# Patient Record
Sex: MALE | Race: BLACK OR AFRICAN AMERICAN | Employment: OTHER | URBAN - METROPOLITAN AREA
[De-identification: names, ages, dates, MRNs, and addresses within clinical notes are randomized per-mention and may not be internally consistent; named-entity substitution may affect disease eponyms.]

---

## 2018-01-23 ENCOUNTER — OFFICE VISIT (OUTPATIENT)
Dept: ORTHOPEDICS CLINIC | Facility: CLINIC | Age: 58
End: 2018-01-23

## 2018-01-23 ENCOUNTER — HOSPITAL ENCOUNTER (OUTPATIENT)
Dept: GENERAL RADIOLOGY | Facility: HOSPITAL | Age: 58
Discharge: HOME | End: 2018-01-23
Attending: ORTHOPAEDIC SURGERY
Payer: MEDICAID

## 2018-01-23 DIAGNOSIS — M22.41 CHONDROMALACIA PATELLAE OF RIGHT KNEE: ICD-10-CM

## 2018-01-23 DIAGNOSIS — S39.012A LUMBAR STRAIN, INITIAL ENCOUNTER: Primary | ICD-10-CM

## 2018-01-23 DIAGNOSIS — M79.604 RIGHT LEG PAIN: ICD-10-CM

## 2018-01-23 DIAGNOSIS — M25.561 RIGHT KNEE PAIN, UNSPECIFIED CHRONICITY: ICD-10-CM

## 2018-01-23 PROCEDURE — 72100 X-RAY EXAM L-S SPINE 2/3 VWS: CPT | Performed by: ORTHOPAEDIC SURGERY

## 2018-01-23 PROCEDURE — 99212 OFFICE O/P EST SF 10 MIN: CPT | Performed by: ORTHOPAEDIC SURGERY

## 2018-01-23 PROCEDURE — 73560 X-RAY EXAM OF KNEE 1 OR 2: CPT | Performed by: ORTHOPAEDIC SURGERY

## 2018-01-23 PROCEDURE — 99203 OFFICE O/P NEW LOW 30 MIN: CPT | Performed by: ORTHOPAEDIC SURGERY

## 2018-01-23 PROCEDURE — 73565 X-RAY EXAM OF KNEES: CPT | Performed by: ORTHOPAEDIC SURGERY

## 2018-01-23 RX ORDER — TEMAZEPAM 30 MG/1
30 CAPSULE ORAL DAILY
Refills: 0 | COMMUNITY
Start: 2017-12-31 | End: 2019-11-26

## 2018-01-23 RX ORDER — ZIDOVUDINE 300 MG/1
300 TABLET ORAL 2 TIMES DAILY
Refills: 6 | COMMUNITY
Start: 2017-12-31

## 2018-01-23 RX ORDER — CLONIDINE HYDROCHLORIDE 0.1 MG/1
0.1 TABLET ORAL DAILY
Refills: 2 | COMMUNITY
Start: 2017-12-31

## 2018-01-23 RX ORDER — LAMIVUDINE 150 MG/1
150 TABLET, FILM COATED ORAL 2 TIMES DAILY
COMMUNITY

## 2018-01-23 RX ORDER — TRAZODONE HYDROCHLORIDE 50 MG/1
50 TABLET ORAL NIGHTLY
Refills: 0 | COMMUNITY
Start: 2017-12-31 | End: 2019-11-07

## 2018-01-23 RX ORDER — ALBUTEROL SULFATE 90 UG/1
AEROSOL, METERED RESPIRATORY (INHALATION) EVERY 6 HOURS PRN
COMMUNITY
End: 2018-08-03

## 2018-01-23 RX ORDER — DARUNAVIR 600 MG
600 TABLET ORAL 2 TIMES DAILY
Refills: 0 | COMMUNITY
Start: 2017-12-31

## 2018-01-23 RX ORDER — ALLOPURINOL 100 MG/1
100 TABLET ORAL DAILY
Refills: 3 | COMMUNITY
Start: 2017-12-31

## 2018-01-23 RX ORDER — SERTRALINE HYDROCHLORIDE 100 MG/1
100 TABLET, FILM COATED ORAL
Refills: 0 | COMMUNITY
Start: 2017-12-31 | End: 2019-12-17

## 2018-01-23 RX ORDER — ERGOCALCIFEROL 1.25 MG/1
CAPSULE ORAL
COMMUNITY
End: 2019-04-04

## 2018-01-23 RX ORDER — RANITIDINE 150 MG/1
150 TABLET ORAL 2 TIMES DAILY
COMMUNITY
End: 2020-07-20

## 2018-01-23 RX ORDER — METOPROLOL TARTRATE 50 MG/1
50 TABLET, FILM COATED ORAL 2 TIMES DAILY
Refills: 2 | COMMUNITY
Start: 2017-12-31 | End: 2018-12-07 | Stop reason: ALTCHOICE

## 2018-01-24 NOTE — PROGRESS NOTES
1/23/2018  Angela Nae  12/10/1960  65year old   male  Mike Del Rosario MD    HPI:   Patient presents with:  Knee Pain: right- pt states pain started 3 months ago after he had a fall. pt went to [place] ER and had XR, but did not bring it with him.  p Take 150 mg by mouth 2 (two) times daily. Disp:  Rfl:    ergocalciferol 93723 units Oral Cap Take by mouth every 7 days. Disp:  Rfl:    RaNITidine HCl 150 MG Oral Tab Take 150 mg by mouth 2 (two) times daily.  Disp:  Rfl:       HISTORY:  Past Medical Histor calf negative Homans sign. The patient has no tenderness palpation about the lumbar spine. Patient has no pain with extension or flexion of the lumbar spine. The patient has a negative straight leg raise bilaterally.   Patient has no pain with extensio

## 2018-02-01 ENCOUNTER — OFFICE VISIT (OUTPATIENT)
Dept: PHYSICAL THERAPY | Facility: HOSPITAL | Age: 58
End: 2018-02-01
Attending: ORTHOPAEDIC SURGERY
Payer: MEDICAID

## 2018-02-01 DIAGNOSIS — S39.012A LUMBAR STRAIN, INITIAL ENCOUNTER: ICD-10-CM

## 2018-02-01 DIAGNOSIS — M22.41 CHONDROMALACIA PATELLAE OF RIGHT KNEE: ICD-10-CM

## 2018-02-01 PROCEDURE — 97162 PT EVAL MOD COMPLEX 30 MIN: CPT | Performed by: PHYSICAL THERAPIST

## 2018-02-01 NOTE — PROGRESS NOTES
LUMBAR SPINE EVALUATION:   Referring Physician: Dr. Fanny Griffin  Diagnosis: Lumbar strain, initial encounter (Q14.216R)  Chondromalacia patellae of right knee (M22.41)    Evaluation Date: 2/1/2018  Visit # 1  Scheduled Visits 8  Insurance Authorized visits rounded shoulders and forward head  Response to OC posture: NE  Response to slouch posture: inc pulling in leg      Strength   Right Left Comments   Hip Flexion (L2)      2+/5 5/5    Knee Extension (L3) 3+/5 5/5    Knee Flexion 3+/5 5/5    Ankle DF (L4)  5 Duration: Patient will be seen for 2x/week or a total of 10 visits over a 90 day period. Treatment will include: Manual Therapy; Therapeutic Exercises; Neuromuscular Re-education; Therapeutic Activity; Ultrasound;  Electrical Stim; Traction; Patient educat

## 2018-02-06 ENCOUNTER — APPOINTMENT (OUTPATIENT)
Dept: PHYSICAL THERAPY | Facility: HOSPITAL | Age: 58
End: 2018-02-06
Attending: ORTHOPAEDIC SURGERY
Payer: MEDICAID

## 2018-02-08 ENCOUNTER — OFFICE VISIT (OUTPATIENT)
Dept: PHYSICAL THERAPY | Facility: HOSPITAL | Age: 58
End: 2018-02-08
Attending: ORTHOPAEDIC SURGERY
Payer: MEDICAID

## 2018-02-08 DIAGNOSIS — S39.012A LUMBAR STRAIN, INITIAL ENCOUNTER: ICD-10-CM

## 2018-02-08 DIAGNOSIS — M22.41 CHONDROMALACIA PATELLAE OF RIGHT KNEE: ICD-10-CM

## 2018-02-08 PROCEDURE — 97110 THERAPEUTIC EXERCISES: CPT

## 2018-02-08 NOTE — PROGRESS NOTES
Diagnosis: Lumbar strain, initial encounter (Z52.886G)  Chondromalacia patellae of right knee (M22.41)     Authorized # of Visits:  2/6         Next MD visit: none scheduled  Fall Risk: standard         Precautions: HIV, COPD           Medication Changes s strengthening, ROM, postural training, HEP training. Pt will purchase st cane. Charges:  TherEx3       Total Timed Treatment: 42 min  Total Treatment Time: 43 min

## 2018-02-13 ENCOUNTER — OFFICE VISIT (OUTPATIENT)
Dept: PHYSICAL THERAPY | Facility: HOSPITAL | Age: 58
End: 2018-02-13
Attending: ORTHOPAEDIC SURGERY
Payer: MEDICAID

## 2018-02-13 DIAGNOSIS — M22.41 CHONDROMALACIA PATELLAE OF RIGHT KNEE: ICD-10-CM

## 2018-02-13 DIAGNOSIS — S39.012A LUMBAR STRAIN, INITIAL ENCOUNTER: ICD-10-CM

## 2018-02-13 PROCEDURE — 97110 THERAPEUTIC EXERCISES: CPT

## 2018-02-13 NOTE — PROGRESS NOTES
Diagnosis: Lumbar strain, initial encounter (S11.367Y)  Chondromalacia patellae of right knee (M22.41)     Authorized # of Visits:  3/6         Next MD visit: none scheduled  Fall Risk: standard         Precautions: HIV, COPD           Medication Changes s laundry basket  4. Pt to report ability to sit for 2 hours with pain reported less than 2/10 in order to drive  in car  5.  Pt to exhibit increase in lumbar AROM to min loss in all planes for ease of mobility/transfers in order for patient to get into/out o

## 2018-02-15 ENCOUNTER — APPOINTMENT (OUTPATIENT)
Dept: PHYSICAL THERAPY | Facility: HOSPITAL | Age: 58
End: 2018-02-15
Attending: ORTHOPAEDIC SURGERY
Payer: MEDICAID

## 2018-02-20 ENCOUNTER — OFFICE VISIT (OUTPATIENT)
Dept: PHYSICAL THERAPY | Facility: HOSPITAL | Age: 58
End: 2018-02-20
Attending: ORTHOPAEDIC SURGERY
Payer: MEDICAID

## 2018-02-20 DIAGNOSIS — S39.012A LUMBAR STRAIN, INITIAL ENCOUNTER: ICD-10-CM

## 2018-02-20 DIAGNOSIS — M22.41 CHONDROMALACIA PATELLAE OF RIGHT KNEE: ICD-10-CM

## 2018-02-20 PROCEDURE — 97110 THERAPEUTIC EXERCISES: CPT

## 2018-02-20 NOTE — PROGRESS NOTES
Diagnosis: Lumbar strain, initial encounter (X81.478G)  Chondromalacia patellae of right knee (M22.41)     Authorized # of Visits:  4/6         Next MD visit: none scheduled  Fall Risk: standard         Precautions: HIV, COPD           Medication Changes laundry basket  4. Pt to report ability to sit for 2 hours with pain reported less than 2/10 in order to drive  in car  5.  Pt to exhibit increase in lumbar AROM to min loss in all planes for ease of mobility/transfers in order for patient to get into/out o

## 2018-02-22 ENCOUNTER — APPOINTMENT (OUTPATIENT)
Dept: PHYSICAL THERAPY | Facility: HOSPITAL | Age: 58
End: 2018-02-22
Attending: ORTHOPAEDIC SURGERY
Payer: MEDICAID

## 2018-02-27 ENCOUNTER — APPOINTMENT (OUTPATIENT)
Dept: PHYSICAL THERAPY | Facility: HOSPITAL | Age: 58
End: 2018-02-27
Attending: ORTHOPAEDIC SURGERY
Payer: MEDICAID

## 2018-03-23 NOTE — PROGRESS NOTES
Patient Name: Lance Mckenna, : 12/10/1960, MRN: [id number]   Date:  3/23/2018  Referring Physician:  Srinivas Garcia    Diagnosis:     ICD-10-CM    1. Lumbar strain, initial encounter [phone]    2.  Chondromalacia patellae of right knee M22.41

## 2018-06-06 ENCOUNTER — OFFICE VISIT (OUTPATIENT)
Dept: INTERNAL MEDICINE CLINIC | Facility: CLINIC | Age: 58
End: 2018-06-06

## 2018-06-06 VITALS
DIASTOLIC BLOOD PRESSURE: 80 MMHG | WEIGHT: 172 LBS | SYSTOLIC BLOOD PRESSURE: 120 MMHG | BODY MASS INDEX: 26.07 KG/M2 | HEART RATE: 76 BPM | TEMPERATURE: 98 F | OXYGEN SATURATION: 97 % | HEIGHT: 68 IN

## 2018-06-06 DIAGNOSIS — E78.2 MIXED HYPERLIPIDEMIA: ICD-10-CM

## 2018-06-06 DIAGNOSIS — Z12.5 PROSTATE CANCER SCREENING: ICD-10-CM

## 2018-06-06 DIAGNOSIS — R53.83 FATIGUE, UNSPECIFIED TYPE: ICD-10-CM

## 2018-06-06 DIAGNOSIS — I35.9 AORTIC VALVE DISORDER: ICD-10-CM

## 2018-06-06 DIAGNOSIS — B20 HUMAN IMMUNODEFICIENCY VIRUS (HIV) DISEASE (HCC): ICD-10-CM

## 2018-06-06 DIAGNOSIS — N18.30 STAGE 3 CHRONIC KIDNEY DISEASE (HCC): ICD-10-CM

## 2018-06-06 DIAGNOSIS — M25.562 CHRONIC PAIN OF LEFT KNEE: ICD-10-CM

## 2018-06-06 DIAGNOSIS — G89.29 CHRONIC PAIN OF LEFT KNEE: ICD-10-CM

## 2018-06-06 DIAGNOSIS — J43.9 PULMONARY EMPHYSEMA, UNSPECIFIED EMPHYSEMA TYPE (HCC): ICD-10-CM

## 2018-06-06 DIAGNOSIS — I35.1 AORTIC VALVE INSUFFICIENCY, ETIOLOGY OF CARDIAC VALVE DISEASE UNSPECIFIED: ICD-10-CM

## 2018-06-06 DIAGNOSIS — E78.00 HYPERCHOLESTEROLEMIA: ICD-10-CM

## 2018-06-06 DIAGNOSIS — I10 ESSENTIAL HYPERTENSION: ICD-10-CM

## 2018-06-06 DIAGNOSIS — Z00.00 ANNUAL PHYSICAL EXAM: Primary | ICD-10-CM

## 2018-06-06 PROCEDURE — 99212 OFFICE O/P EST SF 10 MIN: CPT | Performed by: INTERNAL MEDICINE

## 2018-06-06 PROCEDURE — 99386 PREV VISIT NEW AGE 40-64: CPT | Performed by: INTERNAL MEDICINE

## 2018-06-06 PROCEDURE — 99204 OFFICE O/P NEW MOD 45 MIN: CPT | Performed by: INTERNAL MEDICINE

## 2018-06-06 NOTE — PATIENT INSTRUCTIONS
1.  Patient is to continue his current diet, medication and activity. 2.  I will plan to see the patient back in 1 or 2 months with blood tests, urinalysis and EKG. the blood tests will include a CBC, CMP, lipid panel, TSH, PSA and urinalysis.   3.  I will

## 2018-06-07 NOTE — PROGRESS NOTES
Sky Bob is a 65year old male who presents for a complete physical exam.   HPI:   Mr. Cassondra Shone is a 26-year-old black male who was seen by me on June 6, 2018 for his initial annual physical examination.   Patient has chosen me to be his primary care COPD.  He does not drink alcohol. Family history. Patient's mother  of lung cancer. She also history of ASHD. Patient's father  of lung cancer. Patient has a grandmother who  of stomach cancer. Review of systems.   Patient feels well in ge [facility])    • COPD (chronic obstructive pulmonary disease) ([facility] [address]    • HIV disease ([facility] [address]       No past surgical history on file.    Family History   Problem Relation Age of Onset   • Cancer Father      lung   • Cancer Mother      lung   • Heart Attack Mother legs  EXTREMITIES:No edema. All peripheral pulses are intact. NEURO:Alert and oriented, CN are intact, DTRs are 1+ Bilaterally. ASSESSMENT AND PLAN:   1. Annual physical exam  Patient appears stable at this time.   He is to continue his current diet, m type [facility])  Patient feels he has pulmonary emphysema related to his use of smoking in the past.  He feels this is especially bad in the hot weather. 8. Chronic pain of left knee  Patient has pain in his left knee which is chronic.   Up until now he has b

## 2018-07-29 ENCOUNTER — LAB ENCOUNTER (OUTPATIENT)
Dept: LAB | Facility: HOSPITAL | Age: 58
End: 2018-07-29
Attending: INTERNAL MEDICINE
Payer: MEDICAID

## 2018-07-29 DIAGNOSIS — Z00.00 ANNUAL PHYSICAL EXAM: ICD-10-CM

## 2018-07-29 DIAGNOSIS — E78.00 HYPERCHOLESTEROLEMIA: ICD-10-CM

## 2018-07-29 DIAGNOSIS — N18.30 STAGE 3 CHRONIC KIDNEY DISEASE (HCC): ICD-10-CM

## 2018-07-29 DIAGNOSIS — R53.83 FATIGUE, UNSPECIFIED TYPE: ICD-10-CM

## 2018-07-29 DIAGNOSIS — Z12.5 PROSTATE CANCER SCREENING: ICD-10-CM

## 2018-07-29 LAB
ALBUMIN SERPL BCP-MCNC: 3.5 G/DL (ref 3.5–4.8)
ALBUMIN/GLOB SERPL: 0.9 {RATIO} (ref 1–2)
ALP SERPL-CCNC: 62 U/L (ref 32–100)
ALT SERPL-CCNC: 16 U/L (ref 17–63)
ANION GAP SERPL CALC-SCNC: 9 MMOL/L (ref 0–18)
AST SERPL-CCNC: 26 U/L (ref 15–41)
BACTERIA UR QL AUTO: NEGATIVE /HPF
BILIRUB SERPL-MCNC: 0.6 MG/DL (ref 0.3–1.2)
BILIRUB UR QL: NEGATIVE
BUN SERPL-MCNC: 41 MG/DL (ref 8–20)
BUN/CREAT SERPL: 16.9 (ref 10–20)
CALCIUM SERPL-MCNC: 9.9 MG/DL (ref 8.5–10.5)
CHLORIDE SERPL-SCNC: 107 MMOL/L (ref 95–110)
CHOLEST SERPL-MCNC: 192 MG/DL (ref 110–200)
CLARITY UR: CLEAR
CO2 SERPL-SCNC: 23 MMOL/L (ref 22–32)
COLOR UR: YELLOW
CREAT SERPL-MCNC: 2.43 MG/DL (ref 0.5–1.5)
GLOBULIN PLAS-MCNC: 3.8 G/DL (ref 2.5–3.7)
GLUCOSE SERPL-MCNC: 103 MG/DL (ref 70–99)
GLUCOSE UR-MCNC: NEGATIVE MG/DL
HDLC SERPL-MCNC: 25 MG/DL
KETONES UR-MCNC: NEGATIVE MG/DL
LDLC SERPL DIRECT ASSAY-MCNC: 38 MG/DL (ref 0–99)
LEUKOCYTE ESTERASE UR QL STRIP.AUTO: NEGATIVE
NITRITE UR QL STRIP.AUTO: NEGATIVE
NONHDLC SERPL-MCNC: 167 MG/DL
OSMOLALITY UR CALC.SUM OF ELEC: 298 MOSM/KG (ref 275–295)
PATIENT FASTING: YES
PH UR: 5 [PH] (ref 5–8)
POTASSIUM SERPL-SCNC: 4 MMOL/L (ref 3.3–5.1)
PROT SERPL-MCNC: 7.3 G/DL (ref 5.9–8.4)
PROT UR-MCNC: 100 MG/DL
PSA SERPL-MCNC: 1.1 NG/ML (ref 0–4)
RBC #/AREA URNS AUTO: 1 /HPF
SODIUM SERPL-SCNC: 139 MMOL/L (ref 136–144)
SP GR UR STRIP: 1.02 (ref 1–1.03)
TRIGL SERPL-MCNC: 774 MG/DL (ref 1–149)
TSH SERPL-ACNC: 1.16 UIU/ML (ref 0.45–5.33)
UROBILINOGEN UR STRIP-ACNC: <2
VIT C UR-MCNC: NEGATIVE MG/DL
WBC #/AREA URNS AUTO: 1 /HPF

## 2018-07-29 PROCEDURE — 80053 COMPREHEN METABOLIC PANEL: CPT

## 2018-07-29 PROCEDURE — 81001 URINALYSIS AUTO W/SCOPE: CPT

## 2018-07-29 PROCEDURE — 85025 COMPLETE CBC W/AUTO DIFF WBC: CPT

## 2018-07-29 PROCEDURE — 36415 COLL VENOUS BLD VENIPUNCTURE: CPT

## 2018-07-29 PROCEDURE — 80061 LIPID PANEL: CPT

## 2018-07-29 PROCEDURE — 84443 ASSAY THYROID STIM HORMONE: CPT

## 2018-07-29 PROCEDURE — 85060 BLOOD SMEAR INTERPRETATION: CPT

## 2018-07-29 PROCEDURE — 83721 ASSAY OF BLOOD LIPOPROTEIN: CPT

## 2018-07-30 LAB
BASOPHILS # BLD: 0 K/UL (ref 0–0.2)
BASOPHILS NFR BLD: 1 %
EOSINOPHIL # BLD: 0.2 K/UL (ref 0–0.7)
EOSINOPHIL NFR BLD: 3 %
ERYTHROCYTE [DISTWIDTH] IN BLOOD BY AUTOMATED COUNT: 14.1 % (ref 11–15)
HCT VFR BLD AUTO: 38.6 % (ref 41–52)
HGB BLD-MCNC: 13.1 G/DL (ref 13.5–17.5)
LYMPHOCYTES # BLD: 2.1 K/UL (ref 1–4)
LYMPHOCYTES NFR BLD: 32 %
MCH RBC QN AUTO: 38.3 PG (ref 27–32)
MCHC RBC AUTO-ENTMCNC: 33.9 G/DL (ref 32–37)
MCV RBC AUTO: 113.2 FL (ref 80–100)
MONOCYTES # BLD: 0.5 K/UL (ref 0–1)
MONOCYTES NFR BLD: 8 %
NEUTROPHILS # BLD AUTO: 3.8 K/UL (ref 1.8–7.7)
NEUTROPHILS NFR BLD: 57 %
PLATELET # BLD AUTO: 222 K/UL (ref 140–400)
PMV BLD AUTO: 8.9 FL (ref 7.4–10.3)
RBC # BLD AUTO: 3.41 M/UL (ref 4.5–5.9)
WBC # BLD AUTO: 6.7 K/UL (ref 4–11)

## 2018-08-03 ENCOUNTER — OFFICE VISIT (OUTPATIENT)
Dept: INTERNAL MEDICINE CLINIC | Facility: CLINIC | Age: 58
End: 2018-08-03
Payer: MEDICAID

## 2018-08-03 VITALS
DIASTOLIC BLOOD PRESSURE: 76 MMHG | TEMPERATURE: 99 F | OXYGEN SATURATION: 97 % | SYSTOLIC BLOOD PRESSURE: 126 MMHG | BODY MASS INDEX: 26.58 KG/M2 | WEIGHT: 175.38 LBS | HEART RATE: 68 BPM | HEIGHT: 68 IN

## 2018-08-03 DIAGNOSIS — D63.1 ANEMIA IN STAGE 3 CHRONIC KIDNEY DISEASE (HCC): ICD-10-CM

## 2018-08-03 DIAGNOSIS — R94.31 ABNORMAL EKG: ICD-10-CM

## 2018-08-03 DIAGNOSIS — E78.1 HYPERTRIGLYCERIDEMIA: ICD-10-CM

## 2018-08-03 DIAGNOSIS — I35.1 AORTIC VALVE INSUFFICIENCY, ETIOLOGY OF CARDIAC VALVE DISEASE UNSPECIFIED: ICD-10-CM

## 2018-08-03 DIAGNOSIS — G89.29 CHRONIC PAIN OF LEFT KNEE: ICD-10-CM

## 2018-08-03 DIAGNOSIS — M25.562 CHRONIC PAIN OF LEFT KNEE: ICD-10-CM

## 2018-08-03 DIAGNOSIS — B20 HUMAN IMMUNODEFICIENCY VIRUS (HIV) DISEASE (HCC): ICD-10-CM

## 2018-08-03 DIAGNOSIS — N18.30 STAGE 3 CHRONIC KIDNEY DISEASE (HCC): ICD-10-CM

## 2018-08-03 DIAGNOSIS — I10 ESSENTIAL HYPERTENSION: ICD-10-CM

## 2018-08-03 DIAGNOSIS — J43.9 PULMONARY EMPHYSEMA, UNSPECIFIED EMPHYSEMA TYPE (HCC): ICD-10-CM

## 2018-08-03 DIAGNOSIS — Z00.00 ANNUAL PHYSICAL EXAM: ICD-10-CM

## 2018-08-03 DIAGNOSIS — N18.30 ANEMIA IN STAGE 3 CHRONIC KIDNEY DISEASE (HCC): ICD-10-CM

## 2018-08-03 DIAGNOSIS — E78.00 HYPERCHOLESTEROLEMIA: Primary | ICD-10-CM

## 2018-08-03 PROCEDURE — 93000 ELECTROCARDIOGRAM COMPLETE: CPT | Performed by: INTERNAL MEDICINE

## 2018-08-03 PROCEDURE — 99214 OFFICE O/P EST MOD 30 MIN: CPT | Performed by: INTERNAL MEDICINE

## 2018-08-03 PROCEDURE — 99212 OFFICE O/P EST SF 10 MIN: CPT | Performed by: INTERNAL MEDICINE

## 2018-08-03 PROCEDURE — 93005 ELECTROCARDIOGRAM TRACING: CPT | Performed by: INTERNAL MEDICINE

## 2018-08-03 RX ORDER — ALBUTEROL SULFATE 90 UG/1
1 AEROSOL, METERED RESPIRATORY (INHALATION) EVERY 6 HOURS PRN
Refills: 6 | COMMUNITY
Start: 2018-08-03 | End: 2018-12-04

## 2018-08-03 NOTE — PROGRESS NOTES
Franca Hughes is a 65year old male. Patient presents with: Follow - Up: Patient is here for f/u to go over labs. Patient has questions regarding arthritis pain. Hyperlipidemia  Renal Insufficiency  Hypertension    HPI:   Patient presents with:   Follow - U Past Medical History:   Diagnosis Date   • Chronic kidney disease, stage 3 (HCC)    • COPD (chronic obstructive pulmonary disease) (HCC)    • HIV disease ([facility] [address]       Social History:  Smoking status: Former Smoker sweets and alcohol. I will see the patient back in in 3 months with blood tests which will include a CBC, BMP, and lipid panel. I will see the patient back sooner as necessary. 3. Stage 3 chronic kidney disease (HCC)  Stable.   CPM.  Patient's recent C Insufficiency  Hypertension    HPI:   Patient presents with: Follow - Up: Patient is here for f/u to go over labs. Patient has questions regarding arthritis pain. Hyperlipidemia  Renal Insufficiency  Hypertension    As above.     Current Outpatient Prescr SOB  CARDIOVASCULAR: No chest pain  GI: No abdominal pain, nausea, vomiting, diarrhea, or constipation  :No Urinary complaints  EXT:No complaints of pain or swelling in patient's legs    EXAM:   /76 (BP Location: Left arm, Patient Position: Sitting

## 2018-08-03 NOTE — PATIENT INSTRUCTIONS
1.  Patient is to continue his current diet, medication and activity. 2.  I will see the patient back in 3 months with blood tests which will include a CBC, BMP, and lipid panel. 3.  I will see the patient back sooner as necessary.   4.  Patient is to fol

## 2018-08-10 ENCOUNTER — OFFICE VISIT (OUTPATIENT)
Dept: ORTHOPEDICS CLINIC | Facility: CLINIC | Age: 58
End: 2018-08-10
Payer: MEDICAID

## 2018-08-10 ENCOUNTER — HOSPITAL ENCOUNTER (OUTPATIENT)
Dept: GENERAL RADIOLOGY | Facility: HOSPITAL | Age: 58
Discharge: HOME | End: 2018-08-10
Attending: ORTHOPAEDIC SURGERY
Payer: MEDICAID

## 2018-08-10 DIAGNOSIS — S39.012A LUMBAR STRAIN, INITIAL ENCOUNTER: ICD-10-CM

## 2018-08-10 DIAGNOSIS — R52 PAIN: ICD-10-CM

## 2018-08-10 DIAGNOSIS — M16.11 PRIMARY OSTEOARTHRITIS OF RIGHT HIP: Primary | ICD-10-CM

## 2018-08-10 PROCEDURE — 99212 OFFICE O/P EST SF 10 MIN: CPT | Performed by: ORTHOPAEDIC SURGERY

## 2018-08-10 PROCEDURE — 73502 X-RAY EXAM HIP UNI 2-3 VIEWS: CPT | Performed by: ORTHOPAEDIC SURGERY

## 2018-08-10 PROCEDURE — 99214 OFFICE O/P EST MOD 30 MIN: CPT | Performed by: ORTHOPAEDIC SURGERY

## 2018-08-10 NOTE — PROGRESS NOTES
8/10/2018  Markell Sepulveda  12/10/1960  65year old   male  Roxanne Parry MD    HPI:   Patient presents with:  Knee Pain: Pt is here for a follow up on his right knee and spine problem. Follow up with this.  Arthritis with the knee and finding that he is TESTING:  Plain films of the right hip were obtained today and reveals femoral acetabular joint space narrowing.         ASSESSMENT AND PLAN:   Primary osteoarthritis of right hip  (primary encounter diagnosis)  Lumbar strain, initial encounter  Pain    Thi

## 2018-08-20 ENCOUNTER — OFFICE VISIT (OUTPATIENT)
Dept: PHYSICAL THERAPY | Facility: HOSPITAL | Age: 58
End: 2018-08-20
Attending: ORTHOPAEDIC SURGERY
Payer: MEDICAID

## 2018-08-20 DIAGNOSIS — M16.11 PRIMARY OSTEOARTHRITIS OF RIGHT HIP: ICD-10-CM

## 2018-08-20 DIAGNOSIS — S39.012A LUMBAR STRAIN, INITIAL ENCOUNTER: ICD-10-CM

## 2018-08-20 PROCEDURE — 97162 PT EVAL MOD COMPLEX 30 MIN: CPT

## 2018-08-20 PROCEDURE — 97110 THERAPEUTIC EXERCISES: CPT

## 2018-08-20 NOTE — PROGRESS NOTES
LUMBAR SPINE EVALUATION:   Referring Physician: Dr. Milagros Birmingham DX:  Primary osteoarthritis of right hip (M16.11)  Lumbar strain, initial encounter (P68.008F)     Date of Service: 8/20/2018       PATIENT Hawa Ugalde is a 65year old y [facility])    • HIV disease ([facility] [address]     . ASSESSMENT:   Luigi Wharton is a 65year old year old male who presents to physical therapy with low back and right hip pain.  Patients impairments include decreased lumbar range of motion, decreased lower extremity strengt hip ABD strength to 5/5 to increase ease with standing and walking   · Pt will improve functional hip strength to report ability to ascend/descend 1 flight of stairs reciprocally without use of handrail  · Pt will be independent and compliant with comprehe

## 2018-08-24 ENCOUNTER — OFFICE VISIT (OUTPATIENT)
Dept: PHYSICAL THERAPY | Facility: HOSPITAL | Age: 58
End: 2018-08-24
Attending: ORTHOPAEDIC SURGERY
Payer: MEDICAID

## 2018-08-24 DIAGNOSIS — M16.11 PRIMARY OSTEOARTHRITIS OF RIGHT HIP: ICD-10-CM

## 2018-08-24 DIAGNOSIS — S39.012A LUMBAR STRAIN, INITIAL ENCOUNTER: ICD-10-CM

## 2018-08-24 PROCEDURE — 97110 THERAPEUTIC EXERCISES: CPT

## 2018-08-24 NOTE — PROGRESS NOTES
Associated DX:  Primary osteoarthritis of right hip (M16.11)  Lumbar strain, initial encounter (N87.019D)    Authorized # of Visits:  8        Insurance:[facility] MD visit: not scheduled  Fall Risk: standard         Precautions: HIV,

## 2018-08-27 ENCOUNTER — OFFICE VISIT (OUTPATIENT)
Dept: PHYSICAL THERAPY | Facility: HOSPITAL | Age: 58
End: 2018-08-27
Attending: ORTHOPAEDIC SURGERY
Payer: MEDICAID

## 2018-08-27 NOTE — PROGRESS NOTES
Patient arrived on time for PT treatment however patient continued c/o my COPD very bad today I have hard time breathing and try Nustep x 6 min and its hard for breathing so patient cancelled today's appointment. Confirmed next appointment.

## 2018-08-30 ENCOUNTER — OFFICE VISIT (OUTPATIENT)
Dept: PHYSICAL THERAPY | Facility: HOSPITAL | Age: 58
End: 2018-08-30
Attending: ORTHOPAEDIC SURGERY
Payer: MEDICAID

## 2018-08-30 DIAGNOSIS — M16.11 PRIMARY OSTEOARTHRITIS OF RIGHT HIP: ICD-10-CM

## 2018-08-30 DIAGNOSIS — S39.012A LUMBAR STRAIN, INITIAL ENCOUNTER: ICD-10-CM

## 2018-08-30 PROCEDURE — 97110 THERAPEUTIC EXERCISES: CPT

## 2018-08-30 NOTE — PROGRESS NOTES
Associated DX:  Primary osteoarthritis of right hip (M16.11)  Lumbar strain, initial encounter (K19.406T)     Authorized # of Visits:  8        Insurance:[facility] MD visit: not scheduled  Fall Risk: standard         Precautions: HIV, CO

## 2018-09-05 ENCOUNTER — TELEPHONE (OUTPATIENT)
Dept: PHYSICAL THERAPY | Facility: HOSPITAL | Age: 58
End: 2018-09-05

## 2018-09-05 ENCOUNTER — APPOINTMENT (OUTPATIENT)
Dept: PHYSICAL THERAPY | Facility: HOSPITAL | Age: 58
End: 2018-09-05
Attending: ORTHOPAEDIC SURGERY
Payer: MEDICAID

## 2018-09-07 ENCOUNTER — OFFICE VISIT (OUTPATIENT)
Dept: PHYSICAL THERAPY | Facility: HOSPITAL | Age: 58
End: 2018-09-07
Attending: ORTHOPAEDIC SURGERY
Payer: MEDICAID

## 2018-09-07 DIAGNOSIS — S39.012A LUMBAR STRAIN, INITIAL ENCOUNTER: ICD-10-CM

## 2018-09-07 DIAGNOSIS — M16.11 PRIMARY OSTEOARTHRITIS OF RIGHT HIP: ICD-10-CM

## 2018-09-07 PROCEDURE — 97110 THERAPEUTIC EXERCISES: CPT

## 2018-09-07 NOTE — PROGRESS NOTES
Associated DX:  Primary osteoarthritis of right hip (M16.11)  Lumbar strain, initial encounter (S11.946V)     Authorized # of Visits:  8        Insurance:[facility] MD visit: not scheduled  Fall Risk: standard         Precautions: HIV, CO strength ( see chart). Progressed hip strengthening with supine and weightbearing exercises. Plan: Continue with extension based exercises and left hip strengthening exercises.  Added additional sessions (expires on 9/19/18)    Charges: 3TE       Total

## 2018-09-11 ENCOUNTER — APPOINTMENT (OUTPATIENT)
Dept: PHYSICAL THERAPY | Facility: HOSPITAL | Age: 58
End: 2018-09-11
Attending: ORTHOPAEDIC SURGERY
Payer: MEDICAID

## 2018-09-14 ENCOUNTER — OFFICE VISIT (OUTPATIENT)
Dept: PHYSICAL THERAPY | Facility: HOSPITAL | Age: 58
End: 2018-09-14
Attending: ORTHOPAEDIC SURGERY
Payer: MEDICAID

## 2018-09-14 DIAGNOSIS — S39.012A LUMBAR STRAIN, INITIAL ENCOUNTER: ICD-10-CM

## 2018-09-14 DIAGNOSIS — M16.11 PRIMARY OSTEOARTHRITIS OF RIGHT HIP: ICD-10-CM

## 2018-09-14 PROCEDURE — 97110 THERAPEUTIC EXERCISES: CPT

## 2018-09-14 NOTE — PROGRESS NOTES
Associated DX:  Primary osteoarthritis of right hip (M16.11)  Lumbar strain, initial encounter (T82.432O)     Authorized # of Visits:  8        Insurance:[facility] MD visit: not scheduled  Fall Risk: standard         Precautions: HIV, CO curl, S/L clams, hip abd, bridging, LAQ      Assessment: Patient limited because of breathing today. Patient needed multi rest breaks. Patient oxygen level 94-96% throughout session.       Plan: Continue with extension based exercises and left hip strengthe

## 2018-09-19 ENCOUNTER — OFFICE VISIT (OUTPATIENT)
Dept: PHYSICAL THERAPY | Facility: HOSPITAL | Age: 58
End: 2018-09-19
Attending: ORTHOPAEDIC SURGERY
Payer: MEDICAID

## 2018-09-19 PROCEDURE — 97110 THERAPEUTIC EXERCISES: CPT

## 2018-09-19 NOTE — PROGRESS NOTES
Associated DX:  Primary osteoarthritis of right hip (M16.11)  Lumbar strain, initial encounter (I84.230L)     Authorized # of Visits:  8        Insurance:[facility] MD visit: not scheduled  Fall Risk: standard         Precautions: HIV, CO raises x 15  ODILON calf stretch L3, 3 x 30 sec hold  Standing marching  x15  Standing hip abd/ext x 15 B  Standing HS stretch 4 x 20 sec hold R  Prone on elbow x 2 min  PPU 1 x 10  PPU with SAG x10  Prone GS 15 x 5 sec hold  Prone hip ext x 15 B   Prone al

## 2018-09-28 ENCOUNTER — APPOINTMENT (OUTPATIENT)
Dept: PHYSICAL THERAPY | Facility: HOSPITAL | Age: 58
End: 2018-09-28
Attending: ORTHOPAEDIC SURGERY
Payer: MEDICAID

## 2018-09-28 ENCOUNTER — TELEPHONE (OUTPATIENT)
Dept: PHYSICAL THERAPY | Facility: HOSPITAL | Age: 58
End: 2018-09-28

## 2018-10-01 ENCOUNTER — OFFICE VISIT (OUTPATIENT)
Dept: PHYSICAL THERAPY | Facility: HOSPITAL | Age: 58
End: 2018-10-01
Attending: ORTHOPAEDIC SURGERY
Payer: MEDICAID

## 2018-10-01 PROCEDURE — 97110 THERAPEUTIC EXERCISES: CPT

## 2018-10-01 NOTE — PROGRESS NOTES
Associated DX:  Primary osteoarthritis of right hip (M16.11)  Lumbar strain, initial encounter (K12.473T)     Authorized # of Visits:  8        [facility] MD visit: not scheduled  Fall Risk: standard         Precautions: HIV, CO x 30 sec hold  Standing marching  x15  Standing hip abd/ext x 15 B  Standing HS stretch 4 x 20 sec hold R  Prone on elbow x 2 min  PPU 1 x 10  PPU with SAG x10  Prone GS 15 x 5 sec hold  Prone hip ext x 15 B   Prone alt knee flexion      -- Nu Step L6 x 6

## 2018-10-08 ENCOUNTER — OFFICE VISIT (OUTPATIENT)
Dept: PHYSICAL THERAPY | Facility: HOSPITAL | Age: 58
End: 2018-10-08
Attending: ORTHOPAEDIC SURGERY
Payer: MEDICAID

## 2018-10-08 PROCEDURE — 97110 THERAPEUTIC EXERCISES: CPT

## 2018-10-08 NOTE — PROGRESS NOTES
Associated DX:  Primary osteoarthritis of right hip (M16.11)  Lumbar strain, initial encounter (G31.376P)     Authorized # of Visits:  8        Insurance:[facility] MD visit: not scheduled  Fall Risk: standard         Precautions: HIV, CO HS stretch 4 x 20 sec hold R  Prone on elbow x 2 min  PPU 1 x 10  PPU with SAG x10  Prone GS 15 x 5 sec hold  Prone hip ext x 15 B   Prone alt knee flexion      -- Nu Step L6 x 6 min  Standing heel raises x 15  ODILON calf stretch L3, 3 x 30 sec hold  BOSU

## 2018-10-11 ENCOUNTER — LAB ENCOUNTER (OUTPATIENT)
Dept: LAB | Age: 58
End: 2018-10-11
Attending: INTERNAL MEDICINE
Payer: MEDICAID

## 2018-10-11 ENCOUNTER — OFFICE VISIT (OUTPATIENT)
Dept: PHYSICAL THERAPY | Age: 58
End: 2018-10-11
Attending: ORTHOPAEDIC SURGERY
Payer: MEDICAID

## 2018-10-11 DIAGNOSIS — N18.30 STAGE 3 CHRONIC KIDNEY DISEASE (HCC): ICD-10-CM

## 2018-10-11 DIAGNOSIS — E78.00 HYPERCHOLESTEROLEMIA: ICD-10-CM

## 2018-10-11 DIAGNOSIS — D63.1 ANEMIA IN STAGE 3 CHRONIC KIDNEY DISEASE (HCC): ICD-10-CM

## 2018-10-11 DIAGNOSIS — N18.30 ANEMIA IN STAGE 3 CHRONIC KIDNEY DISEASE (HCC): ICD-10-CM

## 2018-10-11 DIAGNOSIS — E78.1 HYPERTRIGLYCERIDEMIA: ICD-10-CM

## 2018-10-11 PROCEDURE — 36415 COLL VENOUS BLD VENIPUNCTURE: CPT

## 2018-10-11 PROCEDURE — 85025 COMPLETE CBC W/AUTO DIFF WBC: CPT

## 2018-10-11 PROCEDURE — 80048 BASIC METABOLIC PNL TOTAL CA: CPT

## 2018-10-11 PROCEDURE — 97110 THERAPEUTIC EXERCISES: CPT

## 2018-10-11 PROCEDURE — 83721 ASSAY OF BLOOD LIPOPROTEIN: CPT

## 2018-10-11 PROCEDURE — 80061 LIPID PANEL: CPT

## 2018-10-11 NOTE — PROGRESS NOTES
DISCHARGE SUMMARY    Associated DX:  Primary osteoarthritis of right hip (M16.11)  Lumbar strain, initial encounter (O09.470L)     Authorized # of Visits:  8        Insurance:[facility] MD visit: not scheduled  Fall Risk: standard x 10 seconds - press ups x 2 x 10    - press up with sag 2 x 10    - Prone hip extension x 20    - Prone knee flexion x 30 B    -Sidelying hip abduction R/L x 30    -Sidelying clams R/L x 30    -Ball squeeze 10 seconds x 20    -Fitness [place] abduction 10 to greater than 30 minutes to return to community ambulation.  MET  · Pt will improve hip ABD strength to 5/5 to increase ease with standing and walking  MET  · Pt will improve functional hip strength to report ability to ascend/descend 1 flight of stairs r

## 2018-12-04 NOTE — TELEPHONE ENCOUNTER
Pt. Called stating he is out of these meds and would really like them right away. Please send to [facility] in [employer]. Pt. Can be reached at [phone] and would like a call when it's been filled.

## 2018-12-05 RX ORDER — ALBUTEROL SULFATE 90 UG/1
1 AEROSOL, METERED RESPIRATORY (INHALATION) EVERY 6 HOURS PRN
Qty: 1 INHALER | Refills: 3 | Status: SHIPPED | OUTPATIENT
Start: 2018-12-05 | End: 2020-02-24

## 2018-12-05 NOTE — TELEPHONE ENCOUNTER
These medications have not been refilled by [facility] before (new patient 6/6/18).  To  to please review and advise on

## 2018-12-07 ENCOUNTER — OFFICE VISIT (OUTPATIENT)
Dept: PODIATRY CLINIC | Facility: CLINIC | Age: 58
End: 2018-12-07
Payer: MEDICAID

## 2018-12-07 DIAGNOSIS — L60.0 INGROWN TOENAIL OF LEFT FOOT: Primary | ICD-10-CM

## 2018-12-07 DIAGNOSIS — M72.2 PLANTAR FASCIITIS OF LEFT FOOT: ICD-10-CM

## 2018-12-07 PROCEDURE — L3060 FOOT ARCH SUPP LONGITUD/META: HCPCS | Performed by: PODIATRIST

## 2018-12-07 PROCEDURE — 99203 OFFICE O/P NEW LOW 30 MIN: CPT | Performed by: PODIATRIST

## 2018-12-07 PROCEDURE — 99212 OFFICE O/P EST SF 10 MIN: CPT | Performed by: PODIATRIST

## 2018-12-07 NOTE — PROGRESS NOTES
HPI:    Patient ID: Cristian Cordova is a 65year old male. This pleasant 81-year-old male presents as a new patient to me and states that he is self-referred. This patient has 2 concerns particularly the tip of the left great toe and the left arch.   He stat Cap Take by mouth every 7 days. Disp:  Rfl:    RaNITidine HCl 150 MG Oral Tab Take 150 mg by mouth 2 (two) times daily. Disp:  Rfl:      Allergies:No Known Allergies   PHYSICAL EXAM:   I am not certain of the cause of this distal left great toe.   The nail

## 2019-03-10 ENCOUNTER — LAB ENCOUNTER (OUTPATIENT)
Dept: LAB | Facility: HOSPITAL | Age: 59
End: 2019-03-10
Attending: INTERNAL MEDICINE
Payer: MEDICAID

## 2019-03-10 DIAGNOSIS — D63.1 ANEMIA SECONDARY TO RENAL FAILURE: ICD-10-CM

## 2019-03-10 DIAGNOSIS — N18.4 CHRONIC KIDNEY DISEASE (CKD), STAGE IV (SEVERE) (HCC): ICD-10-CM

## 2019-03-10 DIAGNOSIS — E55.9 VITAMIN D DEFICIENCY: Primary | ICD-10-CM

## 2019-03-10 DIAGNOSIS — N18.4 BENIGN HYPERTENSION WITH CHRONIC KIDNEY DISEASE, STAGE IV (HCC): ICD-10-CM

## 2019-03-10 DIAGNOSIS — I12.9 BENIGN HYPERTENSION WITH CHRONIC KIDNEY DISEASE, STAGE IV (HCC): ICD-10-CM

## 2019-03-10 DIAGNOSIS — N06.2 ISOLATED PROTEINURIA WITH DIFFUSE MEMBRANOUS GLOMERULONEPHRITIS: ICD-10-CM

## 2019-03-10 DIAGNOSIS — N18.9 ANEMIA SECONDARY TO RENAL FAILURE: ICD-10-CM

## 2019-03-10 LAB
ALBUMIN SERPL-MCNC: 3.4 G/DL (ref 3.4–5)
ANION GAP SERPL CALC-SCNC: 6 MMOL/L (ref 0–18)
BUN BLD-MCNC: 37 MG/DL (ref 7–18)
BUN/CREAT SERPL: 13.9 (ref 10–20)
CALCIUM BLD-MCNC: 9.4 MG/DL (ref 8.5–10.1)
CHLORIDE SERPL-SCNC: 110 MMOL/L (ref 98–107)
CO2 SERPL-SCNC: 24 MMOL/L (ref 21–32)
CREAT BLD-MCNC: 2.67 MG/DL (ref 0.7–1.3)
CREAT UR-SCNC: 159 MG/DL
GLUCOSE BLD-MCNC: 94 MG/DL (ref 70–99)
OSMOLALITY SERPL CALC.SUM OF ELEC: 298 MOSM/KG (ref 275–295)
PHOSPHATE SERPL-MCNC: 3.5 MG/DL (ref 2.5–4.9)
POTASSIUM SERPL-SCNC: 4.2 MMOL/L (ref 3.5–5.1)
PROT UR-MCNC: 254.7 MG/DL
SODIUM SERPL-SCNC: 140 MMOL/L (ref 136–145)

## 2019-03-10 PROCEDURE — 80069 RENAL FUNCTION PANEL: CPT

## 2019-03-10 PROCEDURE — 83970 ASSAY OF PARATHORMONE: CPT

## 2019-03-10 PROCEDURE — 85060 BLOOD SMEAR INTERPRETATION: CPT

## 2019-03-10 PROCEDURE — 82306 VITAMIN D 25 HYDROXY: CPT

## 2019-03-10 PROCEDURE — 85025 COMPLETE CBC W/AUTO DIFF WBC: CPT

## 2019-03-10 PROCEDURE — 82570 ASSAY OF URINE CREATININE: CPT

## 2019-03-10 PROCEDURE — 84156 ASSAY OF PROTEIN URINE: CPT

## 2019-03-10 PROCEDURE — 36415 COLL VENOUS BLD VENIPUNCTURE: CPT

## 2019-03-11 LAB
25(OH)D3 SERPL-MCNC: 51.6 NG/ML (ref 30–100)
BASOPHILS # BLD AUTO: 0.02 X10(3) UL (ref 0–0.2)
BASOPHILS NFR BLD AUTO: 0.3 %
DEPRECATED RDW RBC AUTO: 59.9 FL (ref 35.1–46.3)
EOSINOPHIL # BLD AUTO: 0.19 X10(3) UL (ref 0–0.7)
EOSINOPHIL NFR BLD AUTO: 3 %
ERYTHROCYTE [DISTWIDTH] IN BLOOD BY AUTOMATED COUNT: 13.7 % (ref 11–15)
HCT VFR BLD AUTO: 39.4 % (ref 39–53)
HGB BLD-MCNC: 12.9 G/DL (ref 13–17.5)
IMM GRANULOCYTES # BLD AUTO: 0.02 X10(3) UL (ref 0–1)
IMM GRANULOCYTES NFR BLD: 0.3 %
LYMPHOCYTES # BLD AUTO: 1.73 X10(3) UL (ref 1–4)
LYMPHOCYTES NFR BLD AUTO: 27.2 %
MCH RBC QN AUTO: 38.3 PG (ref 26–34)
MCHC RBC AUTO-ENTMCNC: 32.7 G/DL (ref 31–37)
MCV RBC AUTO: 116.9 FL (ref 80–100)
MONOCYTES # BLD AUTO: 0.46 X10(3) UL (ref 0.1–1)
MONOCYTES NFR BLD AUTO: 7.2 %
NEUTROPHILS # BLD AUTO: 3.93 X10 (3) UL (ref 1.5–7.7)
NEUTROPHILS # BLD AUTO: 3.93 X10(3) UL (ref 1.5–7.7)
NEUTROPHILS NFR BLD AUTO: 62 %
PLATELET # BLD AUTO: 218 10(3)UL (ref 150–450)
PLATELET MORPHOLOGY: NORMAL
PTH-INTACT SERPL-MCNC: 48.8 PG/ML (ref 18.5–88)
RBC # BLD AUTO: 3.37 X10(6)UL (ref 4.3–5.7)
WBC # BLD AUTO: 6.4 X10(3) UL (ref 4–11)

## 2019-04-04 ENCOUNTER — LAB ENCOUNTER (OUTPATIENT)
Dept: LAB | Age: 59
End: 2019-04-04
Attending: INTERNAL MEDICINE
Payer: MEDICAID

## 2019-04-04 ENCOUNTER — OFFICE VISIT (OUTPATIENT)
Dept: INTERNAL MEDICINE CLINIC | Facility: CLINIC | Age: 59
End: 2019-04-04
Payer: MEDICAID

## 2019-04-04 VITALS
SYSTOLIC BLOOD PRESSURE: 140 MMHG | BODY MASS INDEX: 24.1 KG/M2 | TEMPERATURE: 98 F | HEIGHT: 68 IN | DIASTOLIC BLOOD PRESSURE: 70 MMHG | RESPIRATION RATE: 16 BRPM | OXYGEN SATURATION: 98 % | HEART RATE: 64 BPM | WEIGHT: 159 LBS

## 2019-04-04 DIAGNOSIS — D63.1 ANEMIA IN STAGE 3 CHRONIC KIDNEY DISEASE (HCC): ICD-10-CM

## 2019-04-04 DIAGNOSIS — N18.30 STAGE 3 CHRONIC KIDNEY DISEASE (HCC): ICD-10-CM

## 2019-04-04 DIAGNOSIS — E78.1 HYPERTRIGLYCERIDEMIA: ICD-10-CM

## 2019-04-04 DIAGNOSIS — B20 HUMAN IMMUNODEFICIENCY VIRUS (HIV) DISEASE (HCC): ICD-10-CM

## 2019-04-04 DIAGNOSIS — E78.00 HYPERCHOLESTEROLEMIA: Primary | ICD-10-CM

## 2019-04-04 DIAGNOSIS — R05.8 COUGH DUE TO ACE INHIBITOR: ICD-10-CM

## 2019-04-04 DIAGNOSIS — E78.00 HYPERCHOLESTEROLEMIA: ICD-10-CM

## 2019-04-04 DIAGNOSIS — N18.30 ANEMIA IN STAGE 3 CHRONIC KIDNEY DISEASE (HCC): ICD-10-CM

## 2019-04-04 DIAGNOSIS — J43.9 PULMONARY EMPHYSEMA, UNSPECIFIED EMPHYSEMA TYPE (HCC): ICD-10-CM

## 2019-04-04 DIAGNOSIS — T46.4X5A COUGH DUE TO ACE INHIBITOR: ICD-10-CM

## 2019-04-04 DIAGNOSIS — I35.1 AORTIC VALVE INSUFFICIENCY, ETIOLOGY OF CARDIAC VALVE DISEASE UNSPECIFIED: ICD-10-CM

## 2019-04-04 PROCEDURE — 82607 VITAMIN B-12: CPT

## 2019-04-04 PROCEDURE — 82746 ASSAY OF FOLIC ACID SERUM: CPT

## 2019-04-04 PROCEDURE — 99214 OFFICE O/P EST MOD 30 MIN: CPT | Performed by: INTERNAL MEDICINE

## 2019-04-04 PROCEDURE — 36415 COLL VENOUS BLD VENIPUNCTURE: CPT

## 2019-04-04 PROCEDURE — 84450 TRANSFERASE (AST) (SGOT): CPT

## 2019-04-04 PROCEDURE — 99212 OFFICE O/P EST SF 10 MIN: CPT | Performed by: INTERNAL MEDICINE

## 2019-04-04 PROCEDURE — 80061 LIPID PANEL: CPT

## 2019-04-04 PROCEDURE — 84460 ALANINE AMINO (ALT) (SGPT): CPT

## 2019-04-04 PROCEDURE — 83721 ASSAY OF BLOOD LIPOPROTEIN: CPT

## 2019-04-04 PROCEDURE — 85025 COMPLETE CBC W/AUTO DIFF WBC: CPT

## 2019-04-04 NOTE — PATIENT INSTRUCTIONS
1.  Patient is to continue his current diet, medication and activity. 2.  I will obtain blood tests on the patient which include a CBC, vitamin N88 level, folic acid level, lipid panel, AST and ALT.   I will discuss results with the patient when they becom

## 2019-04-04 NOTE — PROGRESS NOTES
Mary Anne Landa is a 63year old male. Patient presents with: Allergies: Patient c/o possible allergies due to dry cough, runny nose, and sinus congestion for past 6 months. Checkup: 8 month checkup.    Hyperlipidemia  Renal Insufficiency  Hypertension    HP Take 150 mg by mouth 2 (two) times daily. Disp:  Rfl:    RaNITidine HCl 150 MG Oral Tab Take 150 mg by mouth 2 (two) times daily. Disp:  Rfl:    Fluticasone Propionate  MCG/ACT Inhalation Aerosol Inhale 1 puff into the lungs 2 (two) times daily.  Dis not be measured. I have given the patient in order to have blood tests today which will include a CBC, vitamin A93 level, folic acid level, lipid panel, AST and ALT. I will plan see the patient back in 6 months for his annual physical examination.   All with  to see if he wishes to switch him to another medication in place of lisinopril. I have told him that lisinopril is an ACE inhibitor in this category of medications is well known to cause a chronic cough. The cough can be severe at times.

## 2019-04-16 ENCOUNTER — HOSPITAL ENCOUNTER (OUTPATIENT)
Dept: GENERAL RADIOLOGY | Facility: HOSPITAL | Age: 59
Discharge: HOME | End: 2019-04-16
Attending: INTERNAL MEDICINE
Payer: MEDICAID

## 2019-04-16 ENCOUNTER — OFFICE VISIT (OUTPATIENT)
Dept: ORTHOPEDICS CLINIC | Facility: CLINIC | Age: 59
End: 2019-04-16
Payer: MEDICAID

## 2019-04-16 DIAGNOSIS — M16.11 PRIMARY OSTEOARTHRITIS OF RIGHT HIP: Primary | ICD-10-CM

## 2019-04-16 DIAGNOSIS — R05.8 COUGH DUE TO ACE INHIBITOR: ICD-10-CM

## 2019-04-16 DIAGNOSIS — T46.4X5A COUGH DUE TO ACE INHIBITOR: ICD-10-CM

## 2019-04-16 DIAGNOSIS — S39.012A LUMBAR STRAIN, INITIAL ENCOUNTER: ICD-10-CM

## 2019-04-16 PROCEDURE — 99213 OFFICE O/P EST LOW 20 MIN: CPT | Performed by: ORTHOPAEDIC SURGERY

## 2019-04-16 PROCEDURE — 71046 X-RAY EXAM CHEST 2 VIEWS: CPT | Performed by: INTERNAL MEDICINE

## 2019-04-16 PROCEDURE — 99212 OFFICE O/P EST SF 10 MIN: CPT | Performed by: ORTHOPAEDIC SURGERY

## 2019-04-16 NOTE — PROGRESS NOTES
This is a pleasant 21-year-old male with a previous diagnosis of right hip osteoarthritis and lumbar spine strain. Patient performed a course of physical therapy since the last visit and reports that his pain is improved.   He continues to have intermitten

## 2019-04-22 ENCOUNTER — PATIENT MESSAGE (OUTPATIENT)
Dept: INTERNAL MEDICINE CLINIC | Facility: CLINIC | Age: 59
End: 2019-04-22

## 2019-04-22 NOTE — TELEPHONE ENCOUNTER
From: Cheko Leonardo  To: Yuliana Martin MD  Sent: 4/22/2019 12:44 AM CDT  Subject: Prescription Question    Dr Coral Browning say's I have Osteoarthritis in my right knee and hip. Tylenol does nothing. I am requesting something for pain management.  Remember

## 2019-04-30 ENCOUNTER — TELEPHONE (OUTPATIENT)
Dept: PHYSICAL THERAPY | Facility: HOSPITAL | Age: 59
End: 2019-04-30

## 2019-06-21 ENCOUNTER — OFFICE VISIT (OUTPATIENT)
Dept: INTERNAL MEDICINE CLINIC | Facility: CLINIC | Age: 59
End: 2019-06-21
Payer: MEDICAID

## 2019-06-21 VITALS
TEMPERATURE: 98 F | BODY MASS INDEX: 24.33 KG/M2 | WEIGHT: 160.56 LBS | OXYGEN SATURATION: 98 % | HEART RATE: 76 BPM | SYSTOLIC BLOOD PRESSURE: 126 MMHG | HEIGHT: 68 IN | DIASTOLIC BLOOD PRESSURE: 76 MMHG

## 2019-06-21 DIAGNOSIS — I10 ESSENTIAL HYPERTENSION: ICD-10-CM

## 2019-06-21 DIAGNOSIS — E53.8 FOLIC ACID DEFICIENCY: ICD-10-CM

## 2019-06-21 DIAGNOSIS — E78.1 HYPERTRIGLYCERIDEMIA: ICD-10-CM

## 2019-06-21 DIAGNOSIS — M15.9 PRIMARY OSTEOARTHRITIS INVOLVING MULTIPLE JOINTS: Primary | ICD-10-CM

## 2019-06-21 DIAGNOSIS — I35.1 AORTIC VALVE INSUFFICIENCY, ETIOLOGY OF CARDIAC VALVE DISEASE UNSPECIFIED: ICD-10-CM

## 2019-06-21 DIAGNOSIS — N18.30 STAGE 3 CHRONIC KIDNEY DISEASE (HCC): ICD-10-CM

## 2019-06-21 DIAGNOSIS — E78.00 HYPERCHOLESTEROLEMIA: ICD-10-CM

## 2019-06-21 DIAGNOSIS — J43.9 PULMONARY EMPHYSEMA, UNSPECIFIED EMPHYSEMA TYPE (HCC): ICD-10-CM

## 2019-06-21 DIAGNOSIS — M17.11 PRIMARY OSTEOARTHRITIS OF RIGHT KNEE: ICD-10-CM

## 2019-06-21 DIAGNOSIS — D75.89 MACROCYTOSIS: ICD-10-CM

## 2019-06-21 PROBLEM — M15.0 PRIMARY OSTEOARTHRITIS INVOLVING MULTIPLE JOINTS: Status: ACTIVE | Noted: 2019-06-21

## 2019-06-21 PROCEDURE — 99214 OFFICE O/P EST MOD 30 MIN: CPT | Performed by: INTERNAL MEDICINE

## 2019-06-21 PROCEDURE — 99212 OFFICE O/P EST SF 10 MIN: CPT | Performed by: INTERNAL MEDICINE

## 2019-06-21 RX ORDER — HYDROCODONE BITARTRATE AND ACETAMINOPHEN 5; 325 MG/1; MG/1
1 TABLET ORAL 2 TIMES DAILY PRN
Qty: 30 TABLET | Refills: 0 | Status: SHIPPED | OUTPATIENT
Start: 2019-06-21 | End: 2019-11-07

## 2019-06-21 RX ORDER — FOLIC ACID 1 MG/1
1 TABLET ORAL DAILY
Qty: 90 TABLET | Refills: 3 | Status: SHIPPED | OUTPATIENT
Start: 2019-06-21 | End: 2020-07-20

## 2019-06-21 NOTE — PROGRESS NOTES
Luanne Ngo is a 63year old male. Patient presents with:  Pain: RT Hip and RT Knee Pain. Pain Score 6. Pt see's Dr. Antonio Lozada whom ordered Physical Therapy. Pt plans to schedule at [address] [place] [address].  Currently taking Tylenol ES OTC 2-3 daily without success in treating O Inhaler Rfl: 3   Fluticasone Propionate  MCG/ACT Inhalation Aerosol Inhale 1 puff into the lungs 2 (two) times daily. Disp: 1 Inhaler Rfl: 3   Sertraline HCl 100 MG Oral Tab Take 100 mg by mouth once daily.  Disp:  Rfl: 0   temazepam 30 MG Oral Cap T distress  HEENT: normal oropharynx, normal TM's. Ears are normal. Eyes are normal  NECK: supple,no lymphadenopathy or masses, no bruits  CHEST: Well-developed male.   LUNGS: clear to auscultation  CARDIO: RRR, normal S1S2, without murmur   GI:Protuberant, B

## 2019-06-21 NOTE — PATIENT INSTRUCTIONS
1.  Patient is to continue his current diet, medication and activity. 2.  Patient has been advised to stop alcohol entirely. 3.  I will start the patient on folic acid 1 mg orally daily.   4.  I have given the patient a prescription for Norco 5/325 that

## 2019-06-24 ENCOUNTER — TELEPHONE (OUTPATIENT)
Dept: INTERNAL MEDICINE CLINIC | Facility: CLINIC | Age: 59
End: 2019-06-24

## 2019-06-24 NOTE — TELEPHONE ENCOUNTER
Pt. States pharmacy will send over clarification for Hydrocodone Rx ph. # [phone]    [facility] ph.  # [phone]  Routed to Rx

## 2019-06-24 NOTE — TELEPHONE ENCOUNTER
I spoke with [facility] and its learning is requesting a PA because patient is also on a benzo. They will fax PA request over.

## 2019-06-26 NOTE — TELEPHONE ENCOUNTER
Received Denial from [facility] for Hydrocodone - Acetaminophen 5-325 mg    In red folder

## 2019-07-17 ENCOUNTER — APPOINTMENT (OUTPATIENT)
Dept: PHYSICAL THERAPY | Age: 59
End: 2019-07-17
Attending: ORTHOPAEDIC SURGERY
Payer: MEDICAID

## 2019-07-17 ENCOUNTER — TELEPHONE (OUTPATIENT)
Dept: PHYSICAL THERAPY | Facility: HOSPITAL | Age: 59
End: 2019-07-17

## 2019-07-30 ENCOUNTER — TELEPHONE (OUTPATIENT)
Dept: PHYSICAL THERAPY | Age: 59
End: 2019-07-30

## 2019-08-01 ENCOUNTER — OFFICE VISIT (OUTPATIENT)
Dept: PHYSICAL THERAPY | Age: 59
End: 2019-08-01
Attending: ORTHOPAEDIC SURGERY
Payer: MEDICAID

## 2019-08-01 DIAGNOSIS — S39.012A LUMBAR STRAIN, INITIAL ENCOUNTER: ICD-10-CM

## 2019-08-01 DIAGNOSIS — M16.11 PRIMARY OSTEOARTHRITIS OF RIGHT HIP: ICD-10-CM

## 2019-08-01 PROCEDURE — 97162 PT EVAL MOD COMPLEX 30 MIN: CPT

## 2019-08-01 PROCEDURE — 97110 THERAPEUTIC EXERCISES: CPT

## 2019-08-01 NOTE — PROGRESS NOTES
LOWER EXTREMITY EVALUATION:   Referring Physician: Dr. Samantha Vidal DX:  Primary osteoarthritis of right hip (M16.11)  Lumbar strain, initial encounter (E64.860H)        PATIENT SUMMARY   Cristian Cordova is a 63year old y/o male who presents to thera Observation/Posture: slouched sitting posture  Gait: slightly antalgic, decreased stance on right  Palpation: TTP at greater trochanter right hip   Edema: None  Flexibility: decreased quadriceps flexibility  Range of motion: decreased hip internal and ex Training; Electrical Stim; Patient education; Home exercise program instruction    Education or treatment limitation: None  Rehab Potential:excellent      FOTO: 43/100  LEFS: 32    Patient was advised of these findings, precautions, and treatment options a

## 2019-08-06 ENCOUNTER — OFFICE VISIT (OUTPATIENT)
Dept: PHYSICAL THERAPY | Age: 59
End: 2019-08-06
Attending: ORTHOPAEDIC SURGERY
Payer: MEDICAID

## 2019-08-06 DIAGNOSIS — M16.11 PRIMARY OSTEOARTHRITIS OF RIGHT HIP: ICD-10-CM

## 2019-08-06 DIAGNOSIS — S39.012A LUMBAR STRAIN, INITIAL ENCOUNTER: ICD-10-CM

## 2019-08-06 PROCEDURE — 97110 THERAPEUTIC EXERCISES: CPT

## 2019-08-06 NOTE — PROGRESS NOTES
Associated DX:  Primary osteoarthritis of right hip (M16.11)  Lumbar strain, initial encounter (S39.012A)   Authorized # of Visits:  8        [facility] MD visit: not scheduled  Fall Risk: standard         Precautions: n/a

## 2019-08-13 ENCOUNTER — OFFICE VISIT (OUTPATIENT)
Dept: PHYSICAL THERAPY | Age: 59
End: 2019-08-13
Attending: ORTHOPAEDIC SURGERY
Payer: MEDICAID

## 2019-08-13 PROCEDURE — 97110 THERAPEUTIC EXERCISES: CPT

## 2019-08-13 NOTE — PROGRESS NOTES
Associated DX:  Primary osteoarthritis of right hip (M16.11)  Lumbar strain, initial encounter (S39.012A)   Authorized # of Visits:  8 (expires 8/31/19)       Insurance:[facility] MD visit: not scheduled  Fall Risk: marino Mccormick

## 2019-08-15 ENCOUNTER — OFFICE VISIT (OUTPATIENT)
Dept: PHYSICAL THERAPY | Age: 59
End: 2019-08-15
Attending: ORTHOPAEDIC SURGERY
Payer: MEDICAID

## 2019-08-15 PROCEDURE — 97110 THERAPEUTIC EXERCISES: CPT

## 2019-08-15 NOTE — PROGRESS NOTES
Associated DX:  Primary osteoarthritis of right hip (M16.11)  Lumbar strain, initial encounter (S39.012A)   Authorized # of Visits:  8 (expires 8/31/19)       Insurance:[facility] MD visit: not scheduled  Fall Risk: standard         Santhosh Urias

## 2019-08-20 ENCOUNTER — APPOINTMENT (OUTPATIENT)
Dept: PHYSICAL THERAPY | Age: 59
End: 2019-08-20
Attending: ORTHOPAEDIC SURGERY
Payer: MEDICAID

## 2019-08-22 ENCOUNTER — APPOINTMENT (OUTPATIENT)
Dept: PHYSICAL THERAPY | Age: 59
End: 2019-08-22
Attending: ORTHOPAEDIC SURGERY
Payer: MEDICAID

## 2019-11-07 ENCOUNTER — OFFICE VISIT (OUTPATIENT)
Dept: INTERNAL MEDICINE CLINIC | Facility: CLINIC | Age: 59
End: 2019-11-07
Payer: MEDICAID

## 2019-11-07 VITALS
BODY MASS INDEX: 24.46 KG/M2 | OXYGEN SATURATION: 98 % | HEIGHT: 68.3 IN | WEIGHT: 161.38 LBS | SYSTOLIC BLOOD PRESSURE: 120 MMHG | DIASTOLIC BLOOD PRESSURE: 80 MMHG | HEART RATE: 80 BPM | TEMPERATURE: 98 F

## 2019-11-07 DIAGNOSIS — N18.30 STAGE 3 CHRONIC KIDNEY DISEASE (HCC): ICD-10-CM

## 2019-11-07 DIAGNOSIS — N18.30 ANEMIA IN STAGE 3 CHRONIC KIDNEY DISEASE (HCC): ICD-10-CM

## 2019-11-07 DIAGNOSIS — E78.1 HYPERTRIGLYCERIDEMIA: ICD-10-CM

## 2019-11-07 DIAGNOSIS — I10 ESSENTIAL HYPERTENSION: ICD-10-CM

## 2019-11-07 DIAGNOSIS — I35.1 AORTIC VALVE INSUFFICIENCY, ETIOLOGY OF CARDIAC VALVE DISEASE UNSPECIFIED: ICD-10-CM

## 2019-11-07 DIAGNOSIS — D63.1 ANEMIA IN STAGE 3 CHRONIC KIDNEY DISEASE (HCC): ICD-10-CM

## 2019-11-07 DIAGNOSIS — E53.8 FOLIC ACID DEFICIENCY: ICD-10-CM

## 2019-11-07 DIAGNOSIS — G89.29 CHRONIC PAIN OF LEFT KNEE: ICD-10-CM

## 2019-11-07 DIAGNOSIS — R53.83 FATIGUE, UNSPECIFIED TYPE: ICD-10-CM

## 2019-11-07 DIAGNOSIS — M25.562 CHRONIC PAIN OF LEFT KNEE: ICD-10-CM

## 2019-11-07 DIAGNOSIS — J43.9 PULMONARY EMPHYSEMA, UNSPECIFIED EMPHYSEMA TYPE (HCC): ICD-10-CM

## 2019-11-07 DIAGNOSIS — Z12.5 PROSTATE CANCER SCREENING: ICD-10-CM

## 2019-11-07 DIAGNOSIS — E78.00 HYPERCHOLESTEROLEMIA: ICD-10-CM

## 2019-11-07 DIAGNOSIS — B20 HUMAN IMMUNODEFICIENCY VIRUS (HIV) DISEASE (HCC): ICD-10-CM

## 2019-11-07 DIAGNOSIS — M17.11 PRIMARY OSTEOARTHRITIS OF RIGHT KNEE: ICD-10-CM

## 2019-11-07 DIAGNOSIS — Z00.00 ANNUAL PHYSICAL EXAM: Primary | ICD-10-CM

## 2019-11-07 DIAGNOSIS — M15.9 PRIMARY OSTEOARTHRITIS INVOLVING MULTIPLE JOINTS: ICD-10-CM

## 2019-11-07 DIAGNOSIS — R94.31 ABNORMAL EKG: ICD-10-CM

## 2019-11-07 PROCEDURE — 99214 OFFICE O/P EST MOD 30 MIN: CPT | Performed by: INTERNAL MEDICINE

## 2019-11-07 PROCEDURE — 99396 PREV VISIT EST AGE 40-64: CPT | Performed by: INTERNAL MEDICINE

## 2019-11-07 PROCEDURE — 93000 ELECTROCARDIOGRAM COMPLETE: CPT | Performed by: INTERNAL MEDICINE

## 2019-11-07 PROCEDURE — 90471 IMMUNIZATION ADMIN: CPT | Performed by: INTERNAL MEDICINE

## 2019-11-07 PROCEDURE — 90686 IIV4 VACC NO PRSV 0.5 ML IM: CPT | Performed by: INTERNAL MEDICINE

## 2019-11-07 RX ORDER — LOSARTAN POTASSIUM 25 MG/1
1 TABLET ORAL DAILY
Refills: 6 | COMMUNITY
Start: 2019-10-15

## 2019-11-07 RX ORDER — ATORVASTATIN CALCIUM 10 MG/1
10 TABLET, FILM COATED ORAL NIGHTLY
Qty: 90 TABLET | Refills: 3 | Status: SHIPPED | OUTPATIENT
Start: 2019-11-07 | End: 2020-11-11

## 2019-11-07 RX ORDER — TRAZODONE HYDROCHLORIDE 50 MG/1
50 TABLET ORAL NIGHTLY
Qty: 30 TABLET | Refills: 1 | Status: SHIPPED | OUTPATIENT
Start: 2019-11-07 | End: 2020-02-24

## 2019-11-07 RX ORDER — TRAZODONE HYDROCHLORIDE 50 MG/1
50 TABLET ORAL NIGHTLY
Refills: 0 | Status: CANCELLED | OUTPATIENT
Start: 2019-11-07

## 2019-11-07 RX ORDER — HYDROCODONE BITARTRATE AND ACETAMINOPHEN 5; 325 MG/1; MG/1
1 TABLET ORAL 2 TIMES DAILY PRN
Qty: 30 TABLET | Refills: 0 | Status: SHIPPED | OUTPATIENT
Start: 2019-11-07 | End: 2020-09-29 | Stop reason: ALTCHOICE

## 2019-11-07 RX ORDER — HYDROCODONE BITARTRATE AND ACETAMINOPHEN 5; 325 MG/1; MG/1
1 TABLET ORAL 2 TIMES DAILY PRN
Qty: 30 TABLET | Refills: 0 | Status: CANCELLED | OUTPATIENT
Start: 2019-11-07 | End: 2020-11-06

## 2019-11-07 NOTE — PATIENT INSTRUCTIONS
1.  Patient is to continue his current diet, medication and activity. 2.  Patient is to stop all alcohol and time. 3.  Patient was given his flu vaccine today. 4.  Patient to take Metamucil 1 tablespoon in 8 ounces of water once or twice a day.   5.  I w

## 2019-11-08 NOTE — PROGRESS NOTES
Lore Dinero is a 63year old male who presents for a complete physical exam.   HPI:   Mr. Eula SaenzZeb Montero is a 45-year-old black male who was seen by me on November 7, 2019 for his annual physical examination.   At the time of examination Mr. Fidel Montero was feeli Aerosol Inhale 1 puff into the lungs 2 (two) times daily. 1 Inhaler 3   • Sertraline HCl 100 MG Oral Tab Take 100 mg by mouth once daily. 0   • temazepam 30 MG Oral Cap Take 30 mg by mouth daily.   0   • CloNIDine HCl 0.1 MG Oral Tab Take 0.1 mg by mouth 2 melena  : No urinary complaints  NEURO: denies headaches or dizziness    EXAM:   /80 (BP Location: Right arm, Patient Position: Sitting, Cuff Size: adult)   Pulse 80   Temp 97.9 °F (36.6 °C) (Oral)   Ht 5' 8.3\" (1.735 m)   Wt 161 lb 6.4 oz (73.2 k help with his bowels. Patient was given a prescription for Lipitor 10 mg orally to be taken every evening. Patient was also given a flu vaccine today.   I will see the patient back in 3 months with blood tests which will include a CBC, CMP, lipid panel, T was 47, creatinine is 2.79 and GFR was 24. Patient has been told that his kidney function has been stable for the past few years. CPM.    11. Anemia in stage 3 chronic kidney disease (HCC)  Stable. CPM.  Patient's CBC is improved today.   Patient's CBC i

## 2019-11-11 ENCOUNTER — TELEPHONE (OUTPATIENT)
Dept: INTERNAL MEDICINE CLINIC | Facility: CLINIC | Age: 59
End: 2019-11-11

## 2019-11-11 NOTE — TELEPHONE ENCOUNTER
[facility] faxed over a PA for Hydrocodone 5/325 Tabs:    \"Plan does not cover this medication. Please call plan at [phone] to initiate PA or call/fax pharmacy to change medication. Patient ID # [id number]. \"    Placed in yellow folder

## 2019-11-21 NOTE — TELEPHONE ENCOUNTER
Pt. Called to follow up on hydrocodone refill request.  Explained the process for PA's to the patient. He expressed understanding. He is now asking if maybe he should be prescribed a different medication because terell told the pt.  That there could be

## 2019-11-21 NOTE — TELEPHONE ENCOUNTER
To Ailyn Acharya-- to advise on interaction between Hydrocodone and Temazepam/should alternative be prescribed for Hydrocodone? To Dr. ALFARO to advise on if you will take over temazepam refills?

## 2019-11-22 NOTE — TELEPHONE ENCOUNTER
.  I attempted to call the patient to discuss the situation with him. The phone rang a number of times in the disconnected.   I will route this message to [facility] to discuss with her on Friday to see about possible reactions with hydrocodone with temazepam

## 2019-11-23 NOTE — TELEPHONE ENCOUNTER
Phone call to patient and message left. Apparently there is an interaction between hydrocodone and temazepam with a sedative effect. In addition patient's medication was denied by his insurance company.   I left a message for the patient that we should pr

## 2019-11-26 RX ORDER — TEMAZEPAM 30 MG/1
30 CAPSULE ORAL DAILY
Qty: 30 CAPSULE | Refills: 5 | Status: SHIPPED | OUTPATIENT
Start: 2019-11-26 | End: 2020-05-19

## 2019-11-26 NOTE — TELEPHONE ENCOUNTER
Spoke to patient and relayed MD message, patient verbalized understanding. Patient is agreeable with stopping Hydrocodone and going back to tylenol extra strength. He reports he never picked up the hydrocodone and has not taken any.  He is hoping to have hi

## 2019-11-26 NOTE — TELEPHONE ENCOUNTER
NOted.  I have refilled pt's med as requested. Please notify pt that this has been done. I will route this to nursing.   Thank you!!

## 2019-12-17 RX ORDER — SERTRALINE HYDROCHLORIDE 100 MG/1
100 TABLET, FILM COATED ORAL
Qty: 90 TABLET | Refills: 3 | Status: SHIPPED | OUTPATIENT
Start: 2019-12-17 | End: 2020-02-24

## 2019-12-17 NOTE — TELEPHONE ENCOUNTER
Pt called to request Sertraline refill   Please send to [facility]  Any questions pt can be reached at [phone]

## 2019-12-18 NOTE — TELEPHONE ENCOUNTER
Noted.  Pt has been on this med for some time. I have renewed his med as requested. Please notify the pt that this has been done. I will route this to nursing.   Thank you!!

## 2020-02-08 ENCOUNTER — LAB ENCOUNTER (OUTPATIENT)
Dept: LAB | Facility: HOSPITAL | Age: 60
End: 2020-02-08
Attending: INTERNAL MEDICINE
Payer: MEDICAID

## 2020-02-08 DIAGNOSIS — R53.83 FATIGUE, UNSPECIFIED TYPE: ICD-10-CM

## 2020-02-08 DIAGNOSIS — Z00.00 ANNUAL PHYSICAL EXAM: ICD-10-CM

## 2020-02-08 DIAGNOSIS — E78.1 HYPERTRIGLYCERIDEMIA: ICD-10-CM

## 2020-02-08 DIAGNOSIS — E78.00 HYPERCHOLESTEROLEMIA: ICD-10-CM

## 2020-02-08 DIAGNOSIS — Z12.5 PROSTATE CANCER SCREENING: ICD-10-CM

## 2020-02-08 LAB
ALBUMIN SERPL-MCNC: 3.6 G/DL (ref 3.4–5)
ALBUMIN/GLOB SERPL: 0.8 {RATIO} (ref 1–2)
ALP LIVER SERPL-CCNC: 72 U/L (ref 45–117)
ALT SERPL-CCNC: 23 U/L (ref 16–61)
ANION GAP SERPL CALC-SCNC: 7 MMOL/L (ref 0–18)
AST SERPL-CCNC: 23 U/L (ref 15–37)
BACTERIA UR QL AUTO: NEGATIVE /HPF
BASOPHILS # BLD AUTO: 0.03 X10(3) UL (ref 0–0.2)
BASOPHILS NFR BLD AUTO: 0.4 %
BILIRUB SERPL-MCNC: 0.4 MG/DL (ref 0.1–2)
BILIRUB UR QL: NEGATIVE
BUN BLD-MCNC: 45 MG/DL (ref 7–18)
BUN/CREAT SERPL: 16.3 (ref 10–20)
CALCIUM BLD-MCNC: 9.7 MG/DL (ref 8.5–10.1)
CHLORIDE SERPL-SCNC: 113 MMOL/L (ref 98–112)
CHOLEST SMN-MCNC: 202 MG/DL (ref ?–200)
CLARITY UR: CLEAR
CO2 SERPL-SCNC: 24 MMOL/L (ref 21–32)
COLOR UR: YELLOW
COMPLEXED PSA SERPL-MCNC: 1.49 NG/ML (ref ?–4)
CREAT BLD-MCNC: 2.76 MG/DL (ref 0.7–1.3)
DEPRECATED RDW RBC AUTO: 56.7 FL (ref 35.1–46.3)
EOSINOPHIL # BLD AUTO: 0.29 X10(3) UL (ref 0–0.7)
EOSINOPHIL NFR BLD AUTO: 3.7 %
ERYTHROCYTE [DISTWIDTH] IN BLOOD BY AUTOMATED COUNT: 13 % (ref 11–15)
GLOBULIN PLAS-MCNC: 4.5 G/DL (ref 2.8–4.4)
GLUCOSE BLD-MCNC: 97 MG/DL (ref 70–99)
GLUCOSE UR-MCNC: NEGATIVE MG/DL
HCT VFR BLD AUTO: 37.6 % (ref 39–53)
HDLC SERPL-MCNC: 37 MG/DL (ref 40–59)
HGB BLD-MCNC: 12.5 G/DL (ref 13–17.5)
IMM GRANULOCYTES # BLD AUTO: 0.02 X10(3) UL (ref 0–1)
IMM GRANULOCYTES NFR BLD: 0.3 %
KETONES UR-MCNC: NEGATIVE MG/DL
LDLC SERPL DIRECT ASSAY-MCNC: 88 MG/DL (ref ?–100)
LEUKOCYTE ESTERASE UR QL STRIP.AUTO: NEGATIVE
LYMPHOCYTES # BLD AUTO: 2.1 X10(3) UL (ref 1–4)
LYMPHOCYTES NFR BLD AUTO: 27 %
M PROTEIN MFR SERPL ELPH: 8.1 G/DL (ref 6.4–8.2)
MCH RBC QN AUTO: 38.9 PG (ref 26–34)
MCHC RBC AUTO-ENTMCNC: 33.2 G/DL (ref 31–37)
MCV RBC AUTO: 117.1 FL (ref 80–100)
MONOCYTES # BLD AUTO: 0.57 X10(3) UL (ref 0.1–1)
MONOCYTES NFR BLD AUTO: 7.3 %
NEUTROPHILS # BLD AUTO: 4.77 X10 (3) UL (ref 1.5–7.7)
NEUTROPHILS # BLD AUTO: 4.77 X10(3) UL (ref 1.5–7.7)
NEUTROPHILS NFR BLD AUTO: 61.3 %
NITRITE UR QL STRIP.AUTO: NEGATIVE
NONHDLC SERPL-MCNC: 165 MG/DL (ref ?–130)
OSMOLALITY SERPL CALC.SUM OF ELEC: 309 MOSM/KG (ref 275–295)
PATIENT FASTING Y/N/NP: YES
PATIENT FASTING Y/N/NP: YES
PH UR: 5 [PH] (ref 5–8)
PLATELET # BLD AUTO: 194 10(3)UL (ref 150–450)
POTASSIUM SERPL-SCNC: 4.7 MMOL/L (ref 3.5–5.1)
PROT UR-MCNC: 100 MG/DL
RBC # BLD AUTO: 3.21 X10(6)UL (ref 4.3–5.7)
RBC #/AREA URNS AUTO: 0 /HPF
SODIUM SERPL-SCNC: 144 MMOL/L (ref 136–145)
SP GR UR STRIP: 1.01 (ref 1–1.03)
TRIGL SERPL-MCNC: 431 MG/DL (ref 30–149)
TSI SER-ACNC: 1.19 MIU/ML (ref 0.36–3.74)
UROBILINOGEN UR STRIP-ACNC: <2
WBC # BLD AUTO: 7.8 X10(3) UL (ref 4–11)
WBC #/AREA URNS AUTO: <1 /HPF

## 2020-02-08 PROCEDURE — 84443 ASSAY THYROID STIM HORMONE: CPT

## 2020-02-08 PROCEDURE — 85025 COMPLETE CBC W/AUTO DIFF WBC: CPT

## 2020-02-08 PROCEDURE — 81001 URINALYSIS AUTO W/SCOPE: CPT

## 2020-02-08 PROCEDURE — 36415 COLL VENOUS BLD VENIPUNCTURE: CPT

## 2020-02-08 PROCEDURE — 80053 COMPREHEN METABOLIC PANEL: CPT

## 2020-02-08 PROCEDURE — 83721 ASSAY OF BLOOD LIPOPROTEIN: CPT

## 2020-02-08 PROCEDURE — 80061 LIPID PANEL: CPT

## 2020-02-20 ENCOUNTER — TELEPHONE (OUTPATIENT)
Dept: INTERNAL MEDICINE CLINIC | Facility: CLINIC | Age: 60
End: 2020-02-20

## 2020-02-20 ENCOUNTER — PATIENT MESSAGE (OUTPATIENT)
Dept: INTERNAL MEDICINE CLINIC | Facility: CLINIC | Age: 60
End: 2020-02-20

## 2020-02-20 NOTE — TELEPHONE ENCOUNTER
Pt cancelled appt for today with Dr River Ferrera  Pt has breathing issues and this weather makes it hard for pt to get out   Pt will call back to reschedule appt  Tasked to nursing as [phone] [address]

## 2020-02-24 ENCOUNTER — OFFICE VISIT (OUTPATIENT)
Dept: INTERNAL MEDICINE CLINIC | Facility: CLINIC | Age: 60
End: 2020-02-24
Payer: MEDICAID

## 2020-02-24 VITALS
SYSTOLIC BLOOD PRESSURE: 120 MMHG | BODY MASS INDEX: 24.65 KG/M2 | TEMPERATURE: 98 F | WEIGHT: 162.63 LBS | OXYGEN SATURATION: 100 % | HEART RATE: 72 BPM | HEIGHT: 68.3 IN | DIASTOLIC BLOOD PRESSURE: 76 MMHG

## 2020-02-24 DIAGNOSIS — J43.9 PULMONARY EMPHYSEMA, UNSPECIFIED EMPHYSEMA TYPE (HCC): ICD-10-CM

## 2020-02-24 DIAGNOSIS — I10 ESSENTIAL HYPERTENSION: Primary | ICD-10-CM

## 2020-02-24 DIAGNOSIS — D63.1 ANEMIA IN STAGE 3 CHRONIC KIDNEY DISEASE (HCC): ICD-10-CM

## 2020-02-24 DIAGNOSIS — B20 HUMAN IMMUNODEFICIENCY VIRUS (HIV) DISEASE (HCC): ICD-10-CM

## 2020-02-24 DIAGNOSIS — I35.1 AORTIC VALVE INSUFFICIENCY, ETIOLOGY OF CARDIAC VALVE DISEASE UNSPECIFIED: ICD-10-CM

## 2020-02-24 DIAGNOSIS — M17.11 PRIMARY OSTEOARTHRITIS OF RIGHT KNEE: ICD-10-CM

## 2020-02-24 DIAGNOSIS — N18.30 ANEMIA IN STAGE 3 CHRONIC KIDNEY DISEASE (HCC): ICD-10-CM

## 2020-02-24 DIAGNOSIS — N18.30 STAGE 3 CHRONIC KIDNEY DISEASE (HCC): ICD-10-CM

## 2020-02-24 DIAGNOSIS — E78.1 HYPERTRIGLYCERIDEMIA: ICD-10-CM

## 2020-02-24 DIAGNOSIS — M25.562 CHRONIC PAIN OF LEFT KNEE: ICD-10-CM

## 2020-02-24 DIAGNOSIS — G89.29 CHRONIC PAIN OF LEFT KNEE: ICD-10-CM

## 2020-02-24 DIAGNOSIS — R53.83 FATIGUE, UNSPECIFIED TYPE: ICD-10-CM

## 2020-02-24 DIAGNOSIS — E78.00 HYPERCHOLESTEROLEMIA: ICD-10-CM

## 2020-02-24 DIAGNOSIS — M15.9 PRIMARY OSTEOARTHRITIS INVOLVING MULTIPLE JOINTS: ICD-10-CM

## 2020-02-24 PROCEDURE — 99214 OFFICE O/P EST MOD 30 MIN: CPT | Performed by: INTERNAL MEDICINE

## 2020-02-24 RX ORDER — ALBUTEROL SULFATE 90 UG/1
1 AEROSOL, METERED RESPIRATORY (INHALATION) EVERY 6 HOURS PRN
Qty: 1 INHALER | Refills: 3 | Status: SHIPPED | OUTPATIENT
Start: 2020-02-24 | End: 2021-12-21

## 2020-02-24 RX ORDER — TRAZODONE HYDROCHLORIDE 50 MG/1
50 TABLET ORAL NIGHTLY
Qty: 30 TABLET | Refills: 3 | Status: SHIPPED | OUTPATIENT
Start: 2020-02-24 | End: 2020-03-27 | Stop reason: DRUGHIGH

## 2020-02-24 RX ORDER — SERTRALINE HYDROCHLORIDE 100 MG/1
100 TABLET, FILM COATED ORAL
Qty: 90 TABLET | Refills: 3 | Status: SHIPPED | OUTPATIENT
Start: 2020-02-24 | End: 2020-10-09

## 2020-02-24 NOTE — PATIENT INSTRUCTIONS
1.  Patient is to continue his current diet, medication and activity. 2.  Patient is to continue to watch his diet more closely, especially regarding his sweets and his carbs.   3.  I will plan to see the patient back in about 6 months with blood tests as

## 2020-02-24 NOTE — PROGRESS NOTES
Joslyn Cavazos is a 63year old male. Patient presents with:  Checkup: 3 month check up. Feels well. Hyperlipidemia  Arthritis  Hypertension    HPI:   Patient presents with:  Checkup: 3 month check up. Feels well.   Hyperlipidemia  Arthritis  Hypertension disease, stage 3 (HCC)    • COPD (chronic obstructive pulmonary disease) (HCC)    • HIV disease ([facility] [address]       Social History:  Social History    Tobacco Use      Smoking status: Former Smoker        Packs/day: 0.50        Years: 20.00        Pack years: 10 37 and LDL cholesterol could not be measured but a direct LDL was 88. CPM.    3. Hypertriglyceridemia  Stable. CPM.  Patient's recent triglyceride reading was 431. Patient will continue to work with his diet.     4. Aortic valve insufficiency, etiology o

## 2020-02-26 ENCOUNTER — TELEPHONE (OUTPATIENT)
Dept: INTERNAL MEDICINE CLINIC | Facility: CLINIC | Age: 60
End: 2020-02-26

## 2020-02-26 NOTE — TELEPHONE ENCOUNTER
Glory Kan / [facility] calling to extend OT for 1 time a week for 1 week  To take him to the end of certification  She will then recertify the pt

## 2020-03-02 NOTE — TELEPHONE ENCOUNTER
Spoke to Ruthy Kumari from [employer]-- patient will have one session this week and then she will fax over a form for MD signature for recertification.      FYI to Dr. Norwood Apt

## 2020-03-27 ENCOUNTER — TELEPHONE (OUTPATIENT)
Dept: INTERNAL MEDICINE CLINIC | Facility: CLINIC | Age: 60
End: 2020-03-27

## 2020-03-27 RX ORDER — TRAZODONE HYDROCHLORIDE 50 MG/1
50 TABLET ORAL 2 TIMES DAILY
Qty: 60 TABLET | Refills: 5 | Status: SHIPPED | OUTPATIENT
Start: 2020-03-27 | End: 2020-10-20

## 2020-03-27 NOTE — TELEPHONE ENCOUNTER
Telephone call to patient to discuss dose of trazodone and message left. I have left a message for the patient to call me back to discuss his dose of trazodone.   I have then called the pharmacy to let them know that I was waiting hear back from the patijames

## 2020-03-27 NOTE — TELEPHONE ENCOUNTER
[facility] pharmacy calling regarding clarification on Tarzodone dosage. Patient is stating he is taking Trazodone 100mg 1 tablet nightly.   Prescription is for 50mg

## 2020-03-27 NOTE — TELEPHONE ENCOUNTER
Patient has returned my call. Discussed with patient. Patient requests a refill for trazodone. Patient has been taking 50 mg orally twice daily. He would prefer to continue taking it this way.   I will send a prescription in for 1 month supply with 5 re

## 2020-05-20 RX ORDER — TEMAZEPAM 30 MG/1
CAPSULE ORAL
Qty: 30 CAPSULE | Refills: 5 | Status: SHIPPED | OUTPATIENT
Start: 2020-05-20 | End: 2020-11-04

## 2020-06-17 NOTE — TELEPHONE ENCOUNTER
Pt called, requesting early refill for Tamazepam  Pt is out of medication   Pt hoping to  today rather than Friday  Tasked to [employer]

## 2020-06-18 NOTE — TELEPHONE ENCOUNTER
Noted. Please call patient's pharmacy to see if they can refill the patient's medications 3-day early. From my perspective it is okay to refill the patient's medication 3 days early. However, his insurance pharmacy plan may not per minute.   I will route

## 2020-07-15 ENCOUNTER — TELEPHONE (OUTPATIENT)
Dept: INTERNAL MEDICINE CLINIC | Facility: CLINIC | Age: 60
End: 2020-07-15

## 2020-07-15 DIAGNOSIS — N18.30 ANEMIA IN STAGE 3 CHRONIC KIDNEY DISEASE (HCC): ICD-10-CM

## 2020-07-15 DIAGNOSIS — R53.83 FATIGUE, UNSPECIFIED TYPE: ICD-10-CM

## 2020-07-15 DIAGNOSIS — E78.1 HYPERTRIGLYCERIDEMIA: ICD-10-CM

## 2020-07-15 DIAGNOSIS — E78.00 HYPERCHOLESTEROLEMIA: ICD-10-CM

## 2020-07-15 DIAGNOSIS — B34.9 VIRAL SYNDROME: Primary | ICD-10-CM

## 2020-07-15 DIAGNOSIS — D63.1 ANEMIA IN STAGE 3 CHRONIC KIDNEY DISEASE (HCC): ICD-10-CM

## 2020-07-15 NOTE — TELEPHONE ENCOUNTER
Pt is going for labs tomorrow prior to appt on 7/20/20 with Dr Winston Escamilla  Pt would also like to be tested for COVID  Can this be done at the same time tomorrow?   Pt does not currently have any related symptoms  Tasked to nursing

## 2020-07-16 NOTE — TELEPHONE ENCOUNTER
Requests that orders for labs & antibody testing are sent to [facility]  Ph# [phone]; pt did not have fax#    Pt will be going today or tomorrow

## 2020-07-16 NOTE — TELEPHONE ENCOUNTER
Lab orders re-entered to be done at [facility]. Orders faxed to [employer] at [phone], confirmation rec'd.

## 2020-07-16 NOTE — TELEPHONE ENCOUNTER
Noted. Telephone call to patient. I notified patient that I have added a order for a COVID antibody blood test to the system. Patient's been have blood test taken tomorrow.

## 2020-07-18 LAB
ABSOLUTE BASOPHILS: 29 CELLS/UL (ref 0–200)
ABSOLUTE EOSINOPHILS: 172 CELLS/UL (ref 15–500)
ABSOLUTE LYMPHOCYTES: 1279 CELLS/UL (ref 850–3900)
ABSOLUTE MONOCYTES: 412 CELLS/UL (ref 200–950)
ABSOLUTE NEUTROPHILS: 3009 CELLS/UL (ref 1500–7800)
ALT: 21 U/L (ref 9–46)
AST: 17 U/L (ref 10–35)
BASOPHILS: 0.6 %
BUN/CREATININE RATIO: 22 (CALC) (ref 6–22)
BUN: 56 MG/DL (ref 7–25)
CALCIUM: 9.4 MG/DL (ref 8.6–10.3)
CARBON DIOXIDE: 18 MMOL/L (ref 20–32)
CHLORIDE: 111 MMOL/L (ref 98–110)
CHOL/HDLC RATIO: 5.7 (CALC)
CHOLESTEROL, TOTAL: 166 MG/DL
CREATININE: 2.52 MG/DL (ref 0.7–1.33)
EGFR IF AFRICN AM: 31 ML/MIN/1.73M2
EGFR IF NONAFRICN AM: 27 ML/MIN/1.73M2
EOSINOPHILS: 3.5 %
GLUCOSE: 119 MG/DL (ref 65–99)
HDL CHOLESTEROL: 29 MG/DL
HEMATOCRIT: 32.5 % (ref 38.5–50)
HEMOGLOBIN: 11.4 G/DL (ref 13.2–17.1)
LYMPHOCYTES: 26.1 %
MCH: 37.4 PG (ref 27–33)
MCHC: 35.1 G/DL (ref 32–36)
MCV: 106.6 FL (ref 80–100)
MONOCYTES: 8.4 %
MPV: 10.1 FL (ref 7.5–12.5)
NEUTROPHILS: 61.4 %
NON-HDL CHOLESTEROL: 137 MG/DL (CALC)
PLATELET COUNT: 210 THOUSAND/UL (ref 140–400)
POTASSIUM: 4.8 MMOL/L (ref 3.5–5.3)
RDW: 12.8 % (ref 11–15)
RED BLOOD CELL COUNT: 3.05 MILLION/UL (ref 4.2–5.8)
SARS COV 2 AB IGG: NEGATIVE
SODIUM: 138 MMOL/L (ref 135–146)
TRIGLYCERIDES: 519 MG/DL
WHITE BLOOD CELL COUNT: 4.9 THOUSAND/UL (ref 3.8–10.8)

## 2020-07-20 ENCOUNTER — OFFICE VISIT (OUTPATIENT)
Dept: INTERNAL MEDICINE CLINIC | Facility: CLINIC | Age: 60
End: 2020-07-20
Payer: MEDICAID

## 2020-07-20 VITALS
OXYGEN SATURATION: 99 % | DIASTOLIC BLOOD PRESSURE: 70 MMHG | HEIGHT: 68.3 IN | SYSTOLIC BLOOD PRESSURE: 126 MMHG | WEIGHT: 161 LBS | BODY MASS INDEX: 24.4 KG/M2 | HEART RATE: 76 BPM | TEMPERATURE: 100 F

## 2020-07-20 DIAGNOSIS — Z12.5 PROSTATE CANCER SCREENING: ICD-10-CM

## 2020-07-20 DIAGNOSIS — N18.4 ANEMIA IN STAGE 4 CHRONIC KIDNEY DISEASE (HCC): ICD-10-CM

## 2020-07-20 DIAGNOSIS — J43.9 PULMONARY EMPHYSEMA, UNSPECIFIED EMPHYSEMA TYPE (HCC): ICD-10-CM

## 2020-07-20 DIAGNOSIS — B20 HUMAN IMMUNODEFICIENCY VIRUS (HIV) DISEASE (HCC): ICD-10-CM

## 2020-07-20 DIAGNOSIS — I35.1 AORTIC VALVE INSUFFICIENCY, ETIOLOGY OF CARDIAC VALVE DISEASE UNSPECIFIED: ICD-10-CM

## 2020-07-20 DIAGNOSIS — M15.9 PRIMARY OSTEOARTHRITIS INVOLVING MULTIPLE JOINTS: ICD-10-CM

## 2020-07-20 DIAGNOSIS — D63.1 ANEMIA IN STAGE 4 CHRONIC KIDNEY DISEASE (HCC): ICD-10-CM

## 2020-07-20 DIAGNOSIS — N18.30 STAGE 3 CHRONIC KIDNEY DISEASE (HCC): ICD-10-CM

## 2020-07-20 DIAGNOSIS — I10 ESSENTIAL HYPERTENSION: Primary | ICD-10-CM

## 2020-07-20 DIAGNOSIS — R73.01 ABNORMAL FASTING GLUCOSE: ICD-10-CM

## 2020-07-20 DIAGNOSIS — M25.562 CHRONIC PAIN OF LEFT KNEE: ICD-10-CM

## 2020-07-20 DIAGNOSIS — E78.00 HYPERCHOLESTEROLEMIA: ICD-10-CM

## 2020-07-20 DIAGNOSIS — Z00.00 ANNUAL PHYSICAL EXAM: ICD-10-CM

## 2020-07-20 DIAGNOSIS — N18.4 STAGE 4 CHRONIC KIDNEY DISEASE (HCC): ICD-10-CM

## 2020-07-20 DIAGNOSIS — R53.83 FATIGUE, UNSPECIFIED TYPE: ICD-10-CM

## 2020-07-20 DIAGNOSIS — G89.29 CHRONIC PAIN OF LEFT KNEE: ICD-10-CM

## 2020-07-20 DIAGNOSIS — E78.1 HYPERTRIGLYCERIDEMIA: ICD-10-CM

## 2020-07-20 DIAGNOSIS — M17.11 PRIMARY OSTEOARTHRITIS OF RIGHT KNEE: ICD-10-CM

## 2020-07-20 PROCEDURE — 3074F SYST BP LT 130 MM HG: CPT | Performed by: INTERNAL MEDICINE

## 2020-07-20 PROCEDURE — 99214 OFFICE O/P EST MOD 30 MIN: CPT | Performed by: INTERNAL MEDICINE

## 2020-07-20 PROCEDURE — 90471 IMMUNIZATION ADMIN: CPT | Performed by: INTERNAL MEDICINE

## 2020-07-20 PROCEDURE — 3008F BODY MASS INDEX DOCD: CPT | Performed by: INTERNAL MEDICINE

## 2020-07-20 PROCEDURE — 90670 PCV13 VACCINE IM: CPT | Performed by: INTERNAL MEDICINE

## 2020-07-20 PROCEDURE — 3078F DIAST BP <80 MM HG: CPT | Performed by: INTERNAL MEDICINE

## 2020-07-20 RX ORDER — FOLIC ACID 1 MG/1
TABLET ORAL
Qty: 90 TABLET | Refills: 3 | Status: SHIPPED | OUTPATIENT
Start: 2020-07-20 | End: 2021-07-14

## 2020-07-20 RX ORDER — AMLODIPINE BESYLATE 5 MG/1
5 TABLET ORAL DAILY
COMMUNITY
Start: 2020-07-01

## 2020-07-20 NOTE — PATIENT INSTRUCTIONS
1.  Patient is to continue his current diet, medication and activity. 2.  Patient be given his Prevnar vaccine today. 3.  I will plan to see the patient back in about 4 months with blood tests, urinalysis and EKG for his annual physical examination.   4.

## 2020-07-20 NOTE — PROGRESS NOTES
César Peña is a 63year old male. Patient presents with:  Pain: onset: 2 weeks ago. c/o pain to LT inner thigh/groin (constant)-usually with walking. reports as a \"pull\". Denies mass/bulge.  He plans to schedule appt with Orthopedic  Hyperlipidemia  Arth daily.  6   • lamiVUDine 150 MG Oral Tab Take 150 mg by mouth 2 (two) times daily.         Past Medical History:   Diagnosis Date   • Chronic kidney disease, stage 3 (HCC)    • COPD (chronic obstructive pulmonary disease) (HCC)    • HIV disease ([facility] given his Prevnar vaccine today. Patient will get a Tdap vaccine at the time of his next visit as well as a flu vaccine. Patient did get a flu vaccine in either late September, October or November in preparation for the upcoming flu season.   I will see t understanding of these issues and agrees to the plan. The patient is asked to return in 4 months with blood tests, urinalysis and EKG for his annual physical examination as noted above. Demetrio Hector MD  7/20/2020  4:24 PM

## 2020-09-01 ENCOUNTER — OFFICE VISIT (OUTPATIENT)
Dept: PULMONOLOGY | Facility: CLINIC | Age: 60
End: 2020-09-01
Payer: MEDICAID

## 2020-09-01 VITALS
OXYGEN SATURATION: 98 % | HEIGHT: 68 IN | RESPIRATION RATE: 18 BRPM | HEART RATE: 98 BPM | WEIGHT: 162.19 LBS | SYSTOLIC BLOOD PRESSURE: 123 MMHG | BODY MASS INDEX: 24.58 KG/M2 | DIASTOLIC BLOOD PRESSURE: 74 MMHG

## 2020-09-01 DIAGNOSIS — G47.33 OSA (OBSTRUCTIVE SLEEP APNEA): ICD-10-CM

## 2020-09-01 DIAGNOSIS — R06.00 DYSPNEA, UNSPECIFIED TYPE: Primary | ICD-10-CM

## 2020-09-01 PROCEDURE — 3074F SYST BP LT 130 MM HG: CPT | Performed by: INTERNAL MEDICINE

## 2020-09-01 PROCEDURE — 3008F BODY MASS INDEX DOCD: CPT | Performed by: INTERNAL MEDICINE

## 2020-09-01 PROCEDURE — 3078F DIAST BP <80 MM HG: CPT | Performed by: INTERNAL MEDICINE

## 2020-09-01 PROCEDURE — 99204 OFFICE O/P NEW MOD 45 MIN: CPT | Performed by: INTERNAL MEDICINE

## 2020-09-01 NOTE — H&P
Referring Physician  Sarah Burns MD    Chief Complaint  Dyspnea    History of Present Illness  Patient is a 52-year-old male who presents to pulmonary clinic for initial visit.   Admits to underlying history of COPD which he states was diagnosed appr reviewed. No pertinent surgical history. Family History  Father: Lung cancer       Social History  Tobacco: 10 pack-year history of tobacco abuse.   Quit 2011  Alcohol: Denies significant intake  Illicit Drugs: Frequent marijuana use    Medications  FOLI PERRL  Cardio: RRR, S1 S2  Respiratory: clear to auscultation bilaterally, no wheezing, rales, rhonchi, crackles  GI: abdomen soft, non tender  Extremities: no clubbing, cyanosis, edema  Neurologic: no gross motor deficits  Skin: warm, dry  Lymphatic: no s

## 2020-09-08 ENCOUNTER — APPOINTMENT (OUTPATIENT)
Dept: LAB | Facility: HOSPITAL | Age: 60
End: 2020-09-08
Attending: INTERNAL MEDICINE
Payer: MEDICAID

## 2020-09-08 DIAGNOSIS — R06.00 DYSPNEA, UNSPECIFIED TYPE: ICD-10-CM

## 2020-09-09 LAB — SARS-COV-2 RNA RESP QL NAA+PROBE: NOT DETECTED

## 2020-09-11 ENCOUNTER — HOSPITAL ENCOUNTER (OUTPATIENT)
Dept: RESPIRATORY THERAPY | Facility: HOSPITAL | Age: 60
Discharge: HOME | End: 2020-09-11
Attending: INTERNAL MEDICINE
Payer: MEDICAID

## 2020-09-11 DIAGNOSIS — R06.00 DYSPNEA, UNSPECIFIED TYPE: ICD-10-CM

## 2020-09-11 PROCEDURE — 94060 EVALUATION OF WHEEZING: CPT | Performed by: INTERNAL MEDICINE

## 2020-09-11 PROCEDURE — 94729 DIFFUSING CAPACITY: CPT | Performed by: INTERNAL MEDICINE

## 2020-09-11 PROCEDURE — 94726 PLETHYSMOGRAPHY LUNG VOLUMES: CPT | Performed by: INTERNAL MEDICINE

## 2020-09-14 NOTE — PROCEDURES
[facility] 12/10/1960 MRN [id number]   Height  76inh Age 63year old   Weight  160 lbs  Sex Male         Spirometry:   FEV1  2.96 L which is  86 % of predictive value   FEv1/FVC  81 %   No sig

## 2020-09-14 NOTE — ADDENDUM NOTE
Encounter addended by: Dayan Ames MD on: 9/14/2020 3:36 PM   Actions taken: Clinical Note Signed, Charge Capture section accepted

## 2020-09-16 ENCOUNTER — TELEPHONE (OUTPATIENT)
Dept: INTERNAL MEDICINE CLINIC | Facility: CLINIC | Age: 60
End: 2020-09-16

## 2020-09-16 DIAGNOSIS — M15.9 PRIMARY OSTEOARTHRITIS INVOLVING MULTIPLE JOINTS: Primary | ICD-10-CM

## 2020-09-16 NOTE — TELEPHONE ENCOUNTER
Pt. Is calling to request a referral for Dr. Lucian Escobareting his appt. Is 9/25 ph.  # [phone]   Routed high to clinical

## 2020-09-17 NOTE — TELEPHONE ENCOUNTER
Noted.  I have approved patient's referral to see Judy Harrell as requested. Please notify the patient that I have placed a referral in the system. I will route this to nursing.   Thank you!!

## 2020-09-29 ENCOUNTER — OFFICE VISIT (OUTPATIENT)
Dept: ORTHOPEDICS CLINIC | Facility: CLINIC | Age: 60
End: 2020-09-29
Payer: MEDICAID

## 2020-09-29 ENCOUNTER — HOSPITAL ENCOUNTER (OUTPATIENT)
Dept: GENERAL RADIOLOGY | Facility: HOSPITAL | Age: 60
Discharge: HOME | End: 2020-09-29
Attending: ORTHOPAEDIC SURGERY
Payer: MEDICAID

## 2020-09-29 VITALS
HEART RATE: 78 BPM | BODY MASS INDEX: 23.95 KG/M2 | HEIGHT: 68 IN | SYSTOLIC BLOOD PRESSURE: 145 MMHG | DIASTOLIC BLOOD PRESSURE: 86 MMHG | WEIGHT: 158 LBS

## 2020-09-29 DIAGNOSIS — G89.29 CHRONIC PAIN OF BOTH KNEES: ICD-10-CM

## 2020-09-29 DIAGNOSIS — M25.561 CHRONIC PAIN OF BOTH KNEES: ICD-10-CM

## 2020-09-29 DIAGNOSIS — M25.561 CHRONIC PAIN OF BOTH KNEES: Primary | ICD-10-CM

## 2020-09-29 DIAGNOSIS — M25.562 CHRONIC PAIN OF BOTH KNEES: Primary | ICD-10-CM

## 2020-09-29 DIAGNOSIS — G89.29 CHRONIC PAIN OF BOTH KNEES: Primary | ICD-10-CM

## 2020-09-29 DIAGNOSIS — M25.562 CHRONIC PAIN OF BOTH KNEES: ICD-10-CM

## 2020-09-29 DIAGNOSIS — M87.051 AVASCULAR NECROSIS OF HIP, RIGHT (HCC): ICD-10-CM

## 2020-09-29 DIAGNOSIS — M76.32 ILIOTIBIAL BAND TENDONITIS OF LEFT SIDE: ICD-10-CM

## 2020-09-29 PROCEDURE — 20610 DRAIN/INJ JOINT/BURSA W/O US: CPT | Performed by: ORTHOPAEDIC SURGERY

## 2020-09-29 PROCEDURE — 73565 X-RAY EXAM OF KNEES: CPT | Performed by: ORTHOPAEDIC SURGERY

## 2020-09-29 PROCEDURE — 3077F SYST BP >= 140 MM HG: CPT | Performed by: ORTHOPAEDIC SURGERY

## 2020-09-29 PROCEDURE — 99244 OFF/OP CNSLTJ NEW/EST MOD 40: CPT | Performed by: ORTHOPAEDIC SURGERY

## 2020-09-29 PROCEDURE — 3079F DIAST BP 80-89 MM HG: CPT | Performed by: ORTHOPAEDIC SURGERY

## 2020-09-29 PROCEDURE — 3008F BODY MASS INDEX DOCD: CPT | Performed by: ORTHOPAEDIC SURGERY

## 2020-09-29 RX ORDER — TRIAMCINOLONE ACETONIDE 40 MG/ML
40 INJECTION, SUSPENSION INTRA-ARTICULAR; INTRAMUSCULAR ONCE
Status: COMPLETED | OUTPATIENT
Start: 2020-09-29 | End: 2020-09-29

## 2020-09-29 RX ADMIN — TRIAMCINOLONE ACETONIDE 40 MG: 40 INJECTION, SUSPENSION INTRA-ARTICULAR; INTRAMUSCULAR at 18:42:00

## 2020-09-29 NOTE — H&P
NURSING INTAKE COMMENTS: Patient presents with:  Consult: patient needs pain management ,he complains of multiple sites with arthritis pain ,knees and bilateral hips       HPI: This 63year old male presents today for other opinions regarding multiple join Oral Tab Take 100 mg by mouth daily. 3   • PREZISTA 600 MG Oral Tab Take 600 mg by mouth 2 (two) times daily. 0   • zidovudine 300 MG Oral Tab Take 300 mg by mouth 2 (two) times daily.   6   • lamiVUDine 150 MG Oral Tab Take 150 mg by mouth 2 (two) times of blood dyscrasia, no Hx DVT/PE  ENDOCRINE: no thyroid or diabetes issues  ALL/ASTHMA: no new hx of severe allergy or asthma    Physical Examination:    Ht 5' 8\" (1.727 m)   Wt 158 lb (71.7 kg)   BMI 24.02 kg/m²   Constitutional: appears well hydrated, a cc well. Some to get into the joint and the suprapatellar pouch region. I ordered a course of physical therapy. If he is not improving, MRI would be indicated to look for avascular necrosis or osteonecrosis of the knee.   If he is improved however he can

## 2020-10-08 ENCOUNTER — TELEPHONE (OUTPATIENT)
Dept: INTERNAL MEDICINE CLINIC | Facility: CLINIC | Age: 60
End: 2020-10-08

## 2020-10-08 RX ORDER — SERTRALINE HYDROCHLORIDE 100 MG/1
TABLET, FILM COATED ORAL
Qty: 90 TABLET | Refills: 3 | OUTPATIENT
Start: 2020-10-08

## 2020-10-09 RX ORDER — SERTRALINE HYDROCHLORIDE 100 MG/1
200 TABLET, FILM COATED ORAL
Qty: 180 TABLET | Refills: 1 | Status: SHIPPED | OUTPATIENT
Start: 2020-10-09 | End: 2021-04-19

## 2020-10-09 NOTE — TELEPHONE ENCOUNTER
Current refill request refused due to refill is either a duplicate request or has active refills at the pharmacy. Check previous templates.     Requested Prescriptions     Refused Prescriptions Disp Refills   • SERTRALINE  MG Oral Tab [Pharmacy Med

## 2020-10-09 NOTE — TELEPHONE ENCOUNTER
To ---pt called as he is almost out of sertraline;   Back in early August, he reports discussion with you about increasing the dose from 100 mg to 200 mg;  He is feeling better and denies sig. ADRs.    Rx for new 200 mg dose pended for you to review  Pt

## 2020-10-14 ENCOUNTER — LAB ENCOUNTER (OUTPATIENT)
Dept: LAB | Facility: HOSPITAL | Age: 60
End: 2020-10-14
Attending: INTERNAL MEDICINE
Payer: MEDICAID

## 2020-10-14 DIAGNOSIS — E55.9 VITAMIN D DEFICIENCY: ICD-10-CM

## 2020-10-14 DIAGNOSIS — D63.1 ANEMIA IN CHRONIC KIDNEY DISEASE (CODE): ICD-10-CM

## 2020-10-14 DIAGNOSIS — I12.9 HYPERTENSIVE KIDNEY DISEASE: ICD-10-CM

## 2020-10-14 DIAGNOSIS — N06.2 ISOLATED PROTEINURIA WITH DIFFUSE MEMBRANOUS GLOMERULONEPHRITIS: ICD-10-CM

## 2020-10-14 DIAGNOSIS — N18.4 CHRONIC KIDNEY DISEASE, STAGE IV (SEVERE) (HCC): Primary | ICD-10-CM

## 2020-10-14 PROCEDURE — 83970 ASSAY OF PARATHORMONE: CPT

## 2020-10-14 PROCEDURE — 80069 RENAL FUNCTION PANEL: CPT

## 2020-10-14 PROCEDURE — 85060 BLOOD SMEAR INTERPRETATION: CPT

## 2020-10-14 PROCEDURE — 85025 COMPLETE CBC W/AUTO DIFF WBC: CPT

## 2020-10-14 PROCEDURE — 82570 ASSAY OF URINE CREATININE: CPT

## 2020-10-14 PROCEDURE — 81001 URINALYSIS AUTO W/SCOPE: CPT

## 2020-10-14 PROCEDURE — 82306 VITAMIN D 25 HYDROXY: CPT

## 2020-10-14 PROCEDURE — 84156 ASSAY OF PROTEIN URINE: CPT

## 2020-10-14 PROCEDURE — 36415 COLL VENOUS BLD VENIPUNCTURE: CPT

## 2020-10-14 PROCEDURE — 82043 UR ALBUMIN QUANTITATIVE: CPT

## 2020-10-20 RX ORDER — TRAZODONE HYDROCHLORIDE 50 MG/1
TABLET ORAL
Qty: 60 TABLET | Refills: 5 | Status: SHIPPED | OUTPATIENT
Start: 2020-10-20 | End: 2021-04-23

## 2020-10-21 NOTE — TELEPHONE ENCOUNTER
To FD, please call pt to remind to schedule PE and labs (orders in system) in November 2020    High interaction with sertraline--to Dr. Osmani Olguin, please review

## 2020-10-23 ENCOUNTER — OFFICE VISIT (OUTPATIENT)
Dept: PAIN CLINIC | Facility: HOSPITAL | Age: 60
End: 2020-10-23
Attending: ORTHOPAEDIC SURGERY
Payer: MEDICAID

## 2020-10-23 VITALS
RESPIRATION RATE: 18 BRPM | DIASTOLIC BLOOD PRESSURE: 69 MMHG | BODY MASS INDEX: 23.49 KG/M2 | WEIGHT: 155 LBS | SYSTOLIC BLOOD PRESSURE: 107 MMHG | HEIGHT: 68 IN | HEART RATE: 83 BPM

## 2020-10-23 DIAGNOSIS — M25.561 CHRONIC PAIN OF BOTH KNEES: ICD-10-CM

## 2020-10-23 DIAGNOSIS — M76.32 ILIOTIBIAL BAND TENDONITIS OF LEFT SIDE: ICD-10-CM

## 2020-10-23 DIAGNOSIS — G89.29 CHRONIC PAIN OF BOTH KNEES: ICD-10-CM

## 2020-10-23 DIAGNOSIS — M25.562 CHRONIC PAIN OF BOTH KNEES: ICD-10-CM

## 2020-10-23 DIAGNOSIS — M79.652 PAIN OF LEFT THIGH: Primary | ICD-10-CM

## 2020-10-23 PROCEDURE — 99201 HC OUTPT EVAL AND MGNT NEW PT LEVEL 1: CPT

## 2020-10-23 NOTE — PROGRESS NOTES
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

## 2020-10-23 NOTE — CHRONIC PAIN
[facility]  Pain Consultation     HISTORY OF PRESENT ILLNESS:  Berta Cano is a 63year old old male referred to the pain clinic by Dr. Marissa Bustos for Chronic pain of both knees  Iliotibial band tendonitis of left side   which began at least atorvastatin (LIPITOR) 10 MG Oral Tab Take 1 tablet (10 mg total) by mouth nightly. 90 tablet 3   • Cholecalciferol 5000 units Oral Tab Take 1 tablet by mouth daily. • CloNIDine HCl 0.1 MG Oral Tab Take 0.1 mg by mouth 2 (two) times daily.   2   • allop deficiency     Fatigue     Dyspnea    Past Medical History:   Diagnosis Date   • Chronic kidney disease, stage 3    • COPD (chronic obstructive pulmonary disease) (HCC)    • HIV disease ([facility] [address]        FAMILY HISTORY:  Family History   Problem Relation Age of Fear of current or ex partner: Not on file        Emotionally abused: Not on file        Physically abused: Not on file        Forced sexual activity: Not on file    Other Topics      Concerns:        Not on file    Social History Narrative      Not on joe months, pulled in PT . Hx: Hernia. TECHNIQUE:     AP pelvis and AP oblique right hip views were obtained.        FINDINGS:          BONES:             Chronic deformity right femoral head with collapse of the articular margin heterogeneous mixed scleros MCHC 33.0 10/14/2020    RDW 14.0 10/14/2020    .0 10/14/2020    MPV 9.5 10/11/2018     Lab Results   Component Value Date     10/14/2020    K 5.6 (H) 10/14/2020     (H) 10/14/2020    CO2 22.0 10/14/2020    BUN 67 (H) 10/14/2020    GLU 93

## 2020-10-27 ENCOUNTER — TELEPHONE (OUTPATIENT)
Dept: INTERNAL MEDICINE CLINIC | Facility: CLINIC | Age: 60
End: 2020-10-27

## 2020-10-27 DIAGNOSIS — Z20.822 EXPOSURE TO COVID-19 VIRUS: Primary | ICD-10-CM

## 2020-10-27 NOTE — TELEPHONE ENCOUNTER
Telephone call to pt. He feels well but he is concerned about a possible exposure to COVID-19.   I will place an order in the system for pt to have a COVID-19 nasal swab test.

## 2020-10-27 NOTE — TELEPHONE ENCOUNTER
Patient is calling he is scheduled on 11/13 for a yearly physical  He is requesting an order to be tested for Covid before he comes into the office  He said he has health issus and he wants to make sure he is not positive  Patient has no symptoms    Please

## 2020-10-27 NOTE — TELEPHONE ENCOUNTER
To Dr. Alley Ferraro as FYI---Pt is asymptomatic with no known exposure to COVID+ person. Pt would like to be tested for COVID before appt with Dr. Estefani Hua to pt that at this time [facility] is testing symptomatic persons or those with direct exposure to COVID+ person.

## 2020-11-04 ENCOUNTER — APPOINTMENT (OUTPATIENT)
Dept: LAB | Facility: HOSPITAL | Age: 60
End: 2020-11-04
Attending: INTERNAL MEDICINE
Payer: MEDICAID

## 2020-11-04 ENCOUNTER — TELEPHONE (OUTPATIENT)
Dept: INTERNAL MEDICINE CLINIC | Facility: CLINIC | Age: 60
End: 2020-11-04

## 2020-11-04 DIAGNOSIS — Z20.822 EXPOSURE TO COVID-19 VIRUS: ICD-10-CM

## 2020-11-04 RX ORDER — TEMAZEPAM 30 MG/1
30 CAPSULE ORAL DAILY
Qty: 30 CAPSULE | Refills: 5 | Status: SHIPPED | OUTPATIENT
Start: 2020-11-04 | End: 2021-04-20

## 2020-11-04 NOTE — TELEPHONE ENCOUNTER
Pt requesting refill for:  Temazepam  Pt uses [facility] as pharmacy  Pt is out of medication  Tasked to [employer]

## 2020-11-04 NOTE — TELEPHONE ENCOUNTER
To MD:  The above refill request is for a controlled substance. Please review pended medication order. Print and sign for staff to fax to pharmacy or prescribe electronically. To DR. ALFARO

## 2020-11-05 NOTE — TELEPHONE ENCOUNTER
Noted.  I have refilled patient's temazepam as requested.   Thank you!! Please call patient to schedule diabetes follow up.

## 2020-11-08 ENCOUNTER — TELEPHONE (OUTPATIENT)
Dept: INTERNAL MEDICINE CLINIC | Facility: CLINIC | Age: 60
End: 2020-11-08

## 2020-11-09 NOTE — TELEPHONE ENCOUNTER
Please call pt and notify him that his recent nasal swab for COVID-19 has come back \"not detected\" which is negative. I will route this to nursing.   Thank you!!

## 2020-11-11 RX ORDER — ATORVASTATIN CALCIUM 10 MG/1
TABLET, FILM COATED ORAL
Qty: 90 TABLET | Refills: 3 | Status: SHIPPED | OUTPATIENT
Start: 2020-11-11 | End: 2021-11-16

## 2020-11-12 NOTE — TELEPHONE ENCOUNTER
Upcoming appt 11/30/20    Refill request is for a maintenance medication and has met the criteria specified in the Ambulatory Medication Refill Standing Order for eligibility, visits, laboratory, alerts and was sent to the requested pharmacy.     Requested

## 2020-11-20 ENCOUNTER — OFFICE VISIT (OUTPATIENT)
Dept: OTOLARYNGOLOGY | Facility: CLINIC | Age: 60
End: 2020-11-20
Payer: MEDICAID

## 2020-11-20 VITALS
TEMPERATURE: 97 F | HEIGHT: 68 IN | SYSTOLIC BLOOD PRESSURE: 130 MMHG | WEIGHT: 155 LBS | DIASTOLIC BLOOD PRESSURE: 58 MMHG | RESPIRATION RATE: 18 BRPM | BODY MASS INDEX: 23.49 KG/M2 | HEART RATE: 76 BPM

## 2020-11-20 DIAGNOSIS — J34.2 DEVIATED SEPTUM: Primary | ICD-10-CM

## 2020-11-20 DIAGNOSIS — K11.8 SUBMANDIBULAR GLAND TENDERNESS: ICD-10-CM

## 2020-11-20 PROCEDURE — 3075F SYST BP GE 130 - 139MM HG: CPT | Performed by: OTOLARYNGOLOGY

## 2020-11-20 PROCEDURE — 3008F BODY MASS INDEX DOCD: CPT | Performed by: OTOLARYNGOLOGY

## 2020-11-20 PROCEDURE — 3078F DIAST BP <80 MM HG: CPT | Performed by: OTOLARYNGOLOGY

## 2020-11-20 PROCEDURE — 99203 OFFICE O/P NEW LOW 30 MIN: CPT | Performed by: OTOLARYNGOLOGY

## 2020-11-20 RX ORDER — FLUTICASONE PROPIONATE 50 MCG
2 SPRAY, SUSPENSION (ML) NASAL DAILY
Qty: 1 BOTTLE | Refills: 3 | Status: ON HOLD | OUTPATIENT
Start: 2020-11-20 | End: 2021-03-10

## 2020-11-20 NOTE — PROGRESS NOTES
Selena Llanes is a 63year old male. Patient presents with:  Throat Problem: c/o issues swallowing, breathing, and coughing x 2 months    HPI:   A few weeks ago he noticed some pain in the left side of his neck.   It became swollen and he was having a hard t COPD (chronic obstructive pulmonary disease) (HCC)    • HIV disease ([facility] [address]       Social History:  Social History    Tobacco Use      Smoking status: Former Smoker        Packs/day: 0.50        Years: 20.00        Pack years: 10        Types: Cigarettes Normal.    Ears Normal Inspection - Right: Normal, Left: Normal. Canal - Left: Normal. TM - Right: Normal, Left: Normal.     ASSESSMENT AND PLAN:   1. Deviated septum  Nasal obstruction with a deviated septum and inferior turbinate hypertrophy.   I discusse

## 2020-11-23 ENCOUNTER — LAB ENCOUNTER (OUTPATIENT)
Dept: LAB | Facility: HOSPITAL | Age: 60
End: 2020-11-23
Attending: INTERNAL MEDICINE
Payer: MEDICAID

## 2020-11-23 DIAGNOSIS — I10 ESSENTIAL HYPERTENSION: ICD-10-CM

## 2020-11-23 DIAGNOSIS — E78.00 HYPERCHOLESTEROLEMIA: ICD-10-CM

## 2020-11-23 DIAGNOSIS — D63.1 ANEMIA IN STAGE 3 CHRONIC KIDNEY DISEASE (HCC): ICD-10-CM

## 2020-11-23 DIAGNOSIS — N18.30 ANEMIA IN STAGE 3 CHRONIC KIDNEY DISEASE (HCC): ICD-10-CM

## 2020-11-23 DIAGNOSIS — E78.1 HYPERTRIGLYCERIDEMIA: ICD-10-CM

## 2020-11-23 DIAGNOSIS — R53.83 FATIGUE, UNSPECIFIED TYPE: ICD-10-CM

## 2020-11-23 DIAGNOSIS — Z12.5 PROSTATE CANCER SCREENING: ICD-10-CM

## 2020-11-23 DIAGNOSIS — N18.30 STAGE 3 CHRONIC KIDNEY DISEASE (HCC): ICD-10-CM

## 2020-11-23 LAB
ALBUMIN SERPL-MCNC: 3.2 G/DL
ALBUMIN/GLOB SERPL: 0.6 {RATIO}
ALP SERPL-CCNC: 101 U/L
ALT SERPL-CCNC: 30 UNITS/L
ANION GAP SERPL CALC-SCNC: 7 MMOL/L
AST SERPL-CCNC: 30 UNITS/L
BILIRUB SERPL-MCNC: 0.3 MG/DL
BUN SERPL-MCNC: 59 MG/DL
BUN/CREAT SERPL: 18.4
CALCIUM SERPL-MCNC: 9.6 MG/DL
CHLORIDE SERPL-SCNC: 109 MMOL/L
CO2 SERPL-SCNC: 26 MMOL/L
CREAT SERPL-MCNC: 3.2 MG/DL
GLOBULIN SER-MCNC: 5.3 G/DL
GLUCOSE SERPL-MCNC: 104 MG/DL
LENGTH OF FAST TIME PATIENT: YES H
POTASSIUM SERPL-SCNC: 4.3 MMOL/L
PROT SERPL-MCNC: 8.5 G/DL
SODIUM SERPL-SCNC: 142 MMOL/L
TSH SERPL-ACNC: 1.13 MCUNITS/ML

## 2020-11-23 PROCEDURE — 36415 COLL VENOUS BLD VENIPUNCTURE: CPT

## 2020-11-23 PROCEDURE — 80053 COMPREHEN METABOLIC PANEL: CPT | Performed by: INTERNAL MEDICINE

## 2020-11-23 PROCEDURE — 85025 COMPLETE CBC W/AUTO DIFF WBC: CPT

## 2020-11-23 PROCEDURE — 84443 ASSAY THYROID STIM HORMONE: CPT | Performed by: INTERNAL MEDICINE

## 2020-11-23 PROCEDURE — 80061 LIPID PANEL: CPT

## 2020-11-23 PROCEDURE — 81001 URINALYSIS AUTO W/SCOPE: CPT | Performed by: INTERNAL MEDICINE

## 2020-11-23 PROCEDURE — 83036 HEMOGLOBIN GLYCOSYLATED A1C: CPT | Performed by: INTERNAL MEDICINE

## 2020-11-23 PROCEDURE — 83721 ASSAY OF BLOOD LIPOPROTEIN: CPT

## 2020-11-30 ENCOUNTER — TELEPHONE (OUTPATIENT)
Dept: INTERNAL MEDICINE CLINIC | Facility: CLINIC | Age: 60
End: 2020-11-30

## 2020-11-30 NOTE — TELEPHONE ENCOUNTER
Patient cancelled his appt today because he woke up with a sore throat    Requests call back with his lab results  [phone]

## 2021-02-15 ENCOUNTER — PATIENT MESSAGE (OUTPATIENT)
Dept: INTERNAL MEDICINE CLINIC | Facility: CLINIC | Age: 61
End: 2021-02-15

## 2021-02-16 ENCOUNTER — TELEPHONE (OUTPATIENT)
Dept: INTERNAL MEDICINE CLINIC | Facility: CLINIC | Age: 61
End: 2021-02-16

## 2021-02-16 DIAGNOSIS — R73.01 ABNORMAL FASTING GLUCOSE: ICD-10-CM

## 2021-02-16 DIAGNOSIS — N18.4 STAGE 4 CHRONIC KIDNEY DISEASE (HCC): ICD-10-CM

## 2021-02-16 DIAGNOSIS — E78.2 MIXED HYPERLIPIDEMIA: ICD-10-CM

## 2021-02-16 DIAGNOSIS — N18.4 ANEMIA DUE TO STAGE 4 CHRONIC KIDNEY DISEASE (HCC): Primary | ICD-10-CM

## 2021-02-16 DIAGNOSIS — D63.1 ANEMIA DUE TO STAGE 4 CHRONIC KIDNEY DISEASE (HCC): Primary | ICD-10-CM

## 2021-02-16 NOTE — TELEPHONE ENCOUNTER
Celina Ramos asking Dr. Chris Zavaleta to place orders for all of his labs and to please send it to [employer]. He will be going for those in a week or two.

## 2021-02-16 NOTE — TELEPHONE ENCOUNTER
Please advise on labs - was last seen in [place] , last labs 11/2020.-to DR. ALFARO    Left message to call back.    [facility]?

## 2021-02-16 NOTE — TELEPHONE ENCOUNTER
From: Swati Chao  To: Royce Maldonado MD  Sent: 2/15/2021 6:10 PM CST  Subject: Non-Urgent Medical Question    Will I be able to receive a COVID vaccine shot at your office after the 24th of February?

## 2021-02-17 NOTE — TELEPHONE ENCOUNTER
Pt. Returned call he will be using the [facility] in [facility] ph. [phone]  Fax.  # [phone]  Routed to clinical

## 2021-02-17 NOTE — TELEPHONE ENCOUNTER
Noted.  I have placed orders in the system for labs to be done. OK to change the labs that I have ordered to [facility] if pt prefers. I will route this to nursing.   Thank you!!

## 2021-02-17 NOTE — TELEPHONE ENCOUNTER
Lab orders faxed to [facility] in [facility] at [phone]-[id number] recieved

## 2021-02-19 ENCOUNTER — OFFICE VISIT (OUTPATIENT)
Dept: OTOLARYNGOLOGY | Facility: CLINIC | Age: 61
End: 2021-02-19
Payer: MEDICAID

## 2021-02-19 VITALS
SYSTOLIC BLOOD PRESSURE: 114 MMHG | HEIGHT: 68 IN | BODY MASS INDEX: 24.32 KG/M2 | TEMPERATURE: 97 F | WEIGHT: 160.5 LBS | DIASTOLIC BLOOD PRESSURE: 72 MMHG

## 2021-02-19 DIAGNOSIS — J34.2 DEVIATED NASAL SEPTUM: Primary | ICD-10-CM

## 2021-02-19 DIAGNOSIS — J34.3 HYPERTROPHY OF NASAL TURBINATES: ICD-10-CM

## 2021-02-19 DIAGNOSIS — J34.2 DEVIATED SEPTUM: Primary | ICD-10-CM

## 2021-02-19 PROCEDURE — 3008F BODY MASS INDEX DOCD: CPT | Performed by: OTOLARYNGOLOGY

## 2021-02-19 PROCEDURE — 3078F DIAST BP <80 MM HG: CPT | Performed by: OTOLARYNGOLOGY

## 2021-02-19 PROCEDURE — 3074F SYST BP LT 130 MM HG: CPT | Performed by: OTOLARYNGOLOGY

## 2021-02-19 PROCEDURE — 99213 OFFICE O/P EST LOW 20 MIN: CPT | Performed by: OTOLARYNGOLOGY

## 2021-02-19 NOTE — PROGRESS NOTES
Markell Sepulveda is a 62year old male. Patient presents with:  Sinus Problem: Patient statedvis here for follow up for deviated septum ,still having troube breathing    HPI:   About 10 years ago he was diagnosed with sleep apnea with a sleep study.   Try to us History    Tobacco Use      Smoking status: Former Smoker        Packs/day: 0.50        Years: 20.00        Pack years: 10        Types: Cigarettes        Quit date: 1/23/2011        Years since quitting: 10.0      Smokeless tobacco: Never Used    Alcohol septum and inferior turbinate hypertrophy. I recommended a septoplasty and turbinate reduction to improve his breathing. Procedure, risks, alternatives, implications discussed. He understands and would like to proceed.   We may need to consider repeat sl

## 2021-02-26 ENCOUNTER — TELEPHONE (OUTPATIENT)
Dept: OTOLARYNGOLOGY | Facility: CLINIC | Age: 61
End: 2021-02-26

## 2021-02-26 LAB
ALT (SGPT): 13 UNITS/L
AST SERPL-CCNC: 13 UNITS/L
ERYTHROCYTE [DISTWIDTH] IN BLOOD: 13.1 %
HBA1C MFR BLD: 5.5 %
HCT VFR BLD CALC: 31.9 %
HGB BLD-MCNC: 11.3 G/DL
MCH RBC QN AUTO: 37.3 PG
MCHC RBC AUTO-ENTMCNC: 35.4 G/DL
MCV RBC AUTO: 105.3 FL
PLATELET # BLD: 222 K/MCL
RBC # BLD: 3.03 10*6/UL
WBC # BLD: 5.5 K/MCL

## 2021-02-26 NOTE — TELEPHONE ENCOUNTER
Wing Nancy sanchez from [employer] she never received clinicals for PA for surgery please advise       Fax # [phone]

## 2021-02-27 LAB
ABSOLUTE BASOPHILS: 22 CELLS/UL (ref 0–200)
ABSOLUTE EOSINOPHILS: 187 CELLS/UL (ref 15–500)
ABSOLUTE LYMPHOCYTES: 1562 CELLS/UL (ref 850–3900)
ABSOLUTE MONOCYTES: 479 CELLS/UL (ref 200–950)
ABSOLUTE NEUTROPHILS: 3251 CELLS/UL (ref 1500–7800)
ALT: 13 U/L (ref 9–46)
AST: 13 U/L (ref 10–35)
BASOPHILS: 0.4 %
CHOL/HDLC RATIO: 5.8 (CALC)
CHOLEST SERPL-MCNC: 173 MG/DL
CHOLESTEROL, TOTAL: 173 MG/DL
EOSINOPHILS: 3.4 %
HDL CHOLESTEROL: 30 MG/DL
HDLC SERPL-MCNC: 30 MG/DL
HEMATOCRIT: 31.9 % (ref 38.5–50)
HEMOGLOBIN A1C: 5.5 % OF TOTAL HGB
HEMOGLOBIN: 11.3 G/DL (ref 13.2–17.1)
LDL-CHOLESTEROL: 103 MG/DL (CALC)
LDLC SERPL CALC-MCNC: 103 MG/DL
LYMPHOCYTES: 28.4 %
MCH: 37.3 PG (ref 27–33)
MCHC: 35.4 G/DL (ref 32–36)
MCV: 105.3 FL (ref 80–100)
MONOCYTES: 8.7 %
MPV: 11.1 FL (ref 7.5–12.5)
NEUTROPHILS: 59.1 %
NON-HDL CHOLESTEROL: 143 MG/DL (CALC)
NONHDLC SERPL-MCNC: 143 MG/DL
PLATELET COUNT: 222 THOUSAND/UL (ref 140–400)
RDW: 13.1 % (ref 11–15)
RED BLOOD CELL COUNT: 3.03 MILLION/UL (ref 4.2–5.8)
TRIGL SERPL-MCNC: 298 MG/DL
TRIGLYCERIDES: 298 MG/DL
WHITE BLOOD CELL COUNT: 5.5 THOUSAND/UL (ref 3.8–10.8)

## 2021-03-07 ENCOUNTER — LAB ENCOUNTER (OUTPATIENT)
Dept: LAB | Facility: HOSPITAL | Age: 61
End: 2021-03-07
Attending: OTOLARYNGOLOGY
Payer: MEDICAID

## 2021-03-07 DIAGNOSIS — Z01.818 PREOP TESTING: ICD-10-CM

## 2021-03-07 LAB — SARS-COV-2 RNA RESP QL NAA+PROBE: NOT DETECTED

## 2021-03-10 ENCOUNTER — ANESTHESIA EVENT (OUTPATIENT)
Dept: SURGERY | Facility: HOSPITAL | Age: 61
End: 2021-03-10
Payer: MEDICAID

## 2021-03-10 ENCOUNTER — HOSPITAL ENCOUNTER (OUTPATIENT)
Facility: HOSPITAL | Age: 61
Setting detail: HOSPITAL OUTPATIENT SURGERY
Discharge: HOME | End: 2021-03-10
Attending: OTOLARYNGOLOGY | Admitting: OTOLARYNGOLOGY
Payer: MEDICAID

## 2021-03-10 ENCOUNTER — TELEPHONE (OUTPATIENT)
Dept: OTOLARYNGOLOGY | Facility: CLINIC | Age: 61
End: 2021-03-10

## 2021-03-10 ENCOUNTER — ANESTHESIA (OUTPATIENT)
Dept: SURGERY | Facility: HOSPITAL | Age: 61
End: 2021-03-10
Payer: MEDICAID

## 2021-03-10 VITALS
RESPIRATION RATE: 16 BRPM | HEART RATE: 78 BPM | DIASTOLIC BLOOD PRESSURE: 69 MMHG | OXYGEN SATURATION: 92 % | TEMPERATURE: 98 F | SYSTOLIC BLOOD PRESSURE: 108 MMHG | HEIGHT: 68 IN | BODY MASS INDEX: 24.25 KG/M2 | WEIGHT: 160 LBS

## 2021-03-10 DIAGNOSIS — Z01.818 PREOP TESTING: Primary | ICD-10-CM

## 2021-03-10 DIAGNOSIS — J34.2 DEVIATED NASAL SEPTUM: ICD-10-CM

## 2021-03-10 DIAGNOSIS — J34.3 HYPERTROPHY OF NASAL TURBINATES: ICD-10-CM

## 2021-03-10 PROCEDURE — 30140 RESECT INFERIOR TURBINATE: CPT | Performed by: OTOLARYNGOLOGY

## 2021-03-10 PROCEDURE — 09SM0ZZ REPOSITION NASAL SEPTUM, OPEN APPROACH: ICD-10-PCS | Performed by: OTOLARYNGOLOGY

## 2021-03-10 PROCEDURE — 09TL0ZZ RESECTION OF NASAL TURBINATE, OPEN APPROACH: ICD-10-PCS | Performed by: OTOLARYNGOLOGY

## 2021-03-10 PROCEDURE — 30520 REPAIR OF NASAL SEPTUM: CPT | Performed by: OTOLARYNGOLOGY

## 2021-03-10 RX ORDER — LIDOCAINE HYDROCHLORIDE AND EPINEPHRINE 10; 10 MG/ML; UG/ML
INJECTION, SOLUTION INFILTRATION; PERINEURAL AS NEEDED
Status: DISCONTINUED | OUTPATIENT
Start: 2021-03-10 | End: 2021-03-10 | Stop reason: HOSPADM

## 2021-03-10 RX ORDER — PROCHLORPERAZINE EDISYLATE 5 MG/ML
5 INJECTION INTRAMUSCULAR; INTRAVENOUS ONCE AS NEEDED
Status: DISCONTINUED | OUTPATIENT
Start: 2021-03-10 | End: 2021-03-10

## 2021-03-10 RX ORDER — HYDROCODONE BITARTRATE AND ACETAMINOPHEN 5; 325 MG/1; MG/1
1 TABLET ORAL AS NEEDED
Status: DISCONTINUED | OUTPATIENT
Start: 2021-03-10 | End: 2021-03-10

## 2021-03-10 RX ORDER — EPHEDRINE SULFATE 50 MG/ML
INJECTION, SOLUTION INTRAVENOUS AS NEEDED
Status: DISCONTINUED | OUTPATIENT
Start: 2021-03-10 | End: 2021-03-10 | Stop reason: SURG

## 2021-03-10 RX ORDER — SODIUM CHLORIDE, SODIUM LACTATE, POTASSIUM CHLORIDE, CALCIUM CHLORIDE 600; 310; 30; 20 MG/100ML; MG/100ML; MG/100ML; MG/100ML
INJECTION, SOLUTION INTRAVENOUS CONTINUOUS
Status: DISCONTINUED | OUTPATIENT
Start: 2021-03-10 | End: 2021-03-10

## 2021-03-10 RX ORDER — LIDOCAINE HYDROCHLORIDE 10 MG/ML
INJECTION, SOLUTION EPIDURAL; INFILTRATION; INTRACAUDAL; PERINEURAL AS NEEDED
Status: DISCONTINUED | OUTPATIENT
Start: 2021-03-10 | End: 2021-03-10 | Stop reason: SURG

## 2021-03-10 RX ORDER — DEXAMETHASONE SODIUM PHOSPHATE 4 MG/ML
VIAL (ML) INJECTION AS NEEDED
Status: DISCONTINUED | OUTPATIENT
Start: 2021-03-10 | End: 2021-03-10 | Stop reason: SURG

## 2021-03-10 RX ORDER — SODIUM CHLORIDE 0.9 % (FLUSH) 0.9 %
10 SYRINGE (ML) INJECTION AS NEEDED
Status: DISCONTINUED | OUTPATIENT
Start: 2021-03-10 | End: 2021-03-10

## 2021-03-10 RX ORDER — LIDOCAINE HYDROCHLORIDE 40 MG/ML
SOLUTION TOPICAL AS NEEDED
Status: DISCONTINUED | OUTPATIENT
Start: 2021-03-10 | End: 2021-03-10 | Stop reason: SURG

## 2021-03-10 RX ORDER — CEPHALEXIN 500 MG/1
500 CAPSULE ORAL EVERY 8 HOURS
Qty: 21 CAPSULE | Refills: 0 | Status: SHIPPED | OUTPATIENT
Start: 2021-03-10 | End: 2021-03-22 | Stop reason: ALTCHOICE

## 2021-03-10 RX ORDER — ONDANSETRON 4 MG/1
4 TABLET, ORALLY DISINTEGRATING ORAL EVERY 6 HOURS PRN
Status: DISCONTINUED | OUTPATIENT
Start: 2021-03-10 | End: 2021-03-10

## 2021-03-10 RX ORDER — HYDROMORPHONE HYDROCHLORIDE 1 MG/ML
0.2 INJECTION, SOLUTION INTRAMUSCULAR; INTRAVENOUS; SUBCUTANEOUS EVERY 5 MIN PRN
Status: DISCONTINUED | OUTPATIENT
Start: 2021-03-10 | End: 2021-03-10

## 2021-03-10 RX ORDER — HYDROMORPHONE HYDROCHLORIDE 1 MG/ML
0.6 INJECTION, SOLUTION INTRAMUSCULAR; INTRAVENOUS; SUBCUTANEOUS EVERY 5 MIN PRN
Status: DISCONTINUED | OUTPATIENT
Start: 2021-03-10 | End: 2021-03-10

## 2021-03-10 RX ORDER — ONDANSETRON 2 MG/ML
4 INJECTION INTRAMUSCULAR; INTRAVENOUS ONCE AS NEEDED
Status: DISCONTINUED | OUTPATIENT
Start: 2021-03-10 | End: 2021-03-10

## 2021-03-10 RX ORDER — MIDAZOLAM HYDROCHLORIDE 1 MG/ML
INJECTION INTRAMUSCULAR; INTRAVENOUS AS NEEDED
Status: DISCONTINUED | OUTPATIENT
Start: 2021-03-10 | End: 2021-03-10 | Stop reason: SURG

## 2021-03-10 RX ORDER — HYDROCODONE BITARTRATE AND ACETAMINOPHEN 5; 325 MG/1; MG/1
2 TABLET ORAL AS NEEDED
Status: DISCONTINUED | OUTPATIENT
Start: 2021-03-10 | End: 2021-03-10

## 2021-03-10 RX ORDER — ONDANSETRON 2 MG/ML
INJECTION INTRAMUSCULAR; INTRAVENOUS AS NEEDED
Status: DISCONTINUED | OUTPATIENT
Start: 2021-03-10 | End: 2021-03-10 | Stop reason: SURG

## 2021-03-10 RX ORDER — HYDROMORPHONE HYDROCHLORIDE 1 MG/ML
0.4 INJECTION, SOLUTION INTRAMUSCULAR; INTRAVENOUS; SUBCUTANEOUS EVERY 5 MIN PRN
Status: DISCONTINUED | OUTPATIENT
Start: 2021-03-10 | End: 2021-03-10

## 2021-03-10 RX ORDER — MORPHINE SULFATE 10 MG/ML
6 INJECTION, SOLUTION INTRAMUSCULAR; INTRAVENOUS EVERY 10 MIN PRN
Status: DISCONTINUED | OUTPATIENT
Start: 2021-03-10 | End: 2021-03-10

## 2021-03-10 RX ORDER — MORPHINE SULFATE 4 MG/ML
4 INJECTION, SOLUTION INTRAMUSCULAR; INTRAVENOUS EVERY 10 MIN PRN
Status: DISCONTINUED | OUTPATIENT
Start: 2021-03-10 | End: 2021-03-10

## 2021-03-10 RX ORDER — HYDROCODONE BITARTRATE AND ACETAMINOPHEN 5; 325 MG/1; MG/1
TABLET ORAL
Qty: 20 TABLET | Refills: 0 | Status: ON HOLD | OUTPATIENT
Start: 2021-03-10 | End: 2021-06-18

## 2021-03-10 RX ORDER — SODIUM CHLORIDE/ALOE VERA
GEL (GRAM) NASAL AS NEEDED
Status: DISCONTINUED | OUTPATIENT
Start: 2021-03-10 | End: 2021-03-10 | Stop reason: HOSPADM

## 2021-03-10 RX ORDER — MORPHINE SULFATE 4 MG/ML
2 INJECTION, SOLUTION INTRAMUSCULAR; INTRAVENOUS EVERY 10 MIN PRN
Status: DISCONTINUED | OUTPATIENT
Start: 2021-03-10 | End: 2021-03-10

## 2021-03-10 RX ORDER — HYDROCODONE BITARTRATE AND ACETAMINOPHEN 5; 325 MG/1; MG/1
1 TABLET ORAL EVERY 4 HOURS PRN
Status: DISCONTINUED | OUTPATIENT
Start: 2021-03-10 | End: 2021-03-10

## 2021-03-10 RX ORDER — DIAPER,BRIEF,INFANT-TODD,DISP
EACH MISCELLANEOUS AS NEEDED
Status: DISCONTINUED | OUTPATIENT
Start: 2021-03-10 | End: 2021-03-10 | Stop reason: HOSPADM

## 2021-03-10 RX ORDER — NALOXONE HYDROCHLORIDE 0.4 MG/ML
80 INJECTION, SOLUTION INTRAMUSCULAR; INTRAVENOUS; SUBCUTANEOUS AS NEEDED
Status: DISCONTINUED | OUTPATIENT
Start: 2021-03-10 | End: 2021-03-10

## 2021-03-10 RX ORDER — ONDANSETRON 2 MG/ML
4 INJECTION INTRAMUSCULAR; INTRAVENOUS EVERY 6 HOURS PRN
Status: DISCONTINUED | OUTPATIENT
Start: 2021-03-10 | End: 2021-03-10

## 2021-03-10 RX ORDER — ACETAMINOPHEN 500 MG
1000 TABLET ORAL ONCE
Status: COMPLETED | OUTPATIENT
Start: 2021-03-10 | End: 2021-03-10

## 2021-03-10 RX ORDER — HALOPERIDOL 5 MG/ML
0.25 INJECTION INTRAMUSCULAR ONCE AS NEEDED
Status: DISCONTINUED | OUTPATIENT
Start: 2021-03-10 | End: 2021-03-10

## 2021-03-10 RX ADMIN — DEXAMETHASONE SODIUM PHOSPHATE 4 MG: 4 MG/ML VIAL (ML) INJECTION at 11:35:00

## 2021-03-10 RX ADMIN — ONDANSETRON 4 MG: 2 INJECTION INTRAMUSCULAR; INTRAVENOUS at 11:35:00

## 2021-03-10 RX ADMIN — EPHEDRINE SULFATE 10 MG: 50 INJECTION, SOLUTION INTRAVENOUS at 11:48:00

## 2021-03-10 RX ADMIN — LIDOCAINE HYDROCHLORIDE 4 ML: 40 SOLUTION TOPICAL at 11:35:00

## 2021-03-10 RX ADMIN — SODIUM CHLORIDE, SODIUM LACTATE, POTASSIUM CHLORIDE, CALCIUM CHLORIDE: 600; 310; 30; 20 INJECTION, SOLUTION INTRAVENOUS at 11:35:00

## 2021-03-10 RX ADMIN — MIDAZOLAM HYDROCHLORIDE 2 MG: 1 INJECTION INTRAMUSCULAR; INTRAVENOUS at 11:35:00

## 2021-03-10 RX ADMIN — LIDOCAINE HYDROCHLORIDE 50 MG: 10 INJECTION, SOLUTION EPIDURAL; INFILTRATION; INTRACAUDAL; PERINEURAL at 11:35:00

## 2021-03-10 NOTE — BRIEF OP NOTE
Pre-Operative Diagnosis: Deviated nasal septum [J34.2]  Hypertrophy of nasal turbinates [J34.3]     Post-Operative Diagnosis: Deviated nasal septum [D65. 2]Hypertrophy of nasal turbinates [J34.3]      Procedure Performed:   Procedure(s):  Septoplasty, Submu

## 2021-03-10 NOTE — ANESTHESIA POSTPROCEDURE EVALUATION
Patient: Norah Jones    Procedure Summary     Date: 03/10/21 Room / Location: [facility] OR 02 / [facility] OR    Anesthesia Start: 9272 Anesthesia Stop: 9729    Procedure: Septoplasty, Submucous Turbinate Reduction (Bilateral Nose) Diagnosis:       Deviated

## 2021-03-10 NOTE — TELEPHONE ENCOUNTER
Rn informed patient that per pharmacy medication [employer] need prior auth due pt is taking temazepam, but patient can pay out of pocket per pharmacy of 25.99 dollars,pt agreed and advised pt to call pharmacy,informed pharmacy [facility] of the plan.

## 2021-03-10 NOTE — INTERVAL H&P NOTE
Pre-op Diagnosis: Deviated nasal septum [J34.2]  Hypertrophy of nasal turbinates [J34.3]    The above referenced H&P was reviewed by Augie Shannon.  Edvin Rueda MD on 3/10/2021, the patient was examined and no significant changes have occurred in the patient's conditi

## 2021-03-10 NOTE — ANESTHESIA PROCEDURE NOTES
Airway  Date/Time: 3/10/2021 11:37 AM  Urgency: Elective    Airway not difficult    General Information and Staff    Patient location during procedure: OR  Anesthesiologist: Estela Beatty MD  Performed: anesthesiologist     Indications and Patient Conditi

## 2021-03-10 NOTE — H&P
About 10 years ago he was diagnosed with sleep apnea with a sleep study. Try to use CPAP but was not able to tolerate it. Is not able to breathe well at all through his nose.   He tried nasal steroids and antihistamines without any improvement in his symp Quit date: 1/23/2011        Years since quitting: 10.0      Smokeless tobacco: Never Used    Alcohol use: Yes      Frequency: 2-4 times a month      Drinks per session: 3 or 4      Binge frequency: Never      Comment: rarely    Drug use: Yes      Types: alternatives, implications discussed. He understands and would like to proceed. We may need to consider repeat sleep study after his surgery is completed        The patient indicates understanding of these issues and agrees to the plan.        Francisco HUFF

## 2021-03-10 NOTE — ANESTHESIA PREPROCEDURE EVALUATION
Anesthesia PreOp Note    HPI:     Yojana Collazo is a 62year old male who presents for preoperative consultation requested by: Glory Weems MD    Date of Surgery: 3/10/2021    Procedure(s):  Septoplasty, Submucous Turbinate Reduction  Indication: Deviate History:   Diagnosis Date   • Anxiety state    • Chronic kidney disease, stage 3    • COPD (chronic obstructive pulmonary disease) (HCC)    • Depression    • High blood pressure    • High cholesterol    • HIV disease ([facility] [address]    • Renal disorder    • Sleep ap by mouth daily. , Disp: , Rfl: 3, 3/8/2021  PREZISTA 600 MG Oral Tab, Take 600 mg by mouth 2 (two) times daily. , Disp: , Rfl: 0, 3/10/2021 at 0915  zidovudine 300 MG Oral Tab, Take 300 mg by mouth 2 (two) times daily. , Disp: , Rfl: 6, 3/10/2021 at 1501 hospitals   Lack of Transportation (Non-Medical):   Physical Activity:       Days of Exercise per Week:       Minutes of Exercise per Session:   Stress:       Feeling of Stress :   Social Connections:       Frequency of Communication with Friends and Family:       F legal guardian or family member of the nature of the anesthetic plan, benefits, risks including possible dental damage if relevant, major complications, and any alternative forms of anesthetic management.    All of the patient's questions were answered to t

## 2021-03-11 ENCOUNTER — TELEPHONE (OUTPATIENT)
Dept: OTOLARYNGOLOGY | Facility: CLINIC | Age: 61
End: 2021-03-11

## 2021-03-11 NOTE — OPERATIVE REPORT
[facility]    PATIENT'S NAME: NATALIE MATTHEW   ATTENDING PHYSICIAN: Francisco Matos MD   OPERATING PHYSICIAN: Francisco Matos MD   PATIENT ACCOUNT#:   [id number]    LOCATION:  [address]  MEDICAL RECORD #:   [id number]       DATE OF TOLU taken from the operating room in stable condition. Dictated By Raynette Khan Leocadia Mcburney, MD  d: 03/10/2021 12:24:49  t: 03/10/2021 13:32:51  Hedy Krabbe [id number]  LXF/

## 2021-03-11 NOTE — TELEPHONE ENCOUNTER
•  HYDROcodone-acetaminophen (NORCO) 5-325 MG Oral Tab, 1-2 tablets q 4-6 hours prn pain not to exceed 10 in 24 hours, Disp: 20 tablet, Rfl: 0      Prior Auth received from [employer] plan does not cover this medication/ Please samantha

## 2021-03-12 ENCOUNTER — TELEPHONE (OUTPATIENT)
Dept: OTOLARYNGOLOGY | Facility: CLINIC | Age: 61
End: 2021-03-12

## 2021-03-12 NOTE — TELEPHONE ENCOUNTER
Per pt had surgery on 3/10, asking when can he shower, does he have to keep surgery site dry, and can he remove gauze? Please call thank you.

## 2021-03-20 DIAGNOSIS — Z23 NEED FOR VACCINATION: ICD-10-CM

## 2021-03-22 ENCOUNTER — OFFICE VISIT (OUTPATIENT)
Dept: OTOLARYNGOLOGY | Facility: CLINIC | Age: 61
End: 2021-03-22
Payer: MEDICAID

## 2021-03-22 ENCOUNTER — OFFICE VISIT (OUTPATIENT)
Dept: INTERNAL MEDICINE CLINIC | Facility: CLINIC | Age: 61
End: 2021-03-22
Payer: MEDICAID

## 2021-03-22 VITALS
OXYGEN SATURATION: 99 % | HEIGHT: 68 IN | TEMPERATURE: 99 F | BODY MASS INDEX: 24.71 KG/M2 | WEIGHT: 163 LBS | HEART RATE: 84 BPM | SYSTOLIC BLOOD PRESSURE: 122 MMHG | DIASTOLIC BLOOD PRESSURE: 68 MMHG

## 2021-03-22 VITALS
WEIGHT: 160 LBS | SYSTOLIC BLOOD PRESSURE: 116 MMHG | TEMPERATURE: 98 F | HEIGHT: 68 IN | BODY MASS INDEX: 24.25 KG/M2 | DIASTOLIC BLOOD PRESSURE: 75 MMHG

## 2021-03-22 DIAGNOSIS — J34.2 DEVIATED SEPTUM: Primary | ICD-10-CM

## 2021-03-22 DIAGNOSIS — Z00.00 ANNUAL PHYSICAL EXAM: Primary | ICD-10-CM

## 2021-03-22 PROCEDURE — 93000 ELECTROCARDIOGRAM COMPLETE: CPT | Performed by: INTERNAL MEDICINE

## 2021-03-22 PROCEDURE — 3008F BODY MASS INDEX DOCD: CPT | Performed by: OTOLARYNGOLOGY

## 2021-03-22 PROCEDURE — 3008F BODY MASS INDEX DOCD: CPT | Performed by: INTERNAL MEDICINE

## 2021-03-22 PROCEDURE — 99024 POSTOP FOLLOW-UP VISIT: CPT | Performed by: OTOLARYNGOLOGY

## 2021-03-22 PROCEDURE — 3074F SYST BP LT 130 MM HG: CPT | Performed by: INTERNAL MEDICINE

## 2021-03-22 PROCEDURE — 3078F DIAST BP <80 MM HG: CPT | Performed by: INTERNAL MEDICINE

## 2021-03-22 PROCEDURE — 3074F SYST BP LT 130 MM HG: CPT | Performed by: OTOLARYNGOLOGY

## 2021-03-22 PROCEDURE — 3078F DIAST BP <80 MM HG: CPT | Performed by: OTOLARYNGOLOGY

## 2021-03-22 RX ORDER — FLUTICASONE PROPIONATE 50 MCG
2 SPRAY, SUSPENSION (ML) NASAL DAILY
Qty: 1 BOTTLE | Refills: 5 | Status: SHIPPED | OUTPATIENT
Start: 2021-03-22 | End: 2022-03-22

## 2021-03-22 NOTE — PROGRESS NOTES
Cheko Leonardo is a 62year old male. Patient presents with:  Physical: Reports no new concerns    HPI:   Patient presents with:  Physical: Reports no new concerns    Patient had a visit with me scheduled for this evening.   I was delayed seeing the patient du times daily.      • HYDROcodone-acetaminophen (NORCO) 5-325 MG Oral Tab 1-2 tablets q 4-6 hours prn pain not to exceed 10 in 24 hours (Patient not taking: Reported on 3/22/2021 ) 20 tablet 0      Past Medical History:   Diagnosis Date   • Anxiety state    • patient to perform the exam but called away on an urgent matter concern of the patient. I was delayed in seeing the patient.   When I went to see the patient he decided that he could not wait to see me and that he would leave at this time and reschedule at

## 2021-03-23 NOTE — PROGRESS NOTES
He still very congested following his septoplasty and turbinate reduction. Exam:  Nasal splints removed. Debris cleared from nasal passages bilaterally. Airway patent    Assessment/plan:  Doing very well following his septoplasty reduction.   Breathing

## 2021-04-08 ENCOUNTER — IMMUNIZATION (OUTPATIENT)
Dept: LAB | Facility: HOSPITAL | Age: 61
End: 2021-04-08
Attending: HOSPITALIST
Payer: MEDICAID

## 2021-04-08 DIAGNOSIS — Z23 NEED FOR VACCINATION: Primary | ICD-10-CM

## 2021-04-08 PROCEDURE — 0011A SARSCOV2 VAC 100MCG/0.5ML IM: CPT

## 2021-04-12 ENCOUNTER — OFFICE VISIT (OUTPATIENT)
Dept: INTERNAL MEDICINE CLINIC | Facility: CLINIC | Age: 61
End: 2021-04-12
Payer: MEDICAID

## 2021-04-12 VITALS
BODY MASS INDEX: 24.19 KG/M2 | DIASTOLIC BLOOD PRESSURE: 70 MMHG | HEART RATE: 68 BPM | SYSTOLIC BLOOD PRESSURE: 120 MMHG | OXYGEN SATURATION: 98 % | WEIGHT: 159.63 LBS | HEIGHT: 68 IN | TEMPERATURE: 98 F

## 2021-04-12 DIAGNOSIS — N18.4 ANEMIA DUE TO STAGE 4 CHRONIC KIDNEY DISEASE (HCC): ICD-10-CM

## 2021-04-12 DIAGNOSIS — D63.1 ANEMIA DUE TO STAGE 4 CHRONIC KIDNEY DISEASE (HCC): ICD-10-CM

## 2021-04-12 DIAGNOSIS — B20 HUMAN IMMUNODEFICIENCY VIRUS (HIV) DISEASE (HCC): ICD-10-CM

## 2021-04-12 DIAGNOSIS — R53.83 FATIGUE, UNSPECIFIED TYPE: ICD-10-CM

## 2021-04-12 DIAGNOSIS — G89.29 CHRONIC PAIN OF LEFT KNEE: ICD-10-CM

## 2021-04-12 DIAGNOSIS — M15.9 PRIMARY OSTEOARTHRITIS INVOLVING MULTIPLE JOINTS: ICD-10-CM

## 2021-04-12 DIAGNOSIS — N18.4 STAGE 4 CHRONIC KIDNEY DISEASE (HCC): ICD-10-CM

## 2021-04-12 DIAGNOSIS — E78.1 HYPERTRIGLYCERIDEMIA: ICD-10-CM

## 2021-04-12 DIAGNOSIS — I10 ESSENTIAL HYPERTENSION: Primary | ICD-10-CM

## 2021-04-12 DIAGNOSIS — E78.00 HYPERCHOLESTEROLEMIA: ICD-10-CM

## 2021-04-12 DIAGNOSIS — I35.1 AORTIC VALVE INSUFFICIENCY, ETIOLOGY OF CARDIAC VALVE DISEASE UNSPECIFIED: ICD-10-CM

## 2021-04-12 DIAGNOSIS — M25.562 CHRONIC PAIN OF LEFT KNEE: ICD-10-CM

## 2021-04-12 DIAGNOSIS — R73.01 ABNORMAL FASTING GLUCOSE: ICD-10-CM

## 2021-04-12 DIAGNOSIS — J43.9 PULMONARY EMPHYSEMA, UNSPECIFIED EMPHYSEMA TYPE (HCC): ICD-10-CM

## 2021-04-12 DIAGNOSIS — M17.11 PRIMARY OSTEOARTHRITIS OF RIGHT KNEE: ICD-10-CM

## 2021-04-12 PROCEDURE — 3078F DIAST BP <80 MM HG: CPT | Performed by: INTERNAL MEDICINE

## 2021-04-12 PROCEDURE — 3008F BODY MASS INDEX DOCD: CPT | Performed by: INTERNAL MEDICINE

## 2021-04-12 PROCEDURE — 3074F SYST BP LT 130 MM HG: CPT | Performed by: INTERNAL MEDICINE

## 2021-04-12 PROCEDURE — 99214 OFFICE O/P EST MOD 30 MIN: CPT | Performed by: INTERNAL MEDICINE

## 2021-04-12 NOTE — PATIENT INSTRUCTIONS
1.  Patient is to continue his current diet, medication and activity. 2.  Patient is to get his second Covid vaccine as he is scheduled to do in approximately 3 weeks.   3.  Patient is to continue to follow-up with his nephrologist, Dr. Sebastián Ramirez, and his in

## 2021-04-12 NOTE — PROGRESS NOTES
Kameron Wing is a 62year old male. Patient presents with:  Checkup: 3 week  Hyperlipidemia  Arthritis  Hypertension    HPI:   Patient presents with:  Checkup: 3 week  Hyperlipidemia  Arthritis  Hypertension    Pt feels well.   Patient has recently seen  Soln Inhale 1 puff into the lungs every 6 (six) hours as needed for Wheezing. 1 Inhaler 3   • Losartan Potassium 25 MG Oral Tab Take 1 tablet by mouth daily. 6   • Cholecalciferol 5000 units Oral Tab Take 1 tablet by mouth daily.      • CloNIDine HCl 0.1 M lymphadenopathy or masses, no bruits  CHEST: Well-developed male.   LUNGS: clear to auscultation  CARDIO: RRR, normal S1S2, without murmur   GI:Protuberant, BS are present, no organomegaly or palpable masses  EXTREMITIES: no edema  NEURO: alert and oriented due to stage 4 chronic kidney disease (HCC)  Stable. CPM.  Patient's recent CBC had a hemoglobin 11.3, hematocrit 31.9 and WBC of 5500. We will continue to monitor this in the future. 11. Human immunodeficiency virus (HIV) disease ([facility] [address]  Stable.   CPM.

## 2021-04-18 ENCOUNTER — TELEPHONE (OUTPATIENT)
Dept: INTERNAL MEDICINE CLINIC | Facility: CLINIC | Age: 61
End: 2021-04-18

## 2021-04-19 RX ORDER — SERTRALINE HYDROCHLORIDE 100 MG/1
TABLET, FILM COATED ORAL
Qty: 180 TABLET | Refills: 1 | Status: SHIPPED | OUTPATIENT
Start: 2021-04-19 | End: 2021-12-11

## 2021-04-19 NOTE — TELEPHONE ENCOUNTER
Dr. Shanice Castro, please review refill request for sertraline. There are two sigs listed in Epic. One is for 2 tablets once daily and one is 1 tablet BID. Please advise. Thank you.

## 2021-04-20 ENCOUNTER — TELEPHONE (OUTPATIENT)
Dept: INTERNAL MEDICINE CLINIC | Facility: CLINIC | Age: 61
End: 2021-04-20

## 2021-04-20 DIAGNOSIS — I35.1 AORTIC VALVE INSUFFICIENCY, ETIOLOGY OF CARDIAC VALVE DISEASE UNSPECIFIED: Primary | ICD-10-CM

## 2021-04-20 RX ORDER — TEMAZEPAM 30 MG/1
CAPSULE ORAL
Qty: 30 CAPSULE | Refills: 5 | Status: SHIPPED | OUTPATIENT
Start: 2021-04-20 | End: 2021-10-11

## 2021-04-20 NOTE — TELEPHONE ENCOUNTER
To MD:  The above refill request is for a controlled substance. Please review pended medication order. Print and sign for staff to fax to pharmacy or prescribe electronically.     Last office visit: 4/12/21  Last time refill sent and quantity/refills: 11

## 2021-04-20 NOTE — TELEPHONE ENCOUNTER
Patient is calling to request a referral for Dr Emilia Alberto, New Patient Exam, patient has not scheduled an appointment yet  Please fax referral [phone]

## 2021-04-21 NOTE — TELEPHONE ENCOUNTER
Noted. Please notify pt that I have approved a referral for pt to see Dr Belle Murguia as requested. I will route this to nursing.   Thank you!!

## 2021-04-22 ENCOUNTER — TELEPHONE (OUTPATIENT)
Dept: INTERNAL MEDICINE CLINIC | Facility: CLINIC | Age: 61
End: 2021-04-22

## 2021-04-23 RX ORDER — TRAZODONE HYDROCHLORIDE 50 MG/1
TABLET ORAL
Qty: 60 TABLET | Refills: 5 | Status: SHIPPED | OUTPATIENT
Start: 2021-04-23 | End: 2021-11-16

## 2021-04-27 ENCOUNTER — OFFICE VISIT (OUTPATIENT)
Dept: ORTHOPEDICS CLINIC | Facility: CLINIC | Age: 61
End: 2021-04-27
Payer: MEDICAID

## 2021-04-27 ENCOUNTER — HOSPITAL ENCOUNTER (OUTPATIENT)
Dept: GENERAL RADIOLOGY | Facility: HOSPITAL | Age: 61
Discharge: HOME | End: 2021-04-27
Attending: ORTHOPAEDIC SURGERY
Payer: MEDICAID

## 2021-04-27 VITALS — WEIGHT: 164.38 LBS | HEIGHT: 68 IN | BODY MASS INDEX: 24.91 KG/M2

## 2021-04-27 DIAGNOSIS — M87.051 AVASCULAR NECROSIS OF HIP, RIGHT (HCC): ICD-10-CM

## 2021-04-27 DIAGNOSIS — M25.559 HIP PAIN: ICD-10-CM

## 2021-04-27 DIAGNOSIS — M87.052 AVASCULAR NECROSIS OF BONE OF LEFT HIP (HCC): Primary | ICD-10-CM

## 2021-04-27 PROCEDURE — 73502 X-RAY EXAM HIP UNI 2-3 VIEWS: CPT | Performed by: ORTHOPAEDIC SURGERY

## 2021-04-27 PROCEDURE — 99213 OFFICE O/P EST LOW 20 MIN: CPT | Performed by: ORTHOPAEDIC SURGERY

## 2021-04-27 PROCEDURE — 3008F BODY MASS INDEX DOCD: CPT | Performed by: ORTHOPAEDIC SURGERY

## 2021-04-27 NOTE — PROGRESS NOTES
NURSING INTAKE COMMENTS: Patient presents with:  Hip Pain: c/o left hip pain x \"few year\".  per patient did not start PT d/t pandemic. requesting a new order for PT. rates pain 5/0      HPI: This 62year old male presents today for bilateral hip avascular 100 MG Oral Tab TAKE 2 TABLETS(200 MG) BY MOUTH EVERY  tablet 1   • Fluticasone Propionate 50 MCG/ACT Nasal Suspension 2 sprays by Nasal route daily.  1 Bottle 5   • HYDROcodone-acetaminophen (NORCO) 5-325 MG Oral Tab 1-2 tablets q 4-6 hours prn pain daily      Sexual activity: Not on file       Review of Systems:  GENERAL: feels generally well, no significant weight loss or weight gain  SKIN: no ulcerated or worrisome skin lesions  EYES:denies blurred vision or double vision  HEENT: denies new nasal c distally x2-3 years. No known recent injury. History of fall 6-7 years ago and hernia surgery over 10 years ago. TECHNIQUE: AP pelvis and two views left hip.   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 When he is ready to discuss hip replacement, he can follow in the office. Follow Up: No follow-ups on file.     Concepcion Granados MD

## 2021-04-28 RX ORDER — FLUTICASONE PROPIONATE 110 UG/1
2 AEROSOL, METERED RESPIRATORY (INHALATION) 2 TIMES DAILY
COMMUNITY
Start: 2016-03-09

## 2021-04-28 RX ORDER — HYDROCODONE BITARTRATE AND ACETAMINOPHEN 5; 325 MG/1; MG/1
TABLET ORAL
COMMUNITY
Start: 2021-03-10

## 2021-04-28 RX ORDER — AMLODIPINE BESYLATE 5 MG/1
5 TABLET ORAL DAILY
COMMUNITY
Start: 2020-07-01

## 2021-04-28 RX ORDER — ALBUTEROL SULFATE 90 UG/1
1 AEROSOL, METERED RESPIRATORY (INHALATION) EVERY 6 HOURS PRN
COMMUNITY
Start: 2016-03-09

## 2021-04-28 RX ORDER — FOLIC ACID 1 MG/1
1 TABLET ORAL DAILY
COMMUNITY
Start: 2020-07-20

## 2021-04-28 RX ORDER — SERTRALINE HYDROCHLORIDE 100 MG/1
2 TABLET, FILM COATED ORAL DAILY
COMMUNITY
Start: 2021-04-19

## 2021-04-28 RX ORDER — LAMIVUDINE 150 MG/1
150 TABLET, FILM COATED ORAL 2 TIMES DAILY
COMMUNITY

## 2021-04-28 RX ORDER — TRAZODONE HYDROCHLORIDE 50 MG/1
1 TABLET ORAL 2 TIMES DAILY
COMMUNITY
Start: 2021-04-23

## 2021-04-28 RX ORDER — LOSARTAN POTASSIUM 25 MG/1
1 TABLET ORAL DAILY
COMMUNITY
Start: 2019-10-15

## 2021-04-28 RX ORDER — FLUTICASONE PROPIONATE 50 MCG
2 SPRAY, SUSPENSION (ML) NASAL DAILY
COMMUNITY
Start: 2021-03-22 | End: 2022-03-22

## 2021-04-28 RX ORDER — TEMAZEPAM 30 MG/1
1 CAPSULE ORAL DAILY
COMMUNITY
Start: 2021-04-20

## 2021-04-28 RX ORDER — ALLOPURINOL 100 MG/1
100 TABLET ORAL DAILY
COMMUNITY
Start: 2012-06-15

## 2021-04-28 RX ORDER — ATORVASTATIN CALCIUM 10 MG/1
1 TABLET, FILM COATED ORAL NIGHTLY
COMMUNITY
Start: 2020-11-11

## 2021-04-28 RX ORDER — CLONIDINE HYDROCHLORIDE 0.1 MG/1
0.1 TABLET ORAL 2 TIMES DAILY
COMMUNITY
Start: 2017-12-31

## 2021-04-28 RX ORDER — ZIDOVUDINE 300 MG/1
300 TABLET ORAL 2 TIMES DAILY
COMMUNITY
Start: 2017-12-31

## 2021-04-30 NOTE — TELEPHONE ENCOUNTER
Shyann to patient: To: Larene Hodgkins      From: Bessy Bermeo CMA      Created: 4/30/2021 10:02 AM        Aba Siddiqui has asked us to notify you that he has signed a referral for you to see Dr Vidhi Lin as requested.   Please call us

## 2021-05-07 ENCOUNTER — IMMUNIZATION (OUTPATIENT)
Dept: LAB | Facility: HOSPITAL | Age: 61
End: 2021-05-07
Attending: EMERGENCY MEDICINE
Payer: MEDICAID

## 2021-05-07 DIAGNOSIS — Z23 NEED FOR VACCINATION: Primary | ICD-10-CM

## 2021-05-07 PROCEDURE — 0012A SARSCOV2 VAC 100MCG/0.5ML IM: CPT

## 2021-05-10 ENCOUNTER — PATIENT MESSAGE (OUTPATIENT)
Dept: ORTHOPEDICS CLINIC | Facility: CLINIC | Age: 61
End: 2021-05-10

## 2021-05-10 ENCOUNTER — APPOINTMENT (OUTPATIENT)
Dept: CARDIOLOGY | Age: 61
End: 2021-05-10

## 2021-05-10 DIAGNOSIS — Z96.641 HISTORY OF TOTAL RIGHT HIP REPLACEMENT: Primary | ICD-10-CM

## 2021-05-11 ENCOUNTER — OFFICE VISIT (OUTPATIENT)
Dept: ORTHOPEDICS CLINIC | Facility: CLINIC | Age: 61
End: 2021-05-11
Payer: MEDICAID

## 2021-05-11 DIAGNOSIS — M87.052 AVASCULAR NECROSIS OF BONE OF LEFT HIP (HCC): Primary | ICD-10-CM

## 2021-05-11 PROCEDURE — 99213 OFFICE O/P EST LOW 20 MIN: CPT | Performed by: ORTHOPAEDIC SURGERY

## 2021-05-11 NOTE — PROGRESS NOTES
NURSING INTAKE COMMENTS: Patient presents with:  Hip Pain: Left f/u - he would like to discuss hip replacement - rates pain as 4-10/10at all the time depending on activity, weather, and position , has numbness and tingling in the leg and the foot       HPI ATORVASTATIN 10 MG Oral Tab TAKE 1 TABLET(10 MG) BY MOUTH EVERY NIGHT 90 tablet 3   • FOLIC ACID 1 MG Oral Tab TAKE 1 TABLET(1 MG) BY MOUTH DAILY 90 tablet 3   • amLODIPine Besylate 5 MG Oral Tab Take 5 mg by mouth daily.      • Albuterol Sulfate  (9 shortness of breath  CARDIOVASCULAR: denies chest pain  GI: no hematemesis, no worsening heartburn, no diarrhea  : no dysuria, no blood in urine, no difficulty urinating, no incontinence  MUSCULOSKELETAL: no other musculoskeletal complaints other than in DJD in the right and left hip. EFFUSION: None visible. OTHER: Postop changes of ventral abdominal wall hernia repair. CONCLUSION:  1. Avascular necrosis of the right and left femoral heads, more pronounced in the right femoral head.   There has been dental clearance. He has a cardiology appointment 6/10/2021. Will need cardiology clearance, nephrology clearance, and medical clearance from Dr. Conrado Cardona. When we obtain these, we can schedule the left hip replacement.     Follow Up: No follow-ups on f

## 2021-05-14 ENCOUNTER — TELEPHONE (OUTPATIENT)
Dept: ORTHOPEDICS CLINIC | Facility: CLINIC | Age: 61
End: 2021-05-14

## 2021-05-17 ENCOUNTER — TELEPHONE (OUTPATIENT)
Dept: INTERNAL MEDICINE CLINIC | Facility: CLINIC | Age: 61
End: 2021-05-17

## 2021-05-17 NOTE — TELEPHONE ENCOUNTER
Called patient and explained that DR. ALFARO will order everything  on 5/21 when he has his pre -op visit- verbalized understanding

## 2021-05-17 NOTE — TELEPHONE ENCOUNTER
Pt contacted, appt scheduled for 5/21/21    Please call pt  Does he need labs and chest xray prior to appt    Tasked to nursing

## 2021-05-17 NOTE — TELEPHONE ENCOUNTER
Patient is calling for a pre surgery work up     Surgery date of 6/18/2021 for hip replacement    Pt states he needs labs, ekg and chest xray    Please call and advise

## 2021-05-17 NOTE — TELEPHONE ENCOUNTER
To  - patient needs pre=op clearance with DR. ALFARO - please call and schedule - needs to bring all forms from surgeon thanks

## 2021-05-21 ENCOUNTER — OFFICE VISIT (OUTPATIENT)
Dept: INTERNAL MEDICINE CLINIC | Facility: CLINIC | Age: 61
End: 2021-05-21
Payer: MEDICAID

## 2021-05-21 VITALS
HEIGHT: 68 IN | BODY MASS INDEX: 24.15 KG/M2 | HEART RATE: 72 BPM | DIASTOLIC BLOOD PRESSURE: 76 MMHG | WEIGHT: 159.38 LBS | SYSTOLIC BLOOD PRESSURE: 140 MMHG | TEMPERATURE: 98 F | OXYGEN SATURATION: 98 %

## 2021-05-21 DIAGNOSIS — M87.051 AVASCULAR NECROSIS OF BONES OF BOTH HIPS (HCC): ICD-10-CM

## 2021-05-21 DIAGNOSIS — I10 ESSENTIAL HYPERTENSION: ICD-10-CM

## 2021-05-21 DIAGNOSIS — Z01.818 PRE-OP EXAM: ICD-10-CM

## 2021-05-21 DIAGNOSIS — R53.83 FATIGUE, UNSPECIFIED TYPE: ICD-10-CM

## 2021-05-21 DIAGNOSIS — B20 HUMAN IMMUNODEFICIENCY VIRUS (HIV) DISEASE (HCC): ICD-10-CM

## 2021-05-21 DIAGNOSIS — Z01.818 PREOPERATIVE GENERAL PHYSICAL EXAMINATION: Primary | ICD-10-CM

## 2021-05-21 DIAGNOSIS — M15.9 PRIMARY OSTEOARTHRITIS INVOLVING MULTIPLE JOINTS: ICD-10-CM

## 2021-05-21 DIAGNOSIS — D63.1 ANEMIA DUE TO STAGE 4 CHRONIC KIDNEY DISEASE (HCC): ICD-10-CM

## 2021-05-21 DIAGNOSIS — R73.01 ABNORMAL FASTING GLUCOSE: ICD-10-CM

## 2021-05-21 DIAGNOSIS — E78.1 HYPERTRIGLYCERIDEMIA: ICD-10-CM

## 2021-05-21 DIAGNOSIS — I35.1 AORTIC VALVE INSUFFICIENCY, ETIOLOGY OF CARDIAC VALVE DISEASE UNSPECIFIED: ICD-10-CM

## 2021-05-21 DIAGNOSIS — M87.052 AVASCULAR NECROSIS OF BONES OF BOTH HIPS (HCC): ICD-10-CM

## 2021-05-21 DIAGNOSIS — J43.9 PULMONARY EMPHYSEMA, UNSPECIFIED EMPHYSEMA TYPE (HCC): ICD-10-CM

## 2021-05-21 DIAGNOSIS — E78.00 HYPERCHOLESTEROLEMIA: ICD-10-CM

## 2021-05-21 DIAGNOSIS — N18.4 STAGE 4 CHRONIC KIDNEY DISEASE (HCC): ICD-10-CM

## 2021-05-21 DIAGNOSIS — N18.4 ANEMIA DUE TO STAGE 4 CHRONIC KIDNEY DISEASE (HCC): ICD-10-CM

## 2021-05-21 PROCEDURE — 3078F DIAST BP <80 MM HG: CPT | Performed by: INTERNAL MEDICINE

## 2021-05-21 PROCEDURE — 99215 OFFICE O/P EST HI 40 MIN: CPT | Performed by: INTERNAL MEDICINE

## 2021-05-21 PROCEDURE — 3008F BODY MASS INDEX DOCD: CPT | Performed by: INTERNAL MEDICINE

## 2021-05-21 PROCEDURE — 3077F SYST BP >= 140 MM HG: CPT | Performed by: INTERNAL MEDICINE

## 2021-05-21 RX ORDER — HYDROCODONE BITARTRATE AND ACETAMINOPHEN 5; 325 MG/1; MG/1
1 TABLET ORAL 2 TIMES DAILY PRN
Qty: 30 TABLET | Refills: 0 | Status: ON HOLD | OUTPATIENT
Start: 2021-05-21 | End: 2021-06-18

## 2021-05-21 NOTE — PATIENT INSTRUCTIONS
1.  Patient is to continue his current diet, medication and activity. 2.  Patient appears stable medically and fibula tolerate proposed surgery. 3.  Patient will require nephrology clearance from his nephrologist, Dr Wayne Martino.   4.  Patient will require ca

## 2021-05-21 NOTE — PROGRESS NOTES
Lilibeth Diego is a 62year old male who presents for a preoperative history and physical examination and medical clearance examination. HPI:   Mr. Arthur Edward.   Tito Newman is a 23-year-old black male who was seen by me on May 21, 2021 for a preoperative history and MOUTH TWICE DAILY 60 tablet 5   • TEMAZEPAM 30 MG Oral Cap TAKE 1 CAPSULE(30 MG) BY MOUTH DAILY 30 capsule 5   • SERTRALINE  MG Oral Tab TAKE 2 TABLETS(200 MG) BY MOUTH EVERY  tablet 1   • Fluticasone Propionate 50 MCG/ACT Nasal Suspension 2 Mother    • Cancer Maternal Grandmother         pancreatic   • Heart Attack Maternal Grandfather       Social History:  Social History    Tobacco Use      Smoking status: Former Smoker        Packs/day: 0.50        Years: 20.00        Pack years: 10 examination  Patient appears to be stable medically. Patient should be able tolerate surgery from a medical perspective. Patient will require nephrology clearance from his nephrologist, Dr. Ang Trujillo.   Patient will also require cardiology clearance from  function shows him to have stage IV chronic kidney disease. Patient follows up with Yaneth Quevedo, his nephrologist in the [address] [place] [address]. 11. Anemia due to stage 4 chronic kidney disease (HCC)  Stable.   CPM.    12. Human immunodeficiency virus (HIV) disea

## 2021-05-24 ENCOUNTER — TELEPHONE (OUTPATIENT)
Dept: INTERNAL MEDICINE CLINIC | Facility: CLINIC | Age: 61
End: 2021-05-24

## 2021-05-24 NOTE — TELEPHONE ENCOUNTER
Prior Authorization needed for     Hydrocodone/Acetaminophen 5-325 TB    Please call plan at [phone]  Patient ID# [id number]    Fax in purple folder

## 2021-05-25 ENCOUNTER — TELEPHONE (OUTPATIENT)
Dept: ORTHOPEDICS CLINIC | Facility: CLINIC | Age: 61
End: 2021-05-25

## 2021-05-25 NOTE — TELEPHONE ENCOUNTER
Pt calling for Justin Villegas - she is faxing forms for pt for surgical clearance -   nephrologist Dr Naill Brown fax - [phone]    Dr Gaudencio Batista - fax [phone]

## 2021-05-26 ENCOUNTER — MED REC SCAN ONLY (OUTPATIENT)
Dept: INTERNAL MEDICINE CLINIC | Facility: CLINIC | Age: 61
End: 2021-05-26

## 2021-05-26 ENCOUNTER — LAB ENCOUNTER (OUTPATIENT)
Dept: LAB | Facility: HOSPITAL | Age: 61
End: 2021-05-26
Attending: INTERNAL MEDICINE
Payer: MEDICAID

## 2021-05-26 ENCOUNTER — HOSPITAL ENCOUNTER (OUTPATIENT)
Dept: GENERAL RADIOLOGY | Facility: HOSPITAL | Age: 61
Discharge: HOME | End: 2021-05-26
Attending: INTERNAL MEDICINE
Payer: MEDICAID

## 2021-05-26 ENCOUNTER — TELEPHONE (OUTPATIENT)
Dept: ORTHOPEDICS CLINIC | Facility: CLINIC | Age: 61
End: 2021-05-26

## 2021-05-26 DIAGNOSIS — Z01.818 PREOPERATIVE GENERAL PHYSICAL EXAMINATION: ICD-10-CM

## 2021-05-26 DIAGNOSIS — Z01.818 PRE-OP EXAM: ICD-10-CM

## 2021-05-26 DIAGNOSIS — M87.052 AVASCULAR NECROSIS OF BONE OF LEFT HIP (HCC): Primary | ICD-10-CM

## 2021-05-26 DIAGNOSIS — I35.1 AORTIC VALVE INSUFFICIENCY, ETIOLOGY OF CARDIAC VALVE DISEASE UNSPECIFIED: ICD-10-CM

## 2021-05-26 LAB
ALBUMIN SERPL-MCNC: 3.5 G/DL
ALBUMIN/GLOB SERPL: 0.7 {RATIO}
ALP SERPL-CCNC: 96 U/L
ALT SERPL-CCNC: 20 UNITS/L
ANION GAP SERPL CALC-SCNC: 5 MMOL/L
AST SERPL-CCNC: 20 UNITS/L
BILIRUB SERPL-MCNC: 0.3 MG/DL
BUN SERPL-MCNC: 43 MG/DL
BUN/CREAT SERPL: 14.6
CALCIUM SERPL-MCNC: 9.3 MG/DL
CHLORIDE SERPL-SCNC: 117 MMOL/L
CHOLEST SERPL-MCNC: 185 MG/DL
CO2 SERPL-SCNC: 22 MMOL/L
CREAT SERPL-MCNC: 2.95 MG/DL
ERYTHROCYTE [DISTWIDTH] IN BLOOD BY AUTOMATED COUNT: 62 %
ERYTHROCYTE [DISTWIDTH] IN BLOOD: 14.6 %
ESTIMATED AVERAGE GLUCOSE: 108
GLOBULIN SER-MCNC: 4.8 G/DL
GLUCOSE SERPL-MCNC: 117 MG/DL
HBA1C MFR BLD: 5.4 %
HCT VFR BLD CALC: 34.4 %
HDLC SERPL-MCNC: 36 MG/DL
HGB BLD-MCNC: 11.3 G/DL
LDLC SERPL CALC-MCNC: 97 MG/DL
LENGTH OF FAST TIME PATIENT: YES H
MCH RBC QN AUTO: 37.7 PG
MCHC RBC AUTO-ENTMCNC: 32.8 G/DL
MCV RBC AUTO: 114.7 FL
NONHDLC SERPL-MCNC: 149 MG/DL
PLATELET # BLD: 209 K/MCL
POTASSIUM SERPL-SCNC: 4.2 MMOL/L
PROT SERPL-MCNC: 8.3 G/DL
RBC # BLD: 3 10*6/UL
SODIUM SERPL-SCNC: 144 MMOL/L
TRIGL SERPL-MCNC: 262 MG/DL
TSH SERPL-ACNC: 1.24 MCUNITS/ML
VLDLC SERPL CALC-MCNC: 52 MG/DL
WBC # BLD: 5.7 K/MCL

## 2021-05-26 PROCEDURE — 85610 PROTHROMBIN TIME: CPT | Performed by: INTERNAL MEDICINE

## 2021-05-26 PROCEDURE — 83036 HEMOGLOBIN GLYCOSYLATED A1C: CPT | Performed by: INTERNAL MEDICINE

## 2021-05-26 PROCEDURE — 84443 ASSAY THYROID STIM HORMONE: CPT | Performed by: INTERNAL MEDICINE

## 2021-05-26 PROCEDURE — 85025 COMPLETE CBC W/AUTO DIFF WBC: CPT | Performed by: INTERNAL MEDICINE

## 2021-05-26 PROCEDURE — 36415 COLL VENOUS BLD VENIPUNCTURE: CPT | Performed by: INTERNAL MEDICINE

## 2021-05-26 PROCEDURE — 80061 LIPID PANEL: CPT | Performed by: INTERNAL MEDICINE

## 2021-05-26 PROCEDURE — 85060 BLOOD SMEAR INTERPRETATION: CPT | Performed by: INTERNAL MEDICINE

## 2021-05-26 PROCEDURE — 71046 X-RAY EXAM CHEST 2 VIEWS: CPT | Performed by: INTERNAL MEDICINE

## 2021-05-26 PROCEDURE — 85730 THROMBOPLASTIN TIME PARTIAL: CPT | Performed by: INTERNAL MEDICINE

## 2021-05-26 PROCEDURE — 81001 URINALYSIS AUTO W/SCOPE: CPT | Performed by: INTERNAL MEDICINE

## 2021-05-26 PROCEDURE — 80053 COMPREHEN METABOLIC PANEL: CPT | Performed by: INTERNAL MEDICINE

## 2021-05-26 NOTE — TELEPHONE ENCOUNTER
Type of surgery: Left total hip arthroplasty   Date: 6/18/21  Location: [facility]  Medical Clearance:  yes     *Medical: yes      *Dental: No      *Other: Nephrology and Cardiology   Prior Authorization Status: Pending  Workers Comp:  Medacta/Kyle: Kaylene rodriguez

## 2021-06-03 ENCOUNTER — TELEPHONE (OUTPATIENT)
Dept: INTERNAL MEDICINE CLINIC | Facility: CLINIC | Age: 61
End: 2021-06-03

## 2021-06-03 NOTE — TELEPHONE ENCOUNTER
Per Yvonne Isidro, Pt Lab work and Pre op paperwork faxed to [email or web address] at [phone] with fax confirmation.

## 2021-06-07 ENCOUNTER — OFFICE VISIT (OUTPATIENT)
Dept: CARDIOLOGY | Age: 61
End: 2021-06-07

## 2021-06-07 VITALS
HEART RATE: 67 BPM | HEIGHT: 69 IN | DIASTOLIC BLOOD PRESSURE: 70 MMHG | WEIGHT: 156 LBS | BODY MASS INDEX: 23.11 KG/M2 | OXYGEN SATURATION: 98 % | SYSTOLIC BLOOD PRESSURE: 116 MMHG

## 2021-06-07 DIAGNOSIS — I10 ESSENTIAL HYPERTENSION: ICD-10-CM

## 2021-06-07 DIAGNOSIS — R94.31 ABNORMAL ELECTROCARDIOGRAM (ECG) (EKG): ICD-10-CM

## 2021-06-07 DIAGNOSIS — E78.1 HYPERTRIGLYCERIDEMIA: ICD-10-CM

## 2021-06-07 DIAGNOSIS — I35.9 AORTIC VALVE DISORDER: Primary | ICD-10-CM

## 2021-06-07 PROBLEM — R01.1 MURMUR: Status: ACTIVE | Noted: 2021-06-07

## 2021-06-07 PROCEDURE — 3074F SYST BP LT 130 MM HG: CPT | Performed by: INTERNAL MEDICINE

## 2021-06-07 PROCEDURE — 3078F DIAST BP <80 MM HG: CPT | Performed by: INTERNAL MEDICINE

## 2021-06-07 PROCEDURE — 99244 OFF/OP CNSLTJ NEW/EST MOD 40: CPT | Performed by: INTERNAL MEDICINE

## 2021-06-07 RX ORDER — DOXYCYCLINE HYCLATE 100 MG/1
1 CAPSULE ORAL 2 TIMES DAILY
COMMUNITY
Start: 2021-06-03

## 2021-06-07 ASSESSMENT — PATIENT HEALTH QUESTIONNAIRE - PHQ9
SUM OF ALL RESPONSES TO PHQ9 QUESTIONS 1 AND 2: 0
1. LITTLE INTEREST OR PLEASURE IN DOING THINGS: NOT AT ALL
SUM OF ALL RESPONSES TO PHQ9 QUESTIONS 1 AND 2: 0
CLINICAL INTERPRETATION OF PHQ9 SCORE: NO FURTHER SCREENING NEEDED
CLINICAL INTERPRETATION OF PHQ2 SCORE: NO FURTHER SCREENING NEEDED
2. FEELING DOWN, DEPRESSED OR HOPELESS: NOT AT ALL

## 2021-06-08 DIAGNOSIS — R94.31 ABNORMAL ELECTROCARDIOGRAM (ECG) (EKG): ICD-10-CM

## 2021-06-08 DIAGNOSIS — I10 ESSENTIAL HYPERTENSION: ICD-10-CM

## 2021-06-08 DIAGNOSIS — I35.9 AORTIC VALVE DISORDER: ICD-10-CM

## 2021-06-08 DIAGNOSIS — E78.1 HYPERTRIGLYCERIDEMIA: ICD-10-CM

## 2021-06-08 PROCEDURE — 93000 ELECTROCARDIOGRAM COMPLETE: CPT | Performed by: INTERNAL MEDICINE

## 2021-06-09 ENCOUNTER — TELEPHONE (OUTPATIENT)
Dept: CARDIOLOGY | Age: 61
End: 2021-06-09

## 2021-06-16 ENCOUNTER — HOSPITAL ENCOUNTER (OUTPATIENT)
Dept: NUCLEAR MEDICINE | Facility: HOSPITAL | Age: 61
Discharge: HOME | End: 2021-06-16
Attending: INTERNAL MEDICINE
Payer: MEDICAID

## 2021-06-16 ENCOUNTER — LAB ENCOUNTER (OUTPATIENT)
Dept: LAB | Facility: HOSPITAL | Age: 61
End: 2021-06-16
Attending: ORTHOPAEDIC SURGERY
Payer: MEDICAID

## 2021-06-16 ENCOUNTER — HOSPITAL ENCOUNTER (OUTPATIENT)
Dept: CV DIAGNOSTICS | Facility: HOSPITAL | Age: 61
Discharge: HOME | End: 2021-06-16
Attending: INTERNAL MEDICINE
Payer: MEDICAID

## 2021-06-16 DIAGNOSIS — E78.1 HYPERGLYCERIDEMIA: ICD-10-CM

## 2021-06-16 DIAGNOSIS — I35.9 AORTIC VALVE DISORDER: ICD-10-CM

## 2021-06-16 DIAGNOSIS — I10 ESSENTIAL HYPERTENSION: ICD-10-CM

## 2021-06-16 DIAGNOSIS — R94.31 ABNORMAL ELECTROCARDIOGRAM (ECG) (EKG): ICD-10-CM

## 2021-06-16 DIAGNOSIS — Z01.818 PREOP TESTING: ICD-10-CM

## 2021-06-16 PROCEDURE — 86900 BLOOD TYPING SEROLOGIC ABO: CPT

## 2021-06-16 PROCEDURE — 87641 MR-STAPH DNA AMP PROBE: CPT

## 2021-06-16 PROCEDURE — 86901 BLOOD TYPING SEROLOGIC RH(D): CPT

## 2021-06-16 PROCEDURE — 93017 CV STRESS TEST TRACING ONLY: CPT | Performed by: INTERNAL MEDICINE

## 2021-06-16 PROCEDURE — 36415 COLL VENOUS BLD VENIPUNCTURE: CPT

## 2021-06-16 PROCEDURE — 93018 CV STRESS TEST I&R ONLY: CPT | Performed by: INTERNAL MEDICINE

## 2021-06-16 PROCEDURE — 93016 CV STRESS TEST SUPVJ ONLY: CPT | Performed by: INTERNAL MEDICINE

## 2021-06-16 PROCEDURE — 86850 RBC ANTIBODY SCREEN: CPT

## 2021-06-16 PROCEDURE — 78452 HT MUSCLE IMAGE SPECT MULT: CPT | Performed by: INTERNAL MEDICINE

## 2021-06-16 NOTE — H&P
NURSING INTAKE COMMENTS: No chief complaint on file. HPI: This 62year old male presents today to discuss left hip replacement. He has bilateral hip avascular necrosis. The right looks worse on x-ray but the left is hurting more.   He lives on the fo 1 TABLET(1 MG) BY MOUTH DAILY 90 tablet 3   • amLODIPine Besylate 5 MG Oral Tab Take 5 mg by mouth daily. • Albuterol Sulfate  (90 Base) MCG/ACT Inhalation Aero Soln Inhale 1 puff into the lungs every 6 (six) hours as needed for Wheezing.  [address] [place] denies shortness of breath  CARDIOVASCULAR: denies chest pain  GI: no hematemesis, no worsening heartburn, no diarrhea  : no dysuria, no blood in urine, no difficulty urinating, no incontinence  MUSCULOSKELETAL: no other musculoskeletal complaints other swelling. There is mild DJD in the right and left hip. EFFUSION: None visible. OTHER: Postop changes of ventral abdominal wall hernia repair. CONCLUSION:  1.  Avascular necrosis of the right and left femoral heads, more pronounced in the right femo at increased risk for infection due to his kidney function. I told him I was not sure if the HIV drugs count as immune suppression as well. In any event, I told him he is a bit between a rock and a hard place in terms of decision-making.   His ambulatory

## 2021-06-17 ENCOUNTER — TELEPHONE (OUTPATIENT)
Dept: CARDIOLOGY | Age: 61
End: 2021-06-17

## 2021-06-17 NOTE — PAT NURSING NOTE
S/w Jyace Lubin from Dr Richards [facility] ([phone])  and f/u cardiac clearance. Pt had stress test done on 6/16/21. She will f/u with MD and will fax to [facility].

## 2021-06-18 ENCOUNTER — APPOINTMENT (OUTPATIENT)
Dept: GENERAL RADIOLOGY | Facility: HOSPITAL | Age: 61
DRG: 470 | End: 2021-06-18
Attending: ORTHOPAEDIC SURGERY
Payer: MEDICAID

## 2021-06-18 ENCOUNTER — HOSPITAL ENCOUNTER (INPATIENT)
Facility: HOSPITAL | Age: 61
LOS: 2 days | Discharge: HOME HEALTH | DRG: 470 | End: 2021-06-20
Attending: ORTHOPAEDIC SURGERY | Admitting: ORTHOPAEDIC SURGERY
Payer: MEDICAID

## 2021-06-18 ENCOUNTER — ANESTHESIA EVENT (OUTPATIENT)
Dept: SURGERY | Facility: HOSPITAL | Age: 61
DRG: 470 | End: 2021-06-18
Payer: MEDICAID

## 2021-06-18 ENCOUNTER — ANESTHESIA (OUTPATIENT)
Dept: SURGERY | Facility: HOSPITAL | Age: 61
DRG: 470 | End: 2021-06-18
Payer: MEDICAID

## 2021-06-18 DIAGNOSIS — Z01.818 PREOP TESTING: Primary | ICD-10-CM

## 2021-06-18 DIAGNOSIS — M87.052 AVASCULAR NECROSIS OF BONE OF LEFT HIP (HCC): ICD-10-CM

## 2021-06-18 PROBLEM — G47.33 OSA (OBSTRUCTIVE SLEEP APNEA): Chronic | Status: ACTIVE | Noted: 2021-06-18

## 2021-06-18 PROBLEM — M87.9 OSTEONECROSIS OF LEFT HIP (HCC): Status: ACTIVE | Noted: 2021-06-18

## 2021-06-18 PROCEDURE — 3074F SYST BP LT 130 MM HG: CPT | Performed by: HOSPITALIST

## 2021-06-18 PROCEDURE — 99232 SBSQ HOSP IP/OBS MODERATE 35: CPT | Performed by: HOSPITALIST

## 2021-06-18 PROCEDURE — 3008F BODY MASS INDEX DOCD: CPT | Performed by: HOSPITALIST

## 2021-06-18 PROCEDURE — 0SRB04A REPLACEMENT OF LEFT HIP JOINT WITH CERAMIC ON POLYETHYLENE SYNTHETIC SUBSTITUTE, UNCEMENTED, OPEN APPROACH: ICD-10-PCS | Performed by: ORTHOPAEDIC SURGERY

## 2021-06-18 PROCEDURE — 73502 X-RAY EXAM HIP UNI 2-3 VIEWS: CPT | Performed by: ORTHOPAEDIC SURGERY

## 2021-06-18 PROCEDURE — 3078F DIAST BP <80 MM HG: CPT | Performed by: HOSPITALIST

## 2021-06-18 PROCEDURE — 76000 FLUOROSCOPY <1 HR PHYS/QHP: CPT | Performed by: ORTHOPAEDIC SURGERY

## 2021-06-18 DEVICE — TOTAL HIP W/CER HEAD: Type: IMPLANTABLE DEVICE | Status: FUNCTIONAL

## 2021-06-18 DEVICE — DOUBLE MOBILITY ACETABULAR SHELL Ø50
Type: IMPLANTABLE DEVICE | Site: HIP | Status: FUNCTIONAL
Brand: MPACT DOUBLE MOBILITY SHELLS

## 2021-06-18 DEVICE — MASTERLOC CEMENTLESS TI COATED LAT STEM # 5
Type: IMPLANTABLE DEVICE | Site: HIP | Status: FUNCTIONAL
Brand: MASTERLOC FEMORAL STEMS

## 2021-06-18 DEVICE — HC PE LINER 28 / DME
Type: IMPLANTABLE DEVICE | Site: HIP | Status: FUNCTIONAL
Brand: DOUBLE MOBILITY LINER

## 2021-06-18 DEVICE — DUAL MOBILITY UPCHARGE: Type: IMPLANTABLE DEVICE | Status: FUNCTIONAL

## 2021-06-18 DEVICE — FEMORAL HEAD Ø 28 SIZE M
Type: IMPLANTABLE DEVICE | Site: HIP | Status: FUNCTIONAL
Brand: MECTACER BIOLOX DELTA FEMORAL BALL HEAD

## 2021-06-18 RX ORDER — HYDROCODONE BITARTRATE AND ACETAMINOPHEN 10; 325 MG/1; MG/1
1 TABLET ORAL EVERY 4 HOURS PRN
Status: DISCONTINUED | OUTPATIENT
Start: 2021-06-19 | End: 2021-06-20

## 2021-06-18 RX ORDER — HYDROMORPHONE HYDROCHLORIDE 1 MG/ML
0.6 INJECTION, SOLUTION INTRAMUSCULAR; INTRAVENOUS; SUBCUTANEOUS EVERY 5 MIN PRN
Status: DISCONTINUED | OUTPATIENT
Start: 2021-06-18 | End: 2021-06-18 | Stop reason: HOSPADM

## 2021-06-18 RX ORDER — DIPHENHYDRAMINE HCL 25 MG
25 CAPSULE ORAL EVERY 4 HOURS PRN
Status: DISCONTINUED | OUTPATIENT
Start: 2021-06-18 | End: 2021-06-20

## 2021-06-18 RX ORDER — ASPIRIN 325 MG
325 TABLET, DELAYED RELEASE (ENTERIC COATED) ORAL 2 TIMES DAILY
Qty: 60 TABLET | Refills: 0 | Status: SHIPPED | OUTPATIENT
Start: 2021-06-18 | End: 2021-10-24

## 2021-06-18 RX ORDER — POLYETHYLENE GLYCOL 3350 17 G/17G
17 POWDER, FOR SOLUTION ORAL DAILY PRN
Status: DISCONTINUED | OUTPATIENT
Start: 2021-06-18 | End: 2021-06-20

## 2021-06-18 RX ORDER — HYDROCODONE BITARTRATE AND ACETAMINOPHEN 5; 325 MG/1; MG/1
2 TABLET ORAL AS NEEDED
Status: DISCONTINUED | OUTPATIENT
Start: 2021-06-18 | End: 2021-06-18 | Stop reason: HOSPADM

## 2021-06-18 RX ORDER — NALOXONE HYDROCHLORIDE 0.4 MG/ML
0.08 INJECTION, SOLUTION INTRAMUSCULAR; INTRAVENOUS; SUBCUTANEOUS
Status: ACTIVE | OUTPATIENT
Start: 2021-06-18 | End: 2021-06-19

## 2021-06-18 RX ORDER — PROCHLORPERAZINE EDISYLATE 5 MG/ML
5 INJECTION INTRAMUSCULAR; INTRAVENOUS ONCE AS NEEDED
Status: ACTIVE | OUTPATIENT
Start: 2021-06-18 | End: 2021-06-18

## 2021-06-18 RX ORDER — ACETAMINOPHEN 325 MG/1
650 TABLET ORAL EVERY 6 HOURS PRN
Status: DISPENSED | OUTPATIENT
Start: 2021-06-18 | End: 2021-06-19

## 2021-06-18 RX ORDER — HYDROMORPHONE HYDROCHLORIDE 1 MG/ML
0.2 INJECTION, SOLUTION INTRAMUSCULAR; INTRAVENOUS; SUBCUTANEOUS EVERY 5 MIN PRN
Status: DISCONTINUED | OUTPATIENT
Start: 2021-06-18 | End: 2021-06-18 | Stop reason: HOSPADM

## 2021-06-18 RX ORDER — ASPIRIN 325 MG
325 TABLET, DELAYED RELEASE (ENTERIC COATED) ORAL 2 TIMES DAILY
Status: DISCONTINUED | OUTPATIENT
Start: 2021-06-18 | End: 2021-06-18

## 2021-06-18 RX ORDER — HALOPERIDOL 5 MG/ML
0.5 INJECTION INTRAMUSCULAR ONCE AS NEEDED
Status: ACTIVE | OUTPATIENT
Start: 2021-06-18 | End: 2021-06-18

## 2021-06-18 RX ORDER — CLONIDINE HYDROCHLORIDE 0.1 MG/1
0.1 TABLET ORAL 2 TIMES DAILY
Status: DISCONTINUED | OUTPATIENT
Start: 2021-06-18 | End: 2021-06-20

## 2021-06-18 RX ORDER — NALBUPHINE HCL 10 MG/ML
2.5 AMPUL (ML) INJECTION EVERY 4 HOURS PRN
Status: ACTIVE | OUTPATIENT
Start: 2021-06-18 | End: 2021-06-19

## 2021-06-18 RX ORDER — ACETAMINOPHEN 500 MG
1000 TABLET ORAL ONCE
Status: COMPLETED | OUTPATIENT
Start: 2021-06-18 | End: 2021-06-18

## 2021-06-18 RX ORDER — HYDROMORPHONE HYDROCHLORIDE 1 MG/ML
0.4 INJECTION, SOLUTION INTRAMUSCULAR; INTRAVENOUS; SUBCUTANEOUS
Status: ACTIVE | OUTPATIENT
Start: 2021-06-18 | End: 2021-06-19

## 2021-06-18 RX ORDER — SERTRALINE HYDROCHLORIDE 100 MG/1
200 TABLET, FILM COATED ORAL DAILY
Status: DISCONTINUED | OUTPATIENT
Start: 2021-06-18 | End: 2021-06-20

## 2021-06-18 RX ORDER — MORPHINE SULFATE 10 MG/ML
6 INJECTION, SOLUTION INTRAMUSCULAR; INTRAVENOUS EVERY 10 MIN PRN
Status: DISCONTINUED | OUTPATIENT
Start: 2021-06-18 | End: 2021-06-18 | Stop reason: HOSPADM

## 2021-06-18 RX ORDER — DIPHENHYDRAMINE HYDROCHLORIDE 50 MG/ML
12.5 INJECTION INTRAMUSCULAR; INTRAVENOUS EVERY 4 HOURS PRN
Status: ACTIVE | OUTPATIENT
Start: 2021-06-18 | End: 2021-06-19

## 2021-06-18 RX ORDER — ATORVASTATIN CALCIUM 10 MG/1
10 TABLET, FILM COATED ORAL NIGHTLY
Status: DISCONTINUED | OUTPATIENT
Start: 2021-06-18 | End: 2021-06-20

## 2021-06-18 RX ORDER — TRAZODONE HYDROCHLORIDE 50 MG/1
50 TABLET ORAL 2 TIMES DAILY
Status: DISCONTINUED | OUTPATIENT
Start: 2021-06-18 | End: 2021-06-20

## 2021-06-18 RX ORDER — DIPHENHYDRAMINE HYDROCHLORIDE 50 MG/ML
12.5 INJECTION INTRAMUSCULAR; INTRAVENOUS EVERY 4 HOURS PRN
Status: DISCONTINUED | OUTPATIENT
Start: 2021-06-18 | End: 2021-06-20

## 2021-06-18 RX ORDER — DOCUSATE SODIUM 100 MG/1
100 CAPSULE, LIQUID FILLED ORAL 2 TIMES DAILY
Status: DISCONTINUED | OUTPATIENT
Start: 2021-06-18 | End: 2021-06-20

## 2021-06-18 RX ORDER — SENNOSIDES 8.6 MG
17.2 TABLET ORAL NIGHTLY
Status: DISCONTINUED | OUTPATIENT
Start: 2021-06-18 | End: 2021-06-20

## 2021-06-18 RX ORDER — CEFAZOLIN SODIUM/WATER 2 G/20 ML
2 SYRINGE (ML) INTRAVENOUS ONCE
Status: COMPLETED | OUTPATIENT
Start: 2021-06-18 | End: 2021-06-18

## 2021-06-18 RX ORDER — HYDROCODONE BITARTRATE AND ACETAMINOPHEN 5; 325 MG/1; MG/1
1 TABLET ORAL AS NEEDED
Status: DISCONTINUED | OUTPATIENT
Start: 2021-06-18 | End: 2021-06-18 | Stop reason: HOSPADM

## 2021-06-18 RX ORDER — NALOXONE HYDROCHLORIDE 0.4 MG/ML
80 INJECTION, SOLUTION INTRAMUSCULAR; INTRAVENOUS; SUBCUTANEOUS AS NEEDED
Status: DISCONTINUED | OUTPATIENT
Start: 2021-06-18 | End: 2021-06-18 | Stop reason: HOSPADM

## 2021-06-18 RX ORDER — LOSARTAN POTASSIUM 25 MG/1
25 TABLET ORAL DAILY
Status: DISCONTINUED | OUTPATIENT
Start: 2021-06-18 | End: 2021-06-20

## 2021-06-18 RX ORDER — FLUTICASONE PROPIONATE 50 MCG
2 SPRAY, SUSPENSION (ML) NASAL DAILY PRN
Status: DISCONTINUED | OUTPATIENT
Start: 2021-06-18 | End: 2021-06-20

## 2021-06-18 RX ORDER — SODIUM CHLORIDE, SODIUM LACTATE, POTASSIUM CHLORIDE, CALCIUM CHLORIDE 600; 310; 30; 20 MG/100ML; MG/100ML; MG/100ML; MG/100ML
INJECTION, SOLUTION INTRAVENOUS CONTINUOUS
Status: DISCONTINUED | OUTPATIENT
Start: 2021-06-18 | End: 2021-06-18 | Stop reason: HOSPADM

## 2021-06-18 RX ORDER — VANCOMYCIN HYDROCHLORIDE 1 G/20ML
INJECTION, POWDER, LYOPHILIZED, FOR SOLUTION INTRAVENOUS AS NEEDED
Status: DISCONTINUED | OUTPATIENT
Start: 2021-06-18 | End: 2021-06-18 | Stop reason: HOSPADM

## 2021-06-18 RX ORDER — MULTIPLE VITAMINS W/ MINERALS TAB 9MG-400MCG
1 TAB ORAL DAILY
Status: DISCONTINUED | OUTPATIENT
Start: 2021-06-18 | End: 2021-06-20

## 2021-06-18 RX ORDER — ONDANSETRON 2 MG/ML
4 INJECTION INTRAMUSCULAR; INTRAVENOUS ONCE AS NEEDED
Status: ACTIVE | OUTPATIENT
Start: 2021-06-18 | End: 2021-06-18

## 2021-06-18 RX ORDER — AMLODIPINE BESYLATE 5 MG/1
5 TABLET ORAL DAILY
Status: DISCONTINUED | OUTPATIENT
Start: 2021-06-18 | End: 2021-06-20

## 2021-06-18 RX ORDER — MORPHINE SULFATE 4 MG/ML
4 INJECTION, SOLUTION INTRAMUSCULAR; INTRAVENOUS EVERY 10 MIN PRN
Status: DISCONTINUED | OUTPATIENT
Start: 2021-06-18 | End: 2021-06-18 | Stop reason: HOSPADM

## 2021-06-18 RX ORDER — ONDANSETRON 2 MG/ML
INJECTION INTRAMUSCULAR; INTRAVENOUS AS NEEDED
Status: DISCONTINUED | OUTPATIENT
Start: 2021-06-18 | End: 2021-06-18 | Stop reason: SURG

## 2021-06-18 RX ORDER — SODIUM CHLORIDE 9 MG/ML
INJECTION, SOLUTION INTRAVENOUS CONTINUOUS
Status: DISCONTINUED | OUTPATIENT
Start: 2021-06-18 | End: 2021-06-20

## 2021-06-18 RX ORDER — CYANOCOBALAMIN (VITAMIN B-12) 1000 MCG
1 TABLET, EXTENDED RELEASE ORAL 2 TIMES DAILY WITH MEALS
Qty: 60 TABLET | Refills: 0 | Status: ON HOLD | OUTPATIENT
Start: 2021-06-18 | End: 2021-10-22

## 2021-06-18 RX ORDER — HYDROMORPHONE HYDROCHLORIDE 1 MG/ML
0.6 INJECTION, SOLUTION INTRAMUSCULAR; INTRAVENOUS; SUBCUTANEOUS
Status: ACTIVE | OUTPATIENT
Start: 2021-06-18 | End: 2021-06-19

## 2021-06-18 RX ORDER — ACETAMINOPHEN 325 MG/1
650 TABLET ORAL EVERY 8 HOURS PRN
Status: DISCONTINUED | OUTPATIENT
Start: 2021-06-19 | End: 2021-06-20

## 2021-06-18 RX ORDER — PROCHLORPERAZINE EDISYLATE 5 MG/ML
10 INJECTION INTRAMUSCULAR; INTRAVENOUS EVERY 6 HOURS PRN
Status: ACTIVE | OUTPATIENT
Start: 2021-06-18 | End: 2021-06-20

## 2021-06-18 RX ORDER — NAPROXEN 500 MG/1
500 TABLET ORAL 2 TIMES DAILY WITH MEALS
Status: DISCONTINUED | OUTPATIENT
Start: 2021-06-18 | End: 2021-06-18

## 2021-06-18 RX ORDER — HYDROCODONE BITARTRATE AND ACETAMINOPHEN 10; 325 MG/1; MG/1
1 TABLET ORAL EVERY 6 HOURS PRN
Qty: 30 TABLET | Refills: 0 | Status: SHIPPED | OUTPATIENT
Start: 2021-06-18 | End: 2021-10-01

## 2021-06-18 RX ORDER — ENOXAPARIN SODIUM 100 MG/ML
40 INJECTION SUBCUTANEOUS ONCE
Status: DISCONTINUED | OUTPATIENT
Start: 2021-06-18 | End: 2021-06-18

## 2021-06-18 RX ORDER — ONDANSETRON 2 MG/ML
4 INJECTION INTRAMUSCULAR; INTRAVENOUS EVERY 4 HOURS PRN
Status: DISCONTINUED | OUTPATIENT
Start: 2021-06-18 | End: 2021-06-20

## 2021-06-18 RX ORDER — NAPROXEN 500 MG/1
500 TABLET ORAL 2 TIMES DAILY WITH MEALS
Qty: 60 TABLET | Refills: 0 | Status: SHIPPED | OUTPATIENT
Start: 2021-06-18 | End: 2021-10-24

## 2021-06-18 RX ORDER — MORPHINE SULFATE 4 MG/ML
2 INJECTION, SOLUTION INTRAMUSCULAR; INTRAVENOUS EVERY 10 MIN PRN
Status: DISCONTINUED | OUTPATIENT
Start: 2021-06-18 | End: 2021-06-18 | Stop reason: HOSPADM

## 2021-06-18 RX ORDER — MULTIVIT-MIN/FA/LYCOPEN/LUTEIN .4-300-25
1 TABLET ORAL DAILY
Qty: 30 TABLET | Refills: 0 | Status: ON HOLD | OUTPATIENT
Start: 2021-06-18 | End: 2021-10-22

## 2021-06-18 RX ORDER — BISACODYL 10 MG
10 SUPPOSITORY, RECTAL RECTAL
Status: DISCONTINUED | OUTPATIENT
Start: 2021-06-18 | End: 2021-06-20

## 2021-06-18 RX ORDER — PHENYLEPHRINE HCL 10 MG/ML
VIAL (ML) INJECTION AS NEEDED
Status: DISCONTINUED | OUTPATIENT
Start: 2021-06-18 | End: 2021-06-18 | Stop reason: SURG

## 2021-06-18 RX ORDER — LAMIVUDINE 150 MG/1
150 TABLET, FILM COATED ORAL DAILY
Status: DISCONTINUED | OUTPATIENT
Start: 2021-06-18 | End: 2021-06-20

## 2021-06-18 RX ORDER — ZIDOVUDINE 300 MG/1
300 TABLET ORAL 2 TIMES DAILY
Status: DISCONTINUED | OUTPATIENT
Start: 2021-06-18 | End: 2021-06-20

## 2021-06-18 RX ORDER — PROCHLORPERAZINE EDISYLATE 5 MG/ML
5 INJECTION INTRAMUSCULAR; INTRAVENOUS ONCE AS NEEDED
Status: DISCONTINUED | OUTPATIENT
Start: 2021-06-18 | End: 2021-06-18 | Stop reason: HOSPADM

## 2021-06-18 RX ORDER — FOLIC ACID 1 MG/1
1 TABLET ORAL DAILY
Status: DISCONTINUED | OUTPATIENT
Start: 2021-06-18 | End: 2021-06-20

## 2021-06-18 RX ORDER — HYDROMORPHONE HYDROCHLORIDE 1 MG/ML
0.4 INJECTION, SOLUTION INTRAMUSCULAR; INTRAVENOUS; SUBCUTANEOUS EVERY 5 MIN PRN
Status: DISCONTINUED | OUTPATIENT
Start: 2021-06-18 | End: 2021-06-18 | Stop reason: HOSPADM

## 2021-06-18 RX ORDER — DIPHENHYDRAMINE HYDROCHLORIDE 50 MG/ML
25 INJECTION INTRAMUSCULAR; INTRAVENOUS ONCE AS NEEDED
Status: ACTIVE | OUTPATIENT
Start: 2021-06-18 | End: 2021-06-18

## 2021-06-18 RX ORDER — TEMAZEPAM 30 MG/1
30 CAPSULE ORAL NIGHTLY
Status: DISCONTINUED | OUTPATIENT
Start: 2021-06-18 | End: 2021-06-20

## 2021-06-18 RX ORDER — CEFAZOLIN SODIUM/WATER 2 G/20 ML
2 SYRINGE (ML) INTRAVENOUS EVERY 8 HOURS
Status: COMPLETED | OUTPATIENT
Start: 2021-06-18 | End: 2021-06-18

## 2021-06-18 RX ORDER — MIDAZOLAM HYDROCHLORIDE 1 MG/ML
INJECTION INTRAMUSCULAR; INTRAVENOUS AS NEEDED
Status: DISCONTINUED | OUTPATIENT
Start: 2021-06-18 | End: 2021-06-18 | Stop reason: SURG

## 2021-06-18 RX ORDER — HYDROCODONE BITARTRATE AND ACETAMINOPHEN 7.5; 325 MG/1; MG/1
1 TABLET ORAL EVERY 6 HOURS PRN
Status: DISPENSED | OUTPATIENT
Start: 2021-06-18 | End: 2021-06-19

## 2021-06-18 RX ORDER — MAGNESIUM HYDROXIDE 1200 MG/15ML
LIQUID ORAL CONTINUOUS PRN
Status: COMPLETED | OUTPATIENT
Start: 2021-06-18 | End: 2021-06-18

## 2021-06-18 RX ORDER — ONDANSETRON 2 MG/ML
4 INJECTION INTRAMUSCULAR; INTRAVENOUS ONCE AS NEEDED
Status: DISCONTINUED | OUTPATIENT
Start: 2021-06-18 | End: 2021-06-18 | Stop reason: HOSPADM

## 2021-06-18 RX ORDER — DEXAMETHASONE SODIUM PHOSPHATE 4 MG/ML
VIAL (ML) INJECTION AS NEEDED
Status: DISCONTINUED | OUTPATIENT
Start: 2021-06-18 | End: 2021-06-18 | Stop reason: SURG

## 2021-06-18 RX ORDER — CALCIUM CARBONATE/VITAMIN D3 250-3.125
1 TABLET ORAL 2 TIMES DAILY WITH MEALS
Status: DISCONTINUED | OUTPATIENT
Start: 2021-06-18 | End: 2021-06-20

## 2021-06-18 RX ORDER — DIPHENHYDRAMINE HCL 25 MG
25 CAPSULE ORAL EVERY 4 HOURS PRN
Status: ACTIVE | OUTPATIENT
Start: 2021-06-18 | End: 2021-06-19

## 2021-06-18 RX ORDER — ALBUTEROL SULFATE 90 UG/1
1 AEROSOL, METERED RESPIRATORY (INHALATION) EVERY 6 HOURS PRN
Status: DISCONTINUED | OUTPATIENT
Start: 2021-06-18 | End: 2021-06-20

## 2021-06-18 RX ORDER — SODIUM PHOSPHATE, DIBASIC AND SODIUM PHOSPHATE, MONOBASIC 7; 19 G/133ML; G/133ML
1 ENEMA RECTAL ONCE AS NEEDED
Status: DISCONTINUED | OUTPATIENT
Start: 2021-06-18 | End: 2021-06-20

## 2021-06-18 RX ORDER — TRANEXAMIC ACID 10 MG/ML
INJECTION, SOLUTION INTRAVENOUS AS NEEDED
Status: DISCONTINUED | OUTPATIENT
Start: 2021-06-18 | End: 2021-06-18 | Stop reason: SURG

## 2021-06-18 RX ORDER — ALLOPURINOL 100 MG/1
100 TABLET ORAL DAILY
Status: DISCONTINUED | OUTPATIENT
Start: 2021-06-18 | End: 2021-06-20

## 2021-06-18 RX ORDER — HYDROCODONE BITARTRATE AND ACETAMINOPHEN 7.5; 325 MG/1; MG/1
2 TABLET ORAL EVERY 6 HOURS PRN
Status: ACTIVE | OUTPATIENT
Start: 2021-06-18 | End: 2021-06-19

## 2021-06-18 RX ORDER — MORPHINE SULFATE 1 MG/ML
INJECTION, SOLUTION EPIDURAL; INTRATHECAL; INTRAVENOUS AS NEEDED
Status: DISCONTINUED | OUTPATIENT
Start: 2021-06-18 | End: 2021-06-18 | Stop reason: SURG

## 2021-06-18 RX ORDER — HALOPERIDOL 5 MG/ML
0.25 INJECTION INTRAMUSCULAR ONCE AS NEEDED
Status: DISCONTINUED | OUTPATIENT
Start: 2021-06-18 | End: 2021-06-18 | Stop reason: HOSPADM

## 2021-06-18 RX ORDER — BUPIVACAINE HYDROCHLORIDE 7.5 MG/ML
INJECTION, SOLUTION INTRASPINAL AS NEEDED
Status: DISCONTINUED | OUTPATIENT
Start: 2021-06-18 | End: 2021-06-18 | Stop reason: SURG

## 2021-06-18 RX ORDER — NAPROXEN 500 MG/1
250 TABLET ORAL 2 TIMES DAILY WITH MEALS
Status: DISCONTINUED | OUTPATIENT
Start: 2021-06-18 | End: 2021-06-20

## 2021-06-18 RX ADMIN — DEXAMETHASONE SODIUM PHOSPHATE 4 MG: 4 MG/ML VIAL (ML) INJECTION at 07:45:00

## 2021-06-18 RX ADMIN — SODIUM CHLORIDE, SODIUM LACTATE, POTASSIUM CHLORIDE, CALCIUM CHLORIDE: 600; 310; 30; 20 INJECTION, SOLUTION INTRAVENOUS at 10:26:00

## 2021-06-18 RX ADMIN — TRANEXAMIC ACID 1000 MG: 10 INJECTION, SOLUTION INTRAVENOUS at 10:00:00

## 2021-06-18 RX ADMIN — ONDANSETRON 4 MG: 2 INJECTION INTRAMUSCULAR; INTRAVENOUS at 07:45:00

## 2021-06-18 RX ADMIN — SODIUM CHLORIDE, SODIUM LACTATE, POTASSIUM CHLORIDE, CALCIUM CHLORIDE: 600; 310; 30; 20 INJECTION, SOLUTION INTRAVENOUS at 09:43:00

## 2021-06-18 RX ADMIN — TRANEXAMIC ACID 1000 MG: 10 INJECTION, SOLUTION INTRAVENOUS at 08:19:00

## 2021-06-18 RX ADMIN — PHENYLEPHRINE HCL 100 MCG: 10 MG/ML VIAL (ML) INJECTION at 08:04:00

## 2021-06-18 RX ADMIN — SODIUM CHLORIDE, SODIUM LACTATE, POTASSIUM CHLORIDE, CALCIUM CHLORIDE: 600; 310; 30; 20 INJECTION, SOLUTION INTRAVENOUS at 07:32:00

## 2021-06-18 RX ADMIN — PHENYLEPHRINE HCL 100 MCG: 10 MG/ML VIAL (ML) INJECTION at 09:44:00

## 2021-06-18 RX ADMIN — SODIUM CHLORIDE, SODIUM LACTATE, POTASSIUM CHLORIDE, CALCIUM CHLORIDE: 600; 310; 30; 20 INJECTION, SOLUTION INTRAVENOUS at 08:19:00

## 2021-06-18 RX ADMIN — PHENYLEPHRINE HCL 100 MCG: 10 MG/ML VIAL (ML) INJECTION at 09:49:00

## 2021-06-18 RX ADMIN — PHENYLEPHRINE HCL 100 MCG: 10 MG/ML VIAL (ML) INJECTION at 09:27:00

## 2021-06-18 RX ADMIN — CEFAZOLIN SODIUM/WATER 2 G: 2 G/20 ML SYRINGE (ML) INTRAVENOUS at 07:45:00

## 2021-06-18 RX ADMIN — BUPIVACAINE HYDROCHLORIDE 1.6 ML: 7.5 INJECTION, SOLUTION INTRASPINAL at 07:44:00

## 2021-06-18 RX ADMIN — MIDAZOLAM HYDROCHLORIDE 2 MG: 1 INJECTION INTRAMUSCULAR; INTRAVENOUS at 07:39:00

## 2021-06-18 RX ADMIN — MORPHINE SULFATE 0.3 MG: 1 INJECTION, SOLUTION EPIDURAL; INTRATHECAL; INTRAVENOUS at 07:44:00

## 2021-06-18 NOTE — PHYSICAL THERAPY NOTE
PHYSICAL THERAPY HIP EVALUATION - INPATIENT     Room Number: 908/054-J  Evaluation Date: 6/18/2021  Type of Evaluation: Initial  Physician Order: PT Eval and Treat    Presenting Problem: L SHERRY- anterior  Reason for Therapy: Mobility Dysfunction and Dischar services to address these deficits in preparation for discharge. DISCHARGE RECOMMENDATIONS  PT Discharge Recommendations: Home with home health PT; Intermittent Supervision    PLAN  PT Treatment Plan: Bed mobility; Body mechanics; Endurance; Energy conserva RESTRICTION  Weight Bearing Restriction: L lower extremity           L Lower Extremity: Weight Bearing as Tolerated    PAIN ASSESSMENT  Rating: Unable to rate  Location: L hip  Management Techniques: Activity promotion; Body mechanics;Repositioning    COGNI Exercise/Education Provided:  Bed mobility  Body mechanics  Energy conservation  Functional activity tolerated  Gait training  Posture  ROM  Strengthening  Lower therapeutic exercise:   Ankle pumps  Hip AB/AD  Quad sets  Transfer training    Patient End

## 2021-06-18 NOTE — BRIEF OP NOTE
Pre-Operative Diagnosis: Avascular necrosis of bone of left hip (HCC) [M87.052]     Post-Operative Diagnosis: Same     Procedure Performed:   left total hip arthroplasty anterior approach, fluoroscopy    Surgeon(s) and Role: Cristi Weathers MD - Primary

## 2021-06-18 NOTE — ANESTHESIA PROCEDURE NOTES
Spinal Block  Performed by: Hillary Bauman CRNA  Authorized by: Hillary Bauman CRNA       General Information and Staff    Start Time:  6/18/2021 7:39 AM  End Time:  6/18/2021 7:44 AM  Anesthesiologist:  Clementine Coronel MD  CRNA:  VIVIAN Rudd

## 2021-06-18 NOTE — CM/SW NOTE
06/18/21 1600   CM/SW Referral Data   Referral Source Physician   Reason for Referral Discharge planning   Informant Patient;Spouse   Patient Info   Patient's Mental Status Alert;Oriented   Patient's Home Environment [facility]   Number of Levels in Home

## 2021-06-18 NOTE — PLAN OF CARE
Problem: Patient Centered Care  Goal: Patient preferences are identified and integrated in the patient's plan of care  Description: Interventions:  - What would you like us to know as we care for you?  Home with partner Bessiefernando Ash and he's going to be his supp Assess pain using appropriate pain scale  - Administer analgesics based on type and severity of pain and evaluate response  - Implement non-pharmacological measures as appropriate and evaluate response  - Consider cultural and social influences on pain and patient/family/discharge partner  - Complete POLST form as appropriate  - Assess patient's ability to be responsible for managing their own health  - Refer to Case Management Department for coordinating discharge planning if the patient needs post-hospital site(s) healing without S/S of infection  Description: INTERVENTIONS:  - Assess and document risk factors for pressure ulcer development  - Assess and document skin integrity  - Assess and document dressing/incision, wound bed, drain sites and surrounding

## 2021-06-18 NOTE — ANESTHESIA PREPROCEDURE EVALUATION
Anesthesia PreOp Note    HPI:     Tomas Rodriguez is a 62year old male who presents for preoperative consultation requested by: Дмитрий Moran MD    Date of Surgery: 6/18/2021    Procedure(s):  left total hip arthroplasty anterior approach  Indication:  Av virus (HIV) disease ([facility]         Date Noted: 10/02/2009        Past Medical History:   Diagnosis Date   • Anxiety state    • Chronic kidney disease, stage 3 (HCC)    • COPD (chronic obstructive pulmonary disease) ([facility]    • Depression    • High blood pres by mouth daily. , Disp: , Rfl: , 6/17/2021 at 2200  CloNIDine HCl 0.1 MG Oral Tab, Take 0.1 mg by mouth 2 (two) times daily. , Disp: , Rfl: 2, 6/17/2021 at 2200  allopurinol 100 MG Oral Tab, Take 100 mg by mouth daily. , Disp: , Rfl: 3, 6/17/2021 at 2200  PRE once a week      Drug use: Yes        Types: Cannabis        Comment: daily      Sexual activity: Not on file    Other Topics      Concerns:        Not on file    Social History Narrative      Not on file    Social Determinants of [place] [address] Resp:  16   Temp:  97.9 °F (36.6 °C)   TempSrc:  Oral   SpO2:  97%   Weight: 70.3 kg (155 lb) 70.3 kg (155 lb)   Height: 1.753 m (5' 9\") 1.753 m (5' 9\")        Anesthesia Evaluation     Patient summary reviewed and Nursing notes reviewed    No history AM

## 2021-06-18 NOTE — PROGRESS NOTES
[facility]    Progress Note    Jay Odell Patient Status:  Inpatient    12/10/1960 MRN [id number]   Location [address] [facility] Attending Neeta Dixon MD   Hosp Day # 0 PCP [place] Jay MD     HPI/ 05/26/2021    K 4.2 05/26/2021     (H) 05/26/2021    CO2 22.0 05/26/2021     (H) 05/26/2021    CA 9.3 05/26/2021    ALB 3.5 05/26/2021    ALKPHO 96 05/26/2021    BILT 0.3 05/26/2021    TP 8.3 (H) 05/26/2021    AST 20 05/26/2021    ALT 20 05/26

## 2021-06-18 NOTE — ANESTHESIA POSTPROCEDURE EVALUATION
Patient: Criss Forbes    Procedure Summary     Date: 06/18/21 Room / Location: [address] [place] [address] MAIN OR 10 / [address] [place] [address] MAIN OR    Anesthesia Start: 9162 Anesthesia Stop:     Procedure: left total hip arthroplasty with fluoroscopy, anterior approach (Left Hip) Diagnosis:

## 2021-06-19 PROCEDURE — 99232 SBSQ HOSP IP/OBS MODERATE 35: CPT | Performed by: HOSPITALIST

## 2021-06-19 NOTE — OCCUPATIONAL THERAPY NOTE
OT order rec'd and chart reviewed, nurse Leighton Fung authorized Pt participation. Pt was in bed and stated \"I did too much moving around, I'm in bed not and have too much pain\".   Pt declined to participate in OOB activity,  Pt reported pain at 4-5/10, down

## 2021-06-19 NOTE — PROGRESS NOTES
[place] [facility] - [facility]    Progress Note    Joslny Cavazos Patient Status:  Inpatient    12/10/1960 MRN [id number]   Location [facility] 4W/SW/SE Attending David Chance, [address] Day # 1 PCP Ayana Montano MD     HPI/Subjective:   Sub 6/18/2021  CONCLUSION: Status post total left hip arthroplasty.     Dictated by (CST): Seamus Oliveira MD on 6/18/2021 at 11:39 AM     Finalized by (CST): Seamus Oliveira MD on 6/18/2021 at 11:41 AM                Assessment & Plan:     Osteonecrosis of

## 2021-06-19 NOTE — CM/SW NOTE
[facility] is the only [facility] that has accepted.  reserved this agency in [address]. Orders/F2F already sent. Will need to notify [facility] agency on day of discharge. Evangelista [address]  [address]  DEVON Perkins

## 2021-06-19 NOTE — PLAN OF CARE
Problem: Patient Centered Care  Goal: Patient preferences are identified and integrated in the patient's plan of care  Description: Interventions:  - What would you like us to know as we care for you?  Home with partner Kristen Coleman and he's going to be his supp Assess pain using appropriate pain scale  - Administer analgesics based on type and severity of pain and evaluate response  - Implement non-pharmacological measures as appropriate and evaluate response  - Consider cultural and social influences on pain and patient/family/discharge partner  - Complete POLST form as appropriate  - Assess patient's ability to be responsible for managing their own health  - Refer to Case Management Department for coordinating discharge planning if the patient needs post-hospital site(s) healing without S/S of infection  Description: INTERVENTIONS:  - Assess and document risk factors for pressure ulcer development  - Assess and document skin integrity  - Assess and document dressing/incision, wound bed, drain sites and surrounding

## 2021-06-19 NOTE — ANESTHESIA POST-OP FOLLOW-UP NOTE
S/p L SHERRY  IT Duramorph  POD # 1  Doing well. c/c of pain 5/10 VAS getting suppl meds  No itching no HA no BA no new neurologic signs or symptoms  Site is clean dry intact  Discharged from the service

## 2021-06-19 NOTE — PROGRESS NOTES
[place] [facility] - [facility]    Progress Note    Zac Lafleur Patient Status:  Inpatient    12/10/1960 MRN [id number]   Location [facility] 4W/SW/SE Attending Sean Rizo, [address] [facility] Day # 1 PCP Indigo Baer MD       Subjective:   Db Godwin mg, 17.2 mg, Oral, Nightly  docusate sodium (COLACE) cap 100 mg, 100 mg, Oral, BID  PEG 3350 (MIRALAX) powder packet 17 g, 17 g, Oral, Daily PRN  magnesium hydroxide (MILK OF MAGNESIA) 400 MG/5ML suspension 30 mL, 30 mL, Oral, Daily PRN  bisacodyl (DULCOLA fluoroscopy provided.     Dictated by (CST): Gina Moore MD on 6/18/2021 at 10:16 AM     Finalized by (CST): Gina Moore MD on 6/18/2021 at 10:17 AM          XR HIP W OR WO PELVIS 2 OR 3 VIEWS, LEFT (CPT=73502)    Result Date: 6/18/2021  CONCLUS

## 2021-06-19 NOTE — PHYSICAL THERAPY NOTE
PHYSICAL THERAPY HIP TREATMENT NOTE - INPATIENT    Room Number: 701/736-W            Presenting Problem: L SHERRY anterior    Problem List  Principal Problem:    Osteonecrosis of left hip (HCC)  Active Problems:    Stage 4 chronic kidney disease (HCC)    HIV Fair  Dynamic Standing: [facility]                        O2 WALK       AM-PAC '6-Clicks' INPATIENT SHORT FORM - BASIC MOBILITY  How much difficulty does the patient currently have. ..  -   Turning over in bed (including adjusting bedclothes independent    Goal #3   Current Status 250 ft with CGA/SBA   Goal #4 Patient will negotiate 24 stairs/one curb w/ assistive device and supervision   Goal #4   Current Status 4 flights of stairs with CGA and use of 1 HR   Goal #5 Patient verbalizes and/or

## 2021-06-19 NOTE — OPERATIVE REPORT
[facility]    PATIENT'S NAME: NATALIE MATTHEW   ATTENDING PHYSICIAN: Tanvi Galindo MD   OPERATING PHYSICIAN: Gerhardt Coe Romaine Gone, MD   PATIENT ACCOUNT#:   [id number]    LOCATION:  [address] [place] [address] #:   [id number]       DATE OF BIRTH: and the muscle was then taken medially and the retractor placed horizontally. The circumflex vessels anteriorly were identified and treated with Aquamantys, tied with sutures, and treated again with Aquamantys.   They were then incised and swept medially a duration of the case. It was irrigated again. I used vancomycin powder in the wound due to his pre-existing medical conditions. The capsule was placed over the femoral neck anteriorly.   Fascia was then closed with #1 Vicryl suture and the skin closed wi

## 2021-06-19 NOTE — PLAN OF CARE
Pt is POD #0-1. Vital signs within normal limits. PRN Norco provided for pain. Alert and oriented x4. CMS intact. Tele #51 in place, NSR. On room air. Tolerating general diet. Ocampo in place, to be removed this AM. Dressing clean, dry, and intact.  Up with Pain management with oral medications. - May use ice to incision to prevent swelling or help reduce pain.  - Oral anticoagulant.  - Maintain hip precautions per PT recommendations.  - PT/OT as ordered.   - See additional Care Plan goals for specific interv PLANNING  Goal: Discharge to home or other facility with appropriate resources  Description: INTERVENTIONS:  - Identify barriers to discharge w/pt and caregiver  - Include patient/family/discharge partner in discharge planning  - Arrange for needed dischar ADULT  Goal: Absence of urinary retention  Description: INTERVENTIONS:  - Assess patient’s ability to void and empty bladder  - Monitor intake/output and perform bladder scan as needed  - Follow urinary retention protocol/standard of care  - Consider colla

## 2021-06-20 VITALS
RESPIRATION RATE: 16 BRPM | WEIGHT: 155 LBS | TEMPERATURE: 98 F | HEART RATE: 77 BPM | OXYGEN SATURATION: 98 % | SYSTOLIC BLOOD PRESSURE: 105 MMHG | DIASTOLIC BLOOD PRESSURE: 78 MMHG | BODY MASS INDEX: 22.96 KG/M2 | HEIGHT: 69 IN

## 2021-06-20 PROCEDURE — 99239 HOSP IP/OBS DSCHRG MGMT >30: CPT | Performed by: HOSPITALIST

## 2021-06-20 NOTE — CM/SW NOTE
06/20/21 1335   Discharge disposition   Expected discharge disposition Home-Health   Name of Facillity/Home Care/Hospice   [facility] ALENA Valentin 78)   Discharge transportation [address]   MDO received for discharge. patient was agreeable to [facility].

## 2021-06-20 NOTE — PLAN OF CARE
Problem: Patient Centered Care  Goal: Patient preferences are identified and integrated in the patient's plan of care  Description: Interventions:  - What would you like us to know as we care for you?  Home with partner Valencia Bell and he's going to be his supp Assess pain using appropriate pain scale  - Administer analgesics based on type and severity of pain and evaluate response  - Implement non-pharmacological measures as appropriate and evaluate response  - Consider cultural and social influences on pain and patient/family/discharge partner  - Complete POLST form as appropriate  - Assess patient's ability to be responsible for managing their own health  - Refer to Case Management Department for coordinating discharge planning if the patient needs post-hospital site(s) healing without S/S of infection  Description: INTERVENTIONS:  - Assess and document risk factors for pressure ulcer development  - Assess and document skin integrity  - Assess and document dressing/incision, wound bed, drain sites and surrounding

## 2021-06-20 NOTE — PROGRESS NOTES
[facility]    Progress Note    Angelica Bland Patient Status:  Inpatient    12/10/1960 MRN [id number]   Location [facility] 4W/SW/SE Attending Josh Guzman, [address] [facility] Day # 2 PCP Jeimy Ruffin MD     HPI/Subjective:     C GUSTAVO (obstructive sleep apnea)  Per medicine.               Olga Maldonado MD  6/20/2021

## 2021-06-20 NOTE — PHYSICAL THERAPY NOTE
PHYSICAL THERAPY HIP TREATMENT NOTE - INPATIENT    Room Number: 450/997-M            Presenting Problem: L SHERRY anterior    Problem List  Principal Problem:    Osteonecrosis of left hip (HCC)  Active Problems:    Stage 4 chronic kidney disease (HCC)    HIV etc.): None   -   Moving from lying on back to sitting on the side of the bed?: None   How much help from another person does the patient currently need. ..   -   Moving to and from a bed to a chair (including a wheelchair)?: None   -   Need to walk in hosp in preparation for discharge.    Goal #6  Current Status In progress

## 2021-06-20 NOTE — DISCHARGE SUMMARY
[place] [facility] - [facility]    Discharge Summary    Jayy Houserb Patient Status:  Inpatient    12/10/1960 MRN [id number]   Location [facility] 4W/SW/SE Attending Ted Ramírez, [address] [facility] Day # 2 PCP Adalberto Luu MD     Date of Admission click, rub or gallop, regular rate and rhythm  Abdominal: soft, non-tender; bowel sounds normal; no masses,  no organomegaly  Extremities: extremities normal, atraumatic, no cyanosis or edema  Psychiatric: calm        History of Present Illness: Here for e Quantity: 30 tablet  Refills: 0     naproxen 500 MG Tabs  Commonly known as: NAPROSYN      Take 1 tablet (500 mg total) by mouth 2 (two) times daily with meals. Take with food. Stop if stomach upset.    Quantity: 60 tablet  Refills: 0        CHANGE how you known as: ZOLOFT      TAKE 2 TABLETS(200 MG) BY MOUTH EVERY DAY   Quantity: 180 tablet  Refills: 1     traZODone HCl 50 MG Tabs  Commonly known as: DESYREL      TAKE 1 TABLET(50 MG) BY MOUTH TWICE DAILY   Quantity: 60 tablet  Refills: 5     zidovudine 300

## 2021-06-22 ENCOUNTER — TELEPHONE (OUTPATIENT)
Dept: ORTHOPEDICS CLINIC | Facility: CLINIC | Age: 61
End: 2021-06-22

## 2021-06-22 NOTE — TELEPHONE ENCOUNTER
Received fax from [facility] in [place] requesting PA for hydrocodone - acetaminophen 10/325 mg. Norco 10/325 mg Rx'd on 06/18/21.      Called [facility] in [place] and spoke to pharmacy staff, Patricia Gonzalez, who states that pt has not picked up the [address] [facility] [facility] 10/325 m

## 2021-06-29 ENCOUNTER — OFFICE VISIT (OUTPATIENT)
Dept: ORTHOPEDICS CLINIC | Facility: CLINIC | Age: 61
End: 2021-06-29
Payer: MEDICAID

## 2021-06-29 ENCOUNTER — HOSPITAL ENCOUNTER (OUTPATIENT)
Dept: GENERAL RADIOLOGY | Facility: HOSPITAL | Age: 61
Discharge: HOME | End: 2021-06-29
Attending: ORTHOPAEDIC SURGERY
Payer: MEDICAID

## 2021-06-29 DIAGNOSIS — Z47.89 ORTHOPEDIC AFTERCARE: ICD-10-CM

## 2021-06-29 DIAGNOSIS — M87.052 AVASCULAR NECROSIS OF BONE OF LEFT HIP (HCC): Primary | ICD-10-CM

## 2021-06-29 PROCEDURE — 99024 POSTOP FOLLOW-UP VISIT: CPT | Performed by: ORTHOPAEDIC SURGERY

## 2021-06-29 NOTE — PROGRESS NOTES
NURSING INTAKE COMMENTS: Patient presents with:  Post-Op: post op on hip surgery. pt states that he feels very good, rates pain 4/10.        HPI: This 62year old male presents today status post left total hip arthroplasty with anterior approach on 6/18/202 for 30 days post-op 60 tablet 0   • TRAZODONE HCL 50 MG Oral Tab TAKE 1 TABLET(50 MG) BY MOUTH TWICE DAILY 60 tablet 5   • TEMAZEPAM 30 MG Oral Cap TAKE 1 CAPSULE(30 MG) BY MOUTH DAILY (Patient taking differently: Take 30 mg by mouth nightly.  ) 30 capsule Substance and Sexual Activity      Alcohol use: Yes        Comment: once a week      Drug use: Yes        Types: Cannabis        Comment: daily      Sexual activity: Not on file       Review of Systems:  GENERAL: feels generally well, no significant weight 6/18/2021  PROCEDURE: XR FLUORO PHYSICIAN TIME< 1 HOUR (CPT=76000)  COMPARISON: None. INDICATIONS: Left total hip arthroplasty anterior approach. TECHNIQUE:  5 spot fluoroscopic images. FLUOROSCOPY EXPOSURE TIME:   34.9 seconds.   FINDINGS: [facility] PHYSICAL THERAPY - INTERNAL        Assessment: Status post left anterior total hip arthroplasty performed on 6/18/2021. Progressing very well postoperatively. Plan: Patient given prescription start outpatient physical therapy.   Continue weightbearing

## 2021-07-02 ENCOUNTER — OFFICE VISIT (OUTPATIENT)
Dept: PHYSICAL THERAPY | Facility: HOSPITAL | Age: 61
End: 2021-07-02
Attending: PHYSICIAN ASSISTANT
Payer: MEDICAID

## 2021-07-02 DIAGNOSIS — Z47.89 ORTHOPEDIC AFTERCARE: ICD-10-CM

## 2021-07-02 DIAGNOSIS — M87.052 AVASCULAR NECROSIS OF BONE OF LEFT HIP (HCC): ICD-10-CM

## 2021-07-02 PROCEDURE — 97162 PT EVAL MOD COMPLEX 30 MIN: CPT

## 2021-07-02 PROCEDURE — 97110 THERAPEUTIC EXERCISES: CPT

## 2021-07-02 NOTE — PROGRESS NOTES
PHYSICAL THERAPY EVALUATION:   Referring Physician: Dr. Liza De La Rosa  Diagnosis: left hip pain, orthopedic aftercare  Date of Service: 7/2/2021     PATIENT SUMMARY   Precautions: standard    Past medical history: reviewed via epic electronic medical record gain/loss  Nausea/vomiting  Chest pains/heart palpitations  Dizziness/lightheadedness/loss of consciousness  Bowel/bladder problems  Unrelenting night pain  Excessive fatigue/lack of energy  Numbness/tingling GETTING SOME NUMBNESS/TINGLING IN IN THE LEFT F business and participate in all desired exercise activities. Skilled Physical Therapy is medically necessary to address the above impairments and reach functional goals.      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 • The patient will be able to lift and carry loads up to 25 lbs to enable him to return to work in his [employer].   • The patient will be able to perform repeated unsupp

## 2021-07-06 ENCOUNTER — OFFICE VISIT (OUTPATIENT)
Dept: PHYSICAL THERAPY | Facility: HOSPITAL | Age: 61
End: 2021-07-06
Attending: PHYSICIAN ASSISTANT
Payer: MEDICAID

## 2021-07-06 PROCEDURE — 97110 THERAPEUTIC EXERCISES: CPT

## 2021-07-06 PROCEDURE — 97116 GAIT TRAINING THERAPY: CPT

## 2021-07-06 NOTE — PROGRESS NOTES
PHYSICAL THERAPY DAILY TREATMENT NOTE  Precautions: standard/universal  Fall risk: standard  Date of Evaluation: 7/2/21  Diagnosis: left hip pain, orthopedic aftercare      Insurance Type (# Auth): [employer] [facility] (12 visits)  Date of surgery: 6/18/21  Visit #: 2 skilled physical therapy interventions to improve function and achieve all established physical therapy goals.     Plan for next visit: gait training with SPC, functional strengthening       Home Exercise Program  · SIT STAND - ONE HAND ASSIST -  Repeat 5 T Time: 45 minutes  Charges: 2 therex, 1 gait

## 2021-07-09 ENCOUNTER — TELEPHONE (OUTPATIENT)
Dept: PHYSICAL THERAPY | Facility: HOSPITAL | Age: 61
End: 2021-07-09

## 2021-07-09 ENCOUNTER — APPOINTMENT (OUTPATIENT)
Dept: PHYSICAL THERAPY | Facility: HOSPITAL | Age: 61
End: 2021-07-09
Attending: PHYSICIAN ASSISTANT
Payer: MEDICAID

## 2021-07-14 RX ORDER — FOLIC ACID 1 MG/1
TABLET ORAL
Qty: 90 TABLET | Refills: 3 | Status: SHIPPED | OUTPATIENT
Start: 2021-07-14

## 2021-07-15 ENCOUNTER — APPOINTMENT (OUTPATIENT)
Dept: PHYSICAL THERAPY | Facility: HOSPITAL | Age: 61
End: 2021-07-15
Attending: INTERNAL MEDICINE
Payer: MEDICAID

## 2021-07-15 ENCOUNTER — TELEPHONE (OUTPATIENT)
Dept: PHYSICAL THERAPY | Facility: HOSPITAL | Age: 61
End: 2021-07-15

## 2021-07-15 NOTE — TELEPHONE ENCOUNTER
Patient no show or called to cancelled for today's appointment. clin called and left VM regarding no show and attendance police. Confirm next appointment.

## 2021-07-16 ENCOUNTER — OFFICE VISIT (OUTPATIENT)
Dept: PHYSICAL THERAPY | Facility: HOSPITAL | Age: 61
End: 2021-07-16
Attending: PHYSICIAN ASSISTANT
Payer: MEDICAID

## 2021-07-16 PROCEDURE — 97110 THERAPEUTIC EXERCISES: CPT

## 2021-07-16 NOTE — PROGRESS NOTES
PHYSICAL THERAPY DAILY TREATMENT NOTE  Precautions: L SHERRY- anterior, standard/universal  Fall risk: standard  Date of Evaluation: 7/2/21  Diagnosis: left hip pain, orthopedic aftercare      Insurance Type (# Auth): Corrine Botello (12 visits)  Date of surgery: 6/1 Set, Perform 3 Times a Day  · Hip Bridge -  Repeat 10 Times, Complete 1 Set, Perform 3 Times a Day  · SIDE LYING REVERSE CLAM SHELL - REVERSE CLAMSHELL -  Repeat 10 Times, Hold 1 Second(s), Complete 1 Set, Perform 3 Times a Day  · SIDE LYING CLAMSHELL - CL

## 2021-07-20 ENCOUNTER — OFFICE VISIT (OUTPATIENT)
Dept: PHYSICAL THERAPY | Facility: HOSPITAL | Age: 61
End: 2021-07-20
Attending: PHYSICIAN ASSISTANT
Payer: MEDICAID

## 2021-07-20 NOTE — PROGRESS NOTES
Patient arrived on time for session, however start Nu-step and he told me he ext last night Pizza and feel heavy stomach not feel comfortable finish 1 min Nu step and start seated hip exercises squeeze ball and per patient I feel not good let me go feel li

## 2021-07-23 ENCOUNTER — OFFICE VISIT (OUTPATIENT)
Dept: PHYSICAL THERAPY | Facility: HOSPITAL | Age: 61
End: 2021-07-23
Attending: PHYSICIAN ASSISTANT
Payer: MEDICAID

## 2021-07-23 PROCEDURE — 97110 THERAPEUTIC EXERCISES: CPT

## 2021-07-23 NOTE — PROGRESS NOTES
PHYSICAL THERAPY DAILY TREATMENT NOTE  Precautions: L SHERRY- anterior, standard/universal  Fall risk: standard  Date of Evaluation: 7/2/21  Diagnosis: left hip pain, orthopedic aftercare      Insurance Type (# Auth): [employer] (12 visits)  Date of surgery: 6/1 a Day  · Squats – Supported -  Repeat 5 Times, Perform 3 Times a Day  · LONG ARC QUAD - LAQ - KNEE EXTENSION -  Repeat 15 Times, Hold 1 Second(s), Complete 1 Set, Perform 3 Times a Day  · Hip Bridge -  Repeat 10 Times, Complete 1 Set, Perform 3 Times a Day

## 2021-07-27 ENCOUNTER — TELEPHONE (OUTPATIENT)
Dept: INTERNAL MEDICINE CLINIC | Facility: CLINIC | Age: 61
End: 2021-07-27

## 2021-07-27 ENCOUNTER — HOSPITAL ENCOUNTER (OUTPATIENT)
Dept: GENERAL RADIOLOGY | Facility: HOSPITAL | Age: 61
Discharge: HOME | End: 2021-07-27
Attending: ORTHOPAEDIC SURGERY
Payer: MEDICAID

## 2021-07-27 ENCOUNTER — OFFICE VISIT (OUTPATIENT)
Dept: ORTHOPEDICS CLINIC | Facility: CLINIC | Age: 61
End: 2021-07-27
Payer: MEDICAID

## 2021-07-27 DIAGNOSIS — M25.551 RIGHT HIP PAIN: ICD-10-CM

## 2021-07-27 DIAGNOSIS — M87.052 AVASCULAR NECROSIS OF BONE OF LEFT HIP (HCC): ICD-10-CM

## 2021-07-27 DIAGNOSIS — G89.29 CHRONIC PAIN OF BOTH KNEES: ICD-10-CM

## 2021-07-27 DIAGNOSIS — M25.562 CHRONIC PAIN OF BOTH KNEES: ICD-10-CM

## 2021-07-27 DIAGNOSIS — M25.561 CHRONIC PAIN OF BOTH KNEES: ICD-10-CM

## 2021-07-27 DIAGNOSIS — M87.051 AVASCULAR NECROSIS OF HIP, RIGHT (HCC): Primary | ICD-10-CM

## 2021-07-27 PROCEDURE — 99213 OFFICE O/P EST LOW 20 MIN: CPT | Performed by: ORTHOPAEDIC SURGERY

## 2021-07-27 PROCEDURE — 73502 X-RAY EXAM HIP UNI 2-3 VIEWS: CPT | Performed by: ORTHOPAEDIC SURGERY

## 2021-07-27 NOTE — H&P
NURSING INTAKE COMMENTS: Patient presents with:  Post-Op: Left total hip arthroplasty, anterior approach done on 6/18/21.   Consult: Patient is here to talk about right hip replacement, he would like to look at sometime in early to mid august to be ready fo nightly.  ) 30 capsule 5   • SERTRALINE  MG Oral Tab TAKE 2 TABLETS(200 MG) BY MOUTH EVERY  tablet 1   • Fluticasone Propionate 50 MCG/ACT Nasal Suspension 2 sprays by Nasal route daily.  1 Bottle 5   • ATORVASTATIN 10 MG Oral Tab TAKE 1 TABLE lesions  EYES:denies blurred vision or double vision  HEENT: denies new nasal congestion, sinus pain or ST  LUNGS: denies shortness of breath  CARDIOVASCULAR: denies chest pain  GI: no hematemesis, no worsening heartburn, no diarrhea  : no dysuria, no bl

## 2021-07-27 NOTE — TELEPHONE ENCOUNTER
Patient is calling back he spoke to his surgeon and they told him to slow down, patient scheduled a presurgical on 8/11/21  He will get the lab order from Dr Greyson Hutchinson at the time of the appointment

## 2021-07-27 NOTE — PROGRESS NOTES
NURSING INTAKE COMMENTS: Patient presents with:  Post-Op: Left total hip arthroplasty, anterior approach done on 6/18/21.   Consult: Patient is here to talk about right hip replacement, he would like to look at sometime in early to mid august to be ready fo hallucinating. 30 tablet 0   • Multiple Vitamins-Minerals (CEROVITE SENIOR) Oral Tab Take 1 tablet by mouth daily. 30 tablet 0   • naproxen 500 MG Oral Tab Take 1 tablet (500 mg total) by mouth 2 (two) times daily with meals. Take with food.  Stop if stomac History      Not on file    Tobacco Use      Smoking status: Former Smoker        Packs/day: 0.50        Years: 20.00        Pack years: 10        Types: Cigarettes        Quit date: 1/23/2011        Years since quitting: 10.5      Smokeless tobacco: Never replacement well fixed and in proper alignment. 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 Lengths look about equal.      No results found.      Lab Results   Component Value Date    WBC 5.7 05/26/2021    HGB 8.3 (L) 06/19/2021

## 2021-07-27 NOTE — TELEPHONE ENCOUNTER
Patient is calling to request an order for lab work  He is having hip surgery possibly the 2nd week of August    Patient will be going to have labs drawn for another Dr tomorrow 7/28, he would like to know if Dr Alex Rogers will place an order  He will be using [employer], he will call back with fax #

## 2021-07-28 ENCOUNTER — APPOINTMENT (OUTPATIENT)
Dept: PHYSICAL THERAPY | Facility: HOSPITAL | Age: 61
End: 2021-07-28
Attending: INTERNAL MEDICINE
Payer: MEDICAID

## 2021-07-29 ENCOUNTER — TELEPHONE (OUTPATIENT)
Dept: PHYSICAL THERAPY | Facility: HOSPITAL | Age: 61
End: 2021-07-29

## 2021-07-30 ENCOUNTER — APPOINTMENT (OUTPATIENT)
Dept: PHYSICAL THERAPY | Facility: HOSPITAL | Age: 61
End: 2021-07-30
Attending: INTERNAL MEDICINE
Payer: MEDICAID

## 2021-07-30 ENCOUNTER — TELEPHONE (OUTPATIENT)
Dept: ORTHOPEDICS CLINIC | Facility: CLINIC | Age: 61
End: 2021-07-30

## 2021-08-02 ENCOUNTER — OFFICE VISIT (OUTPATIENT)
Dept: PHYSICAL THERAPY | Facility: HOSPITAL | Age: 61
End: 2021-08-02
Attending: PHYSICIAN ASSISTANT
Payer: MEDICAID

## 2021-08-02 PROCEDURE — 97110 THERAPEUTIC EXERCISES: CPT

## 2021-08-02 NOTE — PROGRESS NOTES
PHYSICAL THERAPY DAILY TREATMENT NOTE  Precautions: standard/universal  Fall risk: standard  Date of Evaluation: 7/2/21  Diagnosis: left hip pain, orthopedic aftercare      Insurance Type (# Auth): Chandu Grijalva (12 visits)  Date of surgery: 6/18/21  Visit #: Response to Treatment: Berta Cano tolerated treatment well with no adverse reactions or reports of increased pain above baseline. Assessment: Berta Cano is progressing well post operatively.  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 · The patient will be able to lift and carry loads up to 25 lbs to enable him to return to work in his [employer].   · The patient will be able to perform repeated unsupported squatting to en

## 2021-08-03 ENCOUNTER — APPOINTMENT (OUTPATIENT)
Dept: PHYSICAL THERAPY | Facility: HOSPITAL | Age: 61
End: 2021-08-03
Attending: INTERNAL MEDICINE
Payer: MEDICAID

## 2021-08-03 ENCOUNTER — TELEPHONE (OUTPATIENT)
Dept: PHYSICAL THERAPY | Facility: HOSPITAL | Age: 61
End: 2021-08-03

## 2021-08-04 ENCOUNTER — APPOINTMENT (OUTPATIENT)
Dept: PHYSICAL THERAPY | Facility: HOSPITAL | Age: 61
End: 2021-08-04
Attending: INTERNAL MEDICINE
Payer: MEDICAID

## 2021-08-10 ENCOUNTER — OFFICE VISIT (OUTPATIENT)
Dept: PHYSICAL THERAPY | Facility: HOSPITAL | Age: 61
End: 2021-08-10
Attending: PHYSICIAN ASSISTANT
Payer: MEDICAID

## 2021-08-10 NOTE — PROGRESS NOTES
PHYSICAL THERAPY DAILY TREATMENT NOTE  Precautions: standard/universal  Fall risk: standard  Date of Evaluation: 7/2/21  Diagnosis: left hip pain, orthopedic aftercare      Insurance Type (# Auth): Katarina Schaeffer (12 visits)  Date of surgery: 6/18/21  Visit #:  8 Day  · Squats – Supported -  Repeat 5 Times, Perform 3 Times a Day  · LONG ARC QUAD - LAQ - KNEE EXTENSION -  Repeat 15 Times, Hold 1 Second(s), Complete 1 Set, Perform 3 Times a Day  · Hip Bridge -  Repeat 10 Times, Complete 1 Set, Perform 3 Times a Day

## 2021-08-12 ENCOUNTER — APPOINTMENT (OUTPATIENT)
Dept: PHYSICAL THERAPY | Facility: HOSPITAL | Age: 61
End: 2021-08-12
Attending: PHYSICIAN ASSISTANT
Payer: MEDICAID

## 2021-08-16 ENCOUNTER — TELEPHONE (OUTPATIENT)
Dept: INTERNAL MEDICINE CLINIC | Facility: CLINIC | Age: 61
End: 2021-08-16

## 2021-08-16 NOTE — TELEPHONE ENCOUNTER
Dr. Raman Ibarra reviewed and signed order from [employer]. Faxed back to [phone]. Fax confirmation received. Copy to scanning.

## 2021-08-25 ENCOUNTER — APPOINTMENT (OUTPATIENT)
Dept: PHYSICAL THERAPY | Facility: HOSPITAL | Age: 61
End: 2021-08-25
Attending: INTERNAL MEDICINE
Payer: MEDICAID

## 2021-09-08 ENCOUNTER — APPOINTMENT (OUTPATIENT)
Dept: PHYSICAL THERAPY | Facility: HOSPITAL | Age: 61
End: 2021-09-08
Attending: INTERNAL MEDICINE
Payer: MEDICAID

## 2021-09-13 ENCOUNTER — APPOINTMENT (OUTPATIENT)
Dept: PHYSICAL THERAPY | Facility: HOSPITAL | Age: 61
End: 2021-09-13
Payer: MEDICAID

## 2021-09-15 ENCOUNTER — APPOINTMENT (OUTPATIENT)
Dept: PHYSICAL THERAPY | Facility: HOSPITAL | Age: 61
End: 2021-09-15
Attending: INTERNAL MEDICINE
Payer: MEDICAID

## 2021-09-17 ENCOUNTER — TELEPHONE (OUTPATIENT)
Dept: ORTHOPEDICS CLINIC | Facility: CLINIC | Age: 61
End: 2021-09-17

## 2021-09-17 DIAGNOSIS — M87.051 AVASCULAR NECROSIS OF HIP, RIGHT (HCC): Primary | ICD-10-CM

## 2021-09-17 NOTE — TELEPHONE ENCOUNTER
Type of surgery:  RIGHT ANTERIOR TOTAL HIP ARTHROPLASTY, FLUORSCOPY  Date: 10/22/21  Location: [facility] 23 HOUR HOLD  Medical Clearance:      *Medical:YES      *Dental:NO      *Other:  Prior Authorization Status: PENDING  Workers Comp:  Medacta/Kyle:  Cometa

## 2021-09-20 ENCOUNTER — APPOINTMENT (OUTPATIENT)
Dept: PHYSICAL THERAPY | Facility: HOSPITAL | Age: 61
End: 2021-09-20
Payer: MEDICAID

## 2021-09-22 ENCOUNTER — APPOINTMENT (OUTPATIENT)
Dept: PHYSICAL THERAPY | Facility: HOSPITAL | Age: 61
End: 2021-09-22
Attending: INTERNAL MEDICINE
Payer: MEDICAID

## 2021-09-27 ENCOUNTER — APPOINTMENT (OUTPATIENT)
Dept: PHYSICAL THERAPY | Facility: HOSPITAL | Age: 61
End: 2021-09-27
Payer: MEDICAID

## 2021-09-27 ENCOUNTER — TELEPHONE (OUTPATIENT)
Dept: ORTHOPEDICS CLINIC | Facility: CLINIC | Age: 61
End: 2021-09-27

## 2021-09-29 ENCOUNTER — APPOINTMENT (OUTPATIENT)
Dept: PHYSICAL THERAPY | Facility: HOSPITAL | Age: 61
End: 2021-09-29
Attending: INTERNAL MEDICINE
Payer: MEDICAID

## 2021-09-30 ENCOUNTER — MED REC SCAN ONLY (OUTPATIENT)
Dept: ORTHOPEDICS CLINIC | Facility: CLINIC | Age: 61
End: 2021-09-30

## 2021-09-30 ENCOUNTER — TELEPHONE (OUTPATIENT)
Dept: INTERNAL MEDICINE CLINIC | Facility: CLINIC | Age: 61
End: 2021-09-30

## 2021-09-30 NOTE — TELEPHONE ENCOUNTER
To Dr. Raman Ibarra - see below. Last office visit here: 5/21/21. Pt scheduled for 10/22/21 - r hip arthroplasty.

## 2021-09-30 NOTE — TELEPHONE ENCOUNTER
Patient is calling after missing his appointment tokellie. Patient states he thought it was tomorrow 10/1/2021 instead of today.  Patient is calling elsa to request a new appointment before his surgery on in right hip on 10/22/2021 or a phone call from Dr Sheri Graham

## 2021-10-01 ENCOUNTER — OFFICE VISIT (OUTPATIENT)
Dept: INTERNAL MEDICINE CLINIC | Facility: CLINIC | Age: 61
End: 2021-10-01
Payer: MEDICAID

## 2021-10-01 VITALS
WEIGHT: 148 LBS | DIASTOLIC BLOOD PRESSURE: 70 MMHG | BODY MASS INDEX: 21.92 KG/M2 | HEART RATE: 64 BPM | HEIGHT: 69 IN | SYSTOLIC BLOOD PRESSURE: 120 MMHG | TEMPERATURE: 98 F

## 2021-10-01 DIAGNOSIS — J43.9 PULMONARY EMPHYSEMA, UNSPECIFIED EMPHYSEMA TYPE (HCC): ICD-10-CM

## 2021-10-01 DIAGNOSIS — M87.051 AVASCULAR NECROSIS OF BONES OF BOTH HIPS (HCC): ICD-10-CM

## 2021-10-01 DIAGNOSIS — I35.1 AORTIC VALVE INSUFFICIENCY, ETIOLOGY OF CARDIAC VALVE DISEASE UNSPECIFIED: ICD-10-CM

## 2021-10-01 DIAGNOSIS — E78.00 HYPERCHOLESTEROLEMIA: ICD-10-CM

## 2021-10-01 DIAGNOSIS — Z01.818 PREOPERATIVE GENERAL PHYSICAL EXAMINATION: Primary | ICD-10-CM

## 2021-10-01 DIAGNOSIS — I10 ESSENTIAL HYPERTENSION: ICD-10-CM

## 2021-10-01 DIAGNOSIS — M15.9 PRIMARY OSTEOARTHRITIS INVOLVING MULTIPLE JOINTS: ICD-10-CM

## 2021-10-01 DIAGNOSIS — N18.4 ANEMIA DUE TO STAGE 4 CHRONIC KIDNEY DISEASE (HCC): ICD-10-CM

## 2021-10-01 DIAGNOSIS — M87.052 AVASCULAR NECROSIS OF BONES OF BOTH HIPS (HCC): ICD-10-CM

## 2021-10-01 DIAGNOSIS — R53.83 FATIGUE, UNSPECIFIED TYPE: ICD-10-CM

## 2021-10-01 DIAGNOSIS — E78.1 HYPERTRIGLYCERIDEMIA: ICD-10-CM

## 2021-10-01 DIAGNOSIS — D63.1 ANEMIA DUE TO STAGE 4 CHRONIC KIDNEY DISEASE (HCC): ICD-10-CM

## 2021-10-01 DIAGNOSIS — N18.4 STAGE 4 CHRONIC KIDNEY DISEASE (HCC): ICD-10-CM

## 2021-10-01 DIAGNOSIS — B20 HUMAN IMMUNODEFICIENCY VIRUS (HIV) DISEASE (HCC): ICD-10-CM

## 2021-10-01 DIAGNOSIS — R73.01 ABNORMAL FASTING GLUCOSE: ICD-10-CM

## 2021-10-01 PROCEDURE — 3074F SYST BP LT 130 MM HG: CPT | Performed by: INTERNAL MEDICINE

## 2021-10-01 PROCEDURE — 3008F BODY MASS INDEX DOCD: CPT | Performed by: INTERNAL MEDICINE

## 2021-10-01 PROCEDURE — 3078F DIAST BP <80 MM HG: CPT | Performed by: INTERNAL MEDICINE

## 2021-10-01 PROCEDURE — 99215 OFFICE O/P EST HI 40 MIN: CPT | Performed by: INTERNAL MEDICINE

## 2021-10-01 RX ORDER — HYDROCODONE BITARTRATE AND ACETAMINOPHEN 10; 325 MG/1; MG/1
1 TABLET ORAL EVERY 6 HOURS PRN
Qty: 30 TABLET | Refills: 0 | Status: ON HOLD | OUTPATIENT
Start: 2021-10-01 | End: 2021-10-22

## 2021-10-01 NOTE — PROGRESS NOTES
Lore Dinero is a 62year old male who presents for a preoperative history and physical examination and medical clearance examination. HPI:   Mr. Eula Saenz.   Fidel Montero is a 52-year-old black male who was seen by me on October 1, 2021 for his preoperative history needed for Pain. No alcohol or driving on this med. Stop if lethargic or hallucinating.  30 tablet 0   • FOLIC ACID 1 MG Oral Tab TAKE 1 TABLET(1 MG) BY MOUTH DAILY 90 tablet 3   • Calcium Citrate-Vitamin D 315-250 MG-UNIT Oral Tab Take 1 tablet by mouth 2 tablet 0      Past Medical History:   Diagnosis Date   • Anxiety state    • Chronic kidney disease, stage 3 (HCC)    • COPD (chronic obstructive pulmonary disease) (HCC)    • Depression    • High blood pressure    • High cholesterol    • HIV disease ([facility] normocephalic, normal oropharynx, ears appear normal.  EYES:PERRLA, EOMI, conjunctivae pink, sclerae are nonicteric  NECK: supple,no cervical or supraclavicular lymphadenopathy or palpable masses,no carotid bruits  CHEST: no chest tenderness  LUNGS: clear well.  CPM.    5. Hypercholesterolemia  Stable. CPM.  I will continue to monitor this in the future. 6. Hypertriglyceridemia  Stable. CPM.  I will continue to monitor this in the future.     7. Aortic valve insufficiency, etiology of cardiac valve dise

## 2021-10-01 NOTE — PATIENT INSTRUCTIONS
1.  Patient is doing well at this time. Patient appears stable medically and should be able to tolerate proposed surgery. 2.  Patient is to continue his current diet, medication and activity.   3.  I will tentatively plan to see the patient back about 3 m

## 2021-10-01 NOTE — TELEPHONE ENCOUNTER
Noted.  I contacted patient and asked him to come to see me on Friday at 1:00 in the afternoon. I will forward this message to the  and nursing.   Thank you!!

## 2021-10-04 NOTE — TELEPHONE ENCOUNTER
Dr. Basil Diggs pre-op note faxed to Dr. Fern Singh at [phone]. Fax confirmation received. Back to Dr. Zuleima Parsons.

## 2021-10-10 ENCOUNTER — TELEPHONE (OUTPATIENT)
Dept: INTERNAL MEDICINE CLINIC | Facility: CLINIC | Age: 61
End: 2021-10-10

## 2021-10-11 RX ORDER — TEMAZEPAM 30 MG/1
CAPSULE ORAL
Qty: 30 CAPSULE | Refills: 5 | Status: SHIPPED | OUTPATIENT
Start: 2021-10-11

## 2021-10-11 NOTE — TELEPHONE ENCOUNTER
To MD:  The above refill request is for a controlled substance. Please review pended medication order. Print and sign for staff to fax to pharmacy or prescribe electronically.     Last office visit: 10/1/2021  Last time refill sent and quantity/refills:

## 2021-10-13 DIAGNOSIS — M87.051 AVASCULAR NECROSIS OF HIP, RIGHT (HCC): Primary | ICD-10-CM

## 2021-10-13 NOTE — TELEPHONE ENCOUNTER
Patient cleared by PCP   Prior Auth recv'd    Case change request put in 10/13/21 for the following:  Needs Ancef 2g IV and Vanco 1g IV - Preop at the time of surgery

## 2021-10-18 ENCOUNTER — TELEPHONE (OUTPATIENT)
Dept: ORTHOPEDICS CLINIC | Facility: CLINIC | Age: 61
End: 2021-10-18

## 2021-10-19 ENCOUNTER — LAB ENCOUNTER (OUTPATIENT)
Dept: LAB | Facility: HOSPITAL | Age: 61
End: 2021-10-19
Attending: ORTHOPAEDIC SURGERY
Payer: MEDICAID

## 2021-10-19 DIAGNOSIS — Z01.818 PREOP TESTING: ICD-10-CM

## 2021-10-19 PROCEDURE — 86901 BLOOD TYPING SEROLOGIC RH(D): CPT

## 2021-10-19 PROCEDURE — 86850 RBC ANTIBODY SCREEN: CPT

## 2021-10-19 PROCEDURE — 87641 MR-STAPH DNA AMP PROBE: CPT

## 2021-10-19 PROCEDURE — 36415 COLL VENOUS BLD VENIPUNCTURE: CPT

## 2021-10-19 PROCEDURE — 86900 BLOOD TYPING SEROLOGIC ABO: CPT

## 2021-10-21 NOTE — H&P
NURSING INTAKE COMMENTS: No chief complaint on file. HPI: This 62year old male presents today discuss right anterior total hip arthroplasty. The above history is accurate. He has avascular process of both hips. His left hip is doing very well.   In mouth daily.  30 tablet 0   • TRAZODONE HCL 50 MG Oral Tab TAKE 1 TABLET(50 MG) BY MOUTH TWICE DAILY 60 tablet 5   • SERTRALINE  MG Oral Tab TAKE 2 TABLETS(200 MG) BY MOUTH EVERY  tablet 1   • Fluticasone Propionate 50 MCG/ACT Nasal Suspension Years: 20.00        Pack years: 10        Types: Cigarettes        Quit date: 1/23/2011        Years since quitting: 10.7      Smokeless tobacco: Never Used    Vaping Use      Vaping Use: Never used    Substance and Sexual Activity      Alcohol use:  Yes end-stage osteoarthritis. Lengths look about equal.      No results found.      Lab Results   Component Value Date    WBC 5.7 05/26/2021    HGB 8.3 (L) 06/19/2021    .0 05/26/2021      Lab Results   Component Value Date     (H) 05/26/2021

## 2021-10-22 ENCOUNTER — ANESTHESIA (OUTPATIENT)
Dept: SURGERY | Facility: HOSPITAL | Age: 61
End: 2021-10-22
Payer: MEDICAID

## 2021-10-22 ENCOUNTER — APPOINTMENT (OUTPATIENT)
Dept: GENERAL RADIOLOGY | Facility: HOSPITAL | Age: 61
End: 2021-10-22
Attending: ORTHOPAEDIC SURGERY
Payer: MEDICAID

## 2021-10-22 ENCOUNTER — ANESTHESIA EVENT (OUTPATIENT)
Dept: SURGERY | Facility: HOSPITAL | Age: 61
End: 2021-10-22
Payer: MEDICAID

## 2021-10-22 ENCOUNTER — HOSPITAL ENCOUNTER (OUTPATIENT)
Facility: HOSPITAL | Age: 61
Setting detail: OBSERVATION
Discharge: HOME HEALTH | End: 2021-10-24
Attending: ORTHOPAEDIC SURGERY | Admitting: ORTHOPAEDIC SURGERY
Payer: MEDICAID

## 2021-10-22 DIAGNOSIS — Z01.818 PREOP TESTING: Primary | ICD-10-CM

## 2021-10-22 DIAGNOSIS — M87.051 AVASCULAR NECROSIS OF HIP, RIGHT (HCC): ICD-10-CM

## 2021-10-22 PROBLEM — M87.9 OSTEONECROSIS OF RIGHT HIP (HCC): Status: ACTIVE | Noted: 2021-10-22

## 2021-10-22 PROCEDURE — 3008F BODY MASS INDEX DOCD: CPT | Performed by: HOSPITALIST

## 2021-10-22 PROCEDURE — 3074F SYST BP LT 130 MM HG: CPT | Performed by: HOSPITALIST

## 2021-10-22 PROCEDURE — 99214 OFFICE O/P EST MOD 30 MIN: CPT | Performed by: HOSPITALIST

## 2021-10-22 PROCEDURE — 76000 FLUOROSCOPY <1 HR PHYS/QHP: CPT | Performed by: ORTHOPAEDIC SURGERY

## 2021-10-22 PROCEDURE — 3078F DIAST BP <80 MM HG: CPT | Performed by: HOSPITALIST

## 2021-10-22 PROCEDURE — 0SR904A REPLACEMENT OF RIGHT HIP JOINT WITH CERAMIC ON POLYETHYLENE SYNTHETIC SUBSTITUTE, UNCEMENTED, OPEN APPROACH: ICD-10-PCS | Performed by: ORTHOPAEDIC SURGERY

## 2021-10-22 PROCEDURE — 73502 X-RAY EXAM HIP UNI 2-3 VIEWS: CPT | Performed by: ORTHOPAEDIC SURGERY

## 2021-10-22 DEVICE — MASTERLOC CEMENTLESS TI COATED LAT STEM # 6
Type: IMPLANTABLE DEVICE | Site: HIP | Status: FUNCTIONAL
Brand: MASTERLOC FEMORAL STEMS

## 2021-10-22 DEVICE — HC PE LINER 28 / DME
Type: IMPLANTABLE DEVICE | Site: HIP | Status: FUNCTIONAL
Brand: DOUBLE MOBILITY LINER

## 2021-10-22 RX ORDER — ALBUTEROL SULFATE 90 UG/1
1 AEROSOL, METERED RESPIRATORY (INHALATION) EVERY 6 HOURS PRN
Status: DISCONTINUED | OUTPATIENT
Start: 2021-10-22 | End: 2021-10-24

## 2021-10-22 RX ORDER — FOLIC ACID 1 MG/1
1 TABLET ORAL DAILY
Status: DISCONTINUED | OUTPATIENT
Start: 2021-10-23 | End: 2021-10-24

## 2021-10-22 RX ORDER — SODIUM CHLORIDE 9 MG/ML
INJECTION, SOLUTION INTRAVENOUS CONTINUOUS
Status: DISCONTINUED | OUTPATIENT
Start: 2021-10-22 | End: 2021-10-23

## 2021-10-22 RX ORDER — CALCIUM CARBONATE/VITAMIN D3 250-3.125
1 TABLET ORAL 2 TIMES DAILY WITH MEALS
Status: DISCONTINUED | OUTPATIENT
Start: 2021-10-22 | End: 2021-10-24

## 2021-10-22 RX ORDER — DIPHENHYDRAMINE HYDROCHLORIDE 50 MG/ML
12.5 INJECTION INTRAMUSCULAR; INTRAVENOUS EVERY 4 HOURS PRN
Status: ACTIVE | OUTPATIENT
Start: 2021-10-22 | End: 2021-10-23

## 2021-10-22 RX ORDER — ACETAMINOPHEN 500 MG
1000 TABLET ORAL EVERY 8 HOURS PRN
Status: DISCONTINUED | OUTPATIENT
Start: 2021-10-22 | End: 2021-10-24

## 2021-10-22 RX ORDER — BUPIVACAINE HYDROCHLORIDE 7.5 MG/ML
INJECTION, SOLUTION INTRASPINAL AS NEEDED
Status: DISCONTINUED | OUTPATIENT
Start: 2021-10-22 | End: 2021-10-22 | Stop reason: SURG

## 2021-10-22 RX ORDER — HYDROCODONE BITARTRATE AND ACETAMINOPHEN 5; 325 MG/1; MG/1
2 TABLET ORAL AS NEEDED
Status: DISCONTINUED | OUTPATIENT
Start: 2021-10-22 | End: 2021-10-22 | Stop reason: HOSPADM

## 2021-10-22 RX ORDER — CEFAZOLIN SODIUM/WATER 2 G/20 ML
2 SYRINGE (ML) INTRAVENOUS EVERY 8 HOURS
Status: COMPLETED | OUTPATIENT
Start: 2021-10-22 | End: 2021-10-23

## 2021-10-22 RX ORDER — ONDANSETRON 2 MG/ML
4 INJECTION INTRAMUSCULAR; INTRAVENOUS ONCE AS NEEDED
Status: DISCONTINUED | OUTPATIENT
Start: 2021-10-22 | End: 2021-10-22 | Stop reason: HOSPADM

## 2021-10-22 RX ORDER — HYDROCODONE BITARTRATE AND ACETAMINOPHEN 10; 325 MG/1; MG/1
1 TABLET ORAL EVERY 4 HOURS PRN
Status: DISCONTINUED | OUTPATIENT
Start: 2021-10-22 | End: 2021-10-23

## 2021-10-22 RX ORDER — ACETAMINOPHEN 500 MG
1000 TABLET ORAL ONCE
Status: COMPLETED | OUTPATIENT
Start: 2021-10-22 | End: 2021-10-22

## 2021-10-22 RX ORDER — HYDROMORPHONE HYDROCHLORIDE 1 MG/ML
0.2 INJECTION, SOLUTION INTRAMUSCULAR; INTRAVENOUS; SUBCUTANEOUS EVERY 5 MIN PRN
Status: DISCONTINUED | OUTPATIENT
Start: 2021-10-22 | End: 2021-10-22 | Stop reason: HOSPADM

## 2021-10-22 RX ORDER — MORPHINE SULFATE 4 MG/ML
4 INJECTION, SOLUTION INTRAMUSCULAR; INTRAVENOUS EVERY 10 MIN PRN
Status: DISCONTINUED | OUTPATIENT
Start: 2021-10-22 | End: 2021-10-22 | Stop reason: HOSPADM

## 2021-10-22 RX ORDER — DIPHENHYDRAMINE HYDROCHLORIDE 50 MG/ML
25 INJECTION INTRAMUSCULAR; INTRAVENOUS ONCE AS NEEDED
Status: ACTIVE | OUTPATIENT
Start: 2021-10-22 | End: 2021-10-22

## 2021-10-22 RX ORDER — HYDROCODONE BITARTRATE AND ACETAMINOPHEN 7.5; 325 MG/1; MG/1
2 TABLET ORAL EVERY 6 HOURS PRN
Status: DISPENSED | OUTPATIENT
Start: 2021-10-22 | End: 2021-10-23

## 2021-10-22 RX ORDER — MULTIPLE VITAMINS W/ MINERALS TAB 9MG-400MCG
1 TAB ORAL DAILY
Status: DISCONTINUED | OUTPATIENT
Start: 2021-10-23 | End: 2021-10-24

## 2021-10-22 RX ORDER — SENNOSIDES 8.6 MG
17.2 TABLET ORAL NIGHTLY
Status: DISCONTINUED | OUTPATIENT
Start: 2021-10-22 | End: 2021-10-24

## 2021-10-22 RX ORDER — TRAZODONE HYDROCHLORIDE 100 MG/1
50 TABLET ORAL 2 TIMES DAILY
Status: DISCONTINUED | OUTPATIENT
Start: 2021-10-22 | End: 2021-10-24

## 2021-10-22 RX ORDER — MULTIVIT-MIN/FA/LYCOPEN/LUTEIN .4-300-25
1 TABLET ORAL DAILY
Qty: 30 TABLET | Refills: 0 | Status: SHIPPED | OUTPATIENT
Start: 2021-10-22

## 2021-10-22 RX ORDER — HALOPERIDOL 5 MG/ML
0.25 INJECTION INTRAMUSCULAR ONCE AS NEEDED
Status: DISCONTINUED | OUTPATIENT
Start: 2021-10-22 | End: 2021-10-22 | Stop reason: HOSPADM

## 2021-10-22 RX ORDER — ZIDOVUDINE 300 MG/1
300 TABLET ORAL 2 TIMES DAILY
Status: DISCONTINUED | OUTPATIENT
Start: 2021-10-22 | End: 2021-10-22

## 2021-10-22 RX ORDER — MORPHINE SULFATE 10 MG/ML
6 INJECTION, SOLUTION INTRAMUSCULAR; INTRAVENOUS EVERY 10 MIN PRN
Status: DISCONTINUED | OUTPATIENT
Start: 2021-10-22 | End: 2021-10-22 | Stop reason: HOSPADM

## 2021-10-22 RX ORDER — HYDROMORPHONE HYDROCHLORIDE 1 MG/ML
0.4 INJECTION, SOLUTION INTRAMUSCULAR; INTRAVENOUS; SUBCUTANEOUS
Status: DISPENSED | OUTPATIENT
Start: 2021-10-22 | End: 2021-10-23

## 2021-10-22 RX ORDER — AMLODIPINE BESYLATE 5 MG/1
5 TABLET ORAL DAILY
Status: DISCONTINUED | OUTPATIENT
Start: 2021-10-23 | End: 2021-10-24

## 2021-10-22 RX ORDER — PROCHLORPERAZINE EDISYLATE 5 MG/ML
10 INJECTION INTRAMUSCULAR; INTRAVENOUS EVERY 6 HOURS PRN
Status: DISCONTINUED | OUTPATIENT
Start: 2021-10-22 | End: 2021-10-24

## 2021-10-22 RX ORDER — ONDANSETRON 2 MG/ML
INJECTION INTRAMUSCULAR; INTRAVENOUS AS NEEDED
Status: DISCONTINUED | OUTPATIENT
Start: 2021-10-22 | End: 2021-10-22 | Stop reason: SURG

## 2021-10-22 RX ORDER — DIPHENHYDRAMINE HYDROCHLORIDE 50 MG/ML
12.5 INJECTION INTRAMUSCULAR; INTRAVENOUS EVERY 4 HOURS PRN
Status: DISCONTINUED | OUTPATIENT
Start: 2021-10-22 | End: 2021-10-24

## 2021-10-22 RX ORDER — ATORVASTATIN CALCIUM 10 MG/1
10 TABLET, FILM COATED ORAL NIGHTLY
Status: DISCONTINUED | OUTPATIENT
Start: 2021-10-22 | End: 2021-10-24

## 2021-10-22 RX ORDER — DIPHENHYDRAMINE HCL 25 MG
25 CAPSULE ORAL EVERY 4 HOURS PRN
Status: DISCONTINUED | OUTPATIENT
Start: 2021-10-22 | End: 2021-10-24

## 2021-10-22 RX ORDER — MIDAZOLAM HYDROCHLORIDE 1 MG/ML
INJECTION INTRAMUSCULAR; INTRAVENOUS AS NEEDED
Status: DISCONTINUED | OUTPATIENT
Start: 2021-10-22 | End: 2021-10-22 | Stop reason: SURG

## 2021-10-22 RX ORDER — NALOXONE HYDROCHLORIDE 0.4 MG/ML
80 INJECTION, SOLUTION INTRAMUSCULAR; INTRAVENOUS; SUBCUTANEOUS AS NEEDED
Status: DISCONTINUED | OUTPATIENT
Start: 2021-10-22 | End: 2021-10-22 | Stop reason: HOSPADM

## 2021-10-22 RX ORDER — TRANEXAMIC ACID 10 MG/ML
INJECTION, SOLUTION INTRAVENOUS AS NEEDED
Status: DISCONTINUED | OUTPATIENT
Start: 2021-10-22 | End: 2021-10-22 | Stop reason: SURG

## 2021-10-22 RX ORDER — LIDOCAINE HYDROCHLORIDE 10 MG/ML
INJECTION, SOLUTION EPIDURAL; INFILTRATION; INTRACAUDAL; PERINEURAL AS NEEDED
Status: DISCONTINUED | OUTPATIENT
Start: 2021-10-22 | End: 2021-10-22 | Stop reason: SURG

## 2021-10-22 RX ORDER — SODIUM CHLORIDE, SODIUM LACTATE, POTASSIUM CHLORIDE, CALCIUM CHLORIDE 600; 310; 30; 20 MG/100ML; MG/100ML; MG/100ML; MG/100ML
INJECTION, SOLUTION INTRAVENOUS CONTINUOUS
Status: DISCONTINUED | OUTPATIENT
Start: 2021-10-22 | End: 2021-10-24

## 2021-10-22 RX ORDER — FLUTICASONE PROPIONATE 50 MCG
2 SPRAY, SUSPENSION (ML) NASAL DAILY
Status: DISCONTINUED | OUTPATIENT
Start: 2021-10-23 | End: 2021-10-24

## 2021-10-22 RX ORDER — CLONIDINE HYDROCHLORIDE 0.1 MG/1
0.1 TABLET ORAL DAILY
Status: DISCONTINUED | OUTPATIENT
Start: 2021-10-22 | End: 2021-10-24

## 2021-10-22 RX ORDER — ONDANSETRON 2 MG/ML
4 INJECTION INTRAMUSCULAR; INTRAVENOUS EVERY 4 HOURS PRN
Status: DISCONTINUED | OUTPATIENT
Start: 2021-10-22 | End: 2021-10-24

## 2021-10-22 RX ORDER — TEMAZEPAM 15 MG/1
30 CAPSULE ORAL NIGHTLY
Status: DISCONTINUED | OUTPATIENT
Start: 2021-10-22 | End: 2021-10-24

## 2021-10-22 RX ORDER — HYDROCODONE BITARTRATE AND ACETAMINOPHEN 7.5; 325 MG/1; MG/1
1 TABLET ORAL EVERY 6 HOURS PRN
Status: ACTIVE | OUTPATIENT
Start: 2021-10-22 | End: 2021-10-23

## 2021-10-22 RX ORDER — SERTRALINE HYDROCHLORIDE 100 MG/1
200 TABLET, FILM COATED ORAL DAILY
Status: DISCONTINUED | OUTPATIENT
Start: 2021-10-22 | End: 2021-10-24

## 2021-10-22 RX ORDER — BISACODYL 10 MG
10 SUPPOSITORY, RECTAL RECTAL
Status: DISCONTINUED | OUTPATIENT
Start: 2021-10-22 | End: 2021-10-24

## 2021-10-22 RX ORDER — HYDROMORPHONE HYDROCHLORIDE 1 MG/ML
0.4 INJECTION, SOLUTION INTRAMUSCULAR; INTRAVENOUS; SUBCUTANEOUS EVERY 5 MIN PRN
Status: DISCONTINUED | OUTPATIENT
Start: 2021-10-22 | End: 2021-10-22 | Stop reason: HOSPADM

## 2021-10-22 RX ORDER — ZIDOVUDINE 300 MG/1
300 TABLET ORAL 2 TIMES DAILY
Status: DISCONTINUED | OUTPATIENT
Start: 2021-10-22 | End: 2021-10-24

## 2021-10-22 RX ORDER — CYANOCOBALAMIN (VITAMIN B-12) 1000 MCG
1 TABLET, EXTENDED RELEASE ORAL 2 TIMES DAILY WITH MEALS
Qty: 60 TABLET | Refills: 0 | Status: SHIPPED | OUTPATIENT
Start: 2021-10-22

## 2021-10-22 RX ORDER — DIPHENHYDRAMINE HCL 25 MG
25 CAPSULE ORAL EVERY 4 HOURS PRN
Status: DISPENSED | OUTPATIENT
Start: 2021-10-22 | End: 2021-10-23

## 2021-10-22 RX ORDER — HYDROCODONE BITARTRATE AND ACETAMINOPHEN 10; 325 MG/1; MG/1
1 TABLET ORAL EVERY 6 HOURS PRN
Qty: 30 TABLET | Refills: 0 | Status: SHIPPED | OUTPATIENT
Start: 2021-10-22 | End: 2021-11-03

## 2021-10-22 RX ORDER — LAMIVUDINE 150 MG/1
150 TABLET, FILM COATED ORAL 2 TIMES DAILY
Status: DISCONTINUED | OUTPATIENT
Start: 2021-10-22 | End: 2021-10-23

## 2021-10-22 RX ORDER — HYDROCODONE BITARTRATE AND ACETAMINOPHEN 5; 325 MG/1; MG/1
1 TABLET ORAL AS NEEDED
Status: DISCONTINUED | OUTPATIENT
Start: 2021-10-22 | End: 2021-10-22 | Stop reason: HOSPADM

## 2021-10-22 RX ORDER — PROCHLORPERAZINE EDISYLATE 5 MG/ML
5 INJECTION INTRAMUSCULAR; INTRAVENOUS ONCE AS NEEDED
Status: DISCONTINUED | OUTPATIENT
Start: 2021-10-22 | End: 2021-10-22 | Stop reason: HOSPADM

## 2021-10-22 RX ORDER — LOSARTAN POTASSIUM 25 MG/1
25 TABLET ORAL DAILY
Status: DISCONTINUED | OUTPATIENT
Start: 2021-10-23 | End: 2021-10-24

## 2021-10-22 RX ORDER — SODIUM PHOSPHATE, DIBASIC AND SODIUM PHOSPHATE, MONOBASIC 7; 19 G/133ML; G/133ML
1 ENEMA RECTAL ONCE AS NEEDED
Status: DISCONTINUED | OUTPATIENT
Start: 2021-10-22 | End: 2021-10-24

## 2021-10-22 RX ORDER — MORPHINE SULFATE 1 MG/ML
INJECTION, SOLUTION EPIDURAL; INTRATHECAL; INTRAVENOUS AS NEEDED
Status: DISCONTINUED | OUTPATIENT
Start: 2021-10-22 | End: 2021-10-22 | Stop reason: SURG

## 2021-10-22 RX ORDER — DEXAMETHASONE SODIUM PHOSPHATE 4 MG/ML
VIAL (ML) INJECTION AS NEEDED
Status: DISCONTINUED | OUTPATIENT
Start: 2021-10-22 | End: 2021-10-22 | Stop reason: SURG

## 2021-10-22 RX ORDER — MORPHINE SULFATE 2 MG/ML
2 INJECTION, SOLUTION INTRAMUSCULAR; INTRAVENOUS EVERY 10 MIN PRN
Status: DISCONTINUED | OUTPATIENT
Start: 2021-10-22 | End: 2021-10-22 | Stop reason: HOSPADM

## 2021-10-22 RX ORDER — SODIUM CHLORIDE, SODIUM LACTATE, POTASSIUM CHLORIDE, CALCIUM CHLORIDE 600; 310; 30; 20 MG/100ML; MG/100ML; MG/100ML; MG/100ML
INJECTION, SOLUTION INTRAVENOUS CONTINUOUS
Status: DISCONTINUED | OUTPATIENT
Start: 2021-10-22 | End: 2021-10-23

## 2021-10-22 RX ORDER — MELATONIN
5000 DAILY
Status: DISCONTINUED | OUTPATIENT
Start: 2021-10-23 | End: 2021-10-24

## 2021-10-22 RX ORDER — NALBUPHINE HCL 10 MG/ML
2.5 AMPUL (ML) INJECTION EVERY 4 HOURS PRN
Status: ACTIVE | OUTPATIENT
Start: 2021-10-22 | End: 2021-10-23

## 2021-10-22 RX ORDER — NALOXONE HYDROCHLORIDE 0.4 MG/ML
0.08 INJECTION, SOLUTION INTRAMUSCULAR; INTRAVENOUS; SUBCUTANEOUS
Status: ACTIVE | OUTPATIENT
Start: 2021-10-22 | End: 2021-10-23

## 2021-10-22 RX ORDER — ALLOPURINOL 100 MG/1
100 TABLET ORAL DAILY
Status: DISCONTINUED | OUTPATIENT
Start: 2021-10-23 | End: 2021-10-24

## 2021-10-22 RX ORDER — DOCUSATE SODIUM 100 MG/1
100 CAPSULE, LIQUID FILLED ORAL 2 TIMES DAILY
Status: DISCONTINUED | OUTPATIENT
Start: 2021-10-22 | End: 2021-10-24

## 2021-10-22 RX ORDER — HYDROMORPHONE HYDROCHLORIDE 1 MG/ML
0.6 INJECTION, SOLUTION INTRAMUSCULAR; INTRAVENOUS; SUBCUTANEOUS EVERY 5 MIN PRN
Status: DISCONTINUED | OUTPATIENT
Start: 2021-10-22 | End: 2021-10-22 | Stop reason: HOSPADM

## 2021-10-22 RX ORDER — PROCHLORPERAZINE EDISYLATE 5 MG/ML
5 INJECTION INTRAMUSCULAR; INTRAVENOUS ONCE AS NEEDED
Status: ACTIVE | OUTPATIENT
Start: 2021-10-22 | End: 2021-10-22

## 2021-10-22 RX ORDER — MULTIVIT-MIN/FA/LYCOPEN/LUTEIN .4-300-25
1 TABLET ORAL DAILY
Status: DISCONTINUED | OUTPATIENT
Start: 2021-10-22 | End: 2021-10-22

## 2021-10-22 RX ORDER — ACETAMINOPHEN 325 MG/1
650 TABLET ORAL EVERY 6 HOURS PRN
Status: ACTIVE | OUTPATIENT
Start: 2021-10-22 | End: 2021-10-23

## 2021-10-22 RX ORDER — ONDANSETRON 2 MG/ML
4 INJECTION INTRAMUSCULAR; INTRAVENOUS ONCE AS NEEDED
Status: ACTIVE | OUTPATIENT
Start: 2021-10-22 | End: 2021-10-22

## 2021-10-22 RX ORDER — CEFAZOLIN SODIUM/WATER 2 G/20 ML
2 SYRINGE (ML) INTRAVENOUS ONCE
Status: COMPLETED | OUTPATIENT
Start: 2021-10-22 | End: 2021-10-22

## 2021-10-22 RX ORDER — HALOPERIDOL 5 MG/ML
0.5 INJECTION INTRAMUSCULAR ONCE AS NEEDED
Status: ACTIVE | OUTPATIENT
Start: 2021-10-22 | End: 2021-10-22

## 2021-10-22 RX ORDER — POLYETHYLENE GLYCOL 3350 17 G/17G
17 POWDER, FOR SOLUTION ORAL DAILY PRN
Status: DISCONTINUED | OUTPATIENT
Start: 2021-10-22 | End: 2021-10-24

## 2021-10-22 RX ORDER — HYDROMORPHONE HYDROCHLORIDE 1 MG/ML
0.6 INJECTION, SOLUTION INTRAMUSCULAR; INTRAVENOUS; SUBCUTANEOUS
Status: ACTIVE | OUTPATIENT
Start: 2021-10-22 | End: 2021-10-23

## 2021-10-22 RX ADMIN — CEFAZOLIN SODIUM/WATER 2 G: 2 G/20 ML SYRINGE (ML) INTRAVENOUS at 11:01:00

## 2021-10-22 RX ADMIN — TRANEXAMIC ACID 1000 MG: 10 INJECTION, SOLUTION INTRAVENOUS at 10:48:00

## 2021-10-22 RX ADMIN — MORPHINE SULFATE 0.3 MG: 1 INJECTION, SOLUTION EPIDURAL; INTRATHECAL; INTRAVENOUS at 10:41:00

## 2021-10-22 RX ADMIN — BUPIVACAINE HYDROCHLORIDE 1.5 ML: 7.5 INJECTION, SOLUTION INTRASPINAL at 10:41:00

## 2021-10-22 RX ADMIN — TRANEXAMIC ACID 1000 MG: 10 INJECTION, SOLUTION INTRAVENOUS at 13:16:00

## 2021-10-22 RX ADMIN — LIDOCAINE HYDROCHLORIDE 30 MG: 10 INJECTION, SOLUTION EPIDURAL; INFILTRATION; INTRACAUDAL; PERINEURAL at 10:41:00

## 2021-10-22 RX ADMIN — DEXAMETHASONE SODIUM PHOSPHATE 4 MG: 4 MG/ML VIAL (ML) INJECTION at 10:42:00

## 2021-10-22 RX ADMIN — SODIUM CHLORIDE, SODIUM LACTATE, POTASSIUM CHLORIDE, CALCIUM CHLORIDE: 600; 310; 30; 20 INJECTION, SOLUTION INTRAVENOUS at 13:32:00

## 2021-10-22 RX ADMIN — ONDANSETRON 4 MG: 2 INJECTION INTRAMUSCULAR; INTRAVENOUS at 10:42:00

## 2021-10-22 RX ADMIN — MIDAZOLAM HYDROCHLORIDE 2 MG: 1 INJECTION INTRAMUSCULAR; INTRAVENOUS at 10:39:00

## 2021-10-22 NOTE — BRIEF OP NOTE
Pre-Operative Diagnosis: Avascular necrosis of hip, right (HCC) [M87.051]     Post-Operative Diagnosis: Avascular necrosis of hip, right ([address] [M87.051]      Procedure Performed:   RIGHT ANTERIOR TOTAL HIP ARTHROPLASTY, FLOUROSCOPY    Surgeon(s) and Role:

## 2021-10-22 NOTE — ANESTHESIA POSTPROCEDURE EVALUATION
Patient: Jesus Page    Procedure Summary     Date: 10/22/21 Room / Location: [address] [place] [address] MAIN OR 05 / [address] [place] [address] MAIN OR    Anesthesia Start: 2374 Anesthesia Stop: 3488    Procedure: RIGHT ANTERIOR TOTAL HIP ARTHROPLASTY, FLOUROSCOPY (Right ) Diagnosis:       Avascular

## 2021-10-22 NOTE — PHYSICAL THERAPY NOTE
PHYSICAL THERAPY HIP EVALUATION - INPATIENT     Room Number: Room 1/Room 1-A  Evaluation Date: 10/22/2021  Type of Evaluation: Initial  Physician Order: PT Eval and Treat    Presenting Problem: s/p R THR (anterior approach)  by Dr. Sylvia Guillaume  Reason for The Good  Frequency (Obs): BID       PHYSICAL THERAPY MEDICAL/SOCIAL HISTORY     History related to current admission:  s/p R THR (anterior approach)  by Dr. Donal Mcfarlane    Problem List  Principal Problem:    Osteonecrosis of right hip [facility]  Active Problems: promotion;Relaxation;Repositioning    COGNITION  · Overall Cognitive Status:  WFL - within functional limits    RANGE OF MOTION AND STRENGTH ASSESSMENT  Upper extremity ROM and strength are within functional limits   Lower extremity ROM is within functiona able to demonstrate supine - sit EOB @ level: modified independent   Goal #1   Current Status    Goal #2 Patient is able to demonstrate transfers Sit to/from Stand at assistance level: modified independent     Goal #2  Current Status    Goal #3 Patient is

## 2021-10-22 NOTE — ANESTHESIA PROCEDURE NOTES
Spinal Block  Performed by: Tammy Milner CRNA  Authorized by: David Linares MD       General Information and Staff    Start Time:  10/22/2021 10:39 AM  End Time:  10/22/2021 10:41 AM  Anesthesiologist:  David Linares MD  CRNA:  ORION Estrella

## 2021-10-22 NOTE — INTERVAL H&P NOTE
Pre-op Diagnosis: Avascular necrosis of hip, right ([address] [M87.051]    The above referenced H&P was reviewed by Lina Noble MD on 10/22/2021, the patient was examined and no significant changes have occurred in the patient's condition since the H&P was

## 2021-10-22 NOTE — ANESTHESIA PREPROCEDURE EVALUATION
Anesthesia PreOp Note    HPI:     Larene Hodgkins is a 62year old male who presents for preoperative consultation requested by: Raymond Sloan MD    Date of Surgery: 10/22/2021    Procedure(s):  RIGHT ANTERIOR TOTAL HIP ARTHROPLASTY, FLOUROSCOPY  Indicati hyperlipidemia         Date Noted: 02/15/2012      Stage 4 chronic kidney disease ([facility]         Date Noted: 10/02/2009      HIV (human immunodeficiency virus infection) ([facility]         Date Noted: 10/02/2009        Past Medical History:   Diagnosis Date   • TABLET(10 MG) BY MOUTH EVERY NIGHT, Disp: 90 tablet, Rfl: 3, 10/21/2021 at Unknown time  amLODIPine Besylate 5 MG Oral Tab, Take 5 mg by mouth daily. , Disp: , Rfl: , 10/21/2021 at Unknown time  Albuterol Sulfate  (90 Base) MCG/ACT Inhalation Aero So Shefali Mathur MD  clonidine/epinephrine/ropivacaine/ketorolac (CERTS) pain cocktail, , Intra-articular, Once (Intra-Op), Shefali Mathur MD    No current [facility]-ordered outpatient medications on file.       No Known Allergies    Family History   Problem Communication with Friends and Family: Not on file      Frequency of Social Gatherings with Friends and Family: Not on file      Attends Worship Services: Not on file      Active Member of Clubs or Organizations: Not on file      Attends Club or [place] patient's questions were answered to the best of my ability. The patient desires the anesthetic management as planned.   Amy Cotto MD  10/22/2021 9:54 AM

## 2021-10-22 NOTE — PROGRESS NOTES
[facility]    Progress Note    Maia Harvey Patient Status:  Outpatient in a Bed    12/10/1960 MRN [id number]   Location [address] [facility] Attending Sang Toledo MD   Hosp Day # 0 PCP BRYAN Hatch CREATSERUM 2.95 (H) 05/26/2021    BUN 43 (H) 05/26/2021     05/26/2021    K 4.2 05/26/2021     (H) 05/26/2021    CO2 22.0 05/26/2021     (H) 05/26/2021    CA 9.3 05/26/2021    ALB 3.5 05/26/2021    ALKPHO 96 05/26/2021    BILT 0.3 05/26/

## 2021-10-22 NOTE — CM/SW NOTE
10/22/21 1600   CM/SW Referral Data   Referral Source Physician   Reason for Referral Discharge planning   Pertinent Medical Hx   Does patient have an established PCP?    Sweta Infante   Patient Info   Patient's Current Mental Status at Time of As

## 2021-10-23 PROCEDURE — 99214 OFFICE O/P EST MOD 30 MIN: CPT | Performed by: HOSPITALIST

## 2021-10-23 RX ORDER — LAMIVUDINE 150 MG/1
150 TABLET, FILM COATED ORAL DAILY
Status: DISCONTINUED | OUTPATIENT
Start: 2021-10-24 | End: 2021-10-24

## 2021-10-23 RX ORDER — HYDROCODONE BITARTRATE AND ACETAMINOPHEN 10; 325 MG/1; MG/1
2 TABLET ORAL EVERY 6 HOURS PRN
Status: DISCONTINUED | OUTPATIENT
Start: 2021-10-23 | End: 2021-10-24

## 2021-10-23 RX ORDER — HYDROCODONE BITARTRATE AND ACETAMINOPHEN 10; 325 MG/1; MG/1
1 TABLET ORAL EVERY 4 HOURS PRN
Status: DISCONTINUED | OUTPATIENT
Start: 2021-10-23 | End: 2021-10-24

## 2021-10-23 RX ORDER — CHLORPROMAZINE HYDROCHLORIDE 25 MG/1
25 TABLET, FILM COATED ORAL 3 TIMES DAILY PRN
Status: DISCONTINUED | OUTPATIENT
Start: 2021-10-23 | End: 2021-10-24

## 2021-10-23 NOTE — PLAN OF CARE
Problem: SKIN/TISSUE INTEGRITY - ADULT  Goal: Incision(s), wounds(s) or drain site(s) healing without S/S of infection  Description: INTERVENTIONS:  - Assess and document risk factors for pressure ulcer development  - Assess and document skin integrity coordinating discharge planning if the patient needs post-hospital services based on physician/LIP order or complex needs related to functional status, cognitive ability or social support system  Outcome: Progressing     Problem: Patient/Family Goals  Goal

## 2021-10-23 NOTE — OCCUPATIONAL THERAPY NOTE
OT order rec'd and chart reviewed; Nurse Joao Celeste authorized Pt participation. Pt s/p R SHERRY anterior approach; had L SHERRY ~3 mos ago.   Pt stated that he felt comfortable with ADLS after R SHERRY, he has been getting up in room and ambulating to bathroom; he de

## 2021-10-23 NOTE — PHYSICAL THERAPY NOTE
PM session:   Pt seen supine in bed , Agreeable to therapy. RN came in to disconnect IV. Pt supine to sit with SBA. Sit to stand with SBA. Pt ambulated with RW  A total of 300 ft with SBA.  Pt navigated 2 flights of stairs, 24 steps total to mimic the 25 st

## 2021-10-23 NOTE — PHYSICAL THERAPY NOTE
PHYSICAL THERAPY HIP TREATMENT NOTE - INPATIENT    Room Number: 429/429-A            Presenting Problem: s/p R THR (anterior approach) by Dr. Herminia Law    Problem List  Principal Problem:    Osteonecrosis of right hip [facility])  Active Problems:    Stage 4  the patient currently have. ..  -   Turning over in bed (including adjusting bedclothes, sheets and blankets)?: A Little   -   Sitting down on and standing up from a chair with arms (e.g., wheelchair, bedside commode, etc.): A Little   -   Moving from lying Patient will negotiate 15 stairs/one curb w/ assistive device and supervision   Goal #4   Current Status    Goal #5 Patient verbalizes and/or demonstrates all precautions and safety concerns independently   Goal #5   Current Status    Goal #6 Patient indep

## 2021-10-23 NOTE — PROGRESS NOTES
[facility]     Hospitalist Progress Note     Yojana Collazo Patient Status:  Observation    12/10/1960 MRN [id number]   Location [facility] 4W/SW/SE Attending Homero Martinez MD   Hosp Day # 0 PCP Roxanna Issa MD     Chief C last 168 hours. Culture:  No results found for this visit on 10/22/21.     COVID-19 Lab Results    COVID-19  Lab Results   Component Value Date    COVID19 Not Detected 10/19/2021    COVID19 Not Detected 06/16/2021    COVID19 Not Detected 03/07/2021 with home health  - Orthopedic surgery following.      CKD IV  - stable creat at baseline 2.9-3.2   - stop IVF    HIV  - cont home meds    Hyperlipidemia  - cont home lipitor     HTN  - cont amlodipine, clonidine and losartan     GUSTAVO  - CPAP at bedtime

## 2021-10-23 NOTE — PROGRESS NOTES
[facility]    Progress Note    Barbara Winters Patient Status:  Outpatient in a Bed    12/10/1960 MRN [id number]   Location [facility] Attending Manfred Basilio MD   Hosp Day # 0 PCP Mariusz Morrison MD        Subjective:   Khalif Higgins Value Date    WBC 5.7 05/26/2021    HGB 8.3 (L) 06/19/2021    HCT 25.4 (L) 06/19/2021    .0 05/26/2021    CREATSERUM 2.95 (H) 05/26/2021    BUN 43 (H) 05/26/2021     05/26/2021    K 4.2 05/26/2021     (H) 05/26/2021    CO2 22.0 05/26/202

## 2021-10-23 NOTE — OPERATIVE REPORT
[facility]    PATIENT'S NAME: NATALIE MATTHEW   ATTENDING PHYSICIAN: Lakhwinder Garcia MD   OPERATING PHYSICIAN: Chevy Garcia MD   PATIENT ACCOUNT#:   [id number]    LOCATION:   ROOM 1 A [facility]  MEDICAL RECORD #:   [id number]       DATE OF BIRTH: vancomycin 1 g IV were both given as a prophylaxis on request of his infectious disease doctor. He was also given tranexamic acid 1 g IV at the appropriate interval preoperatively. Spinal using Duramorph was done by Dr. Nicola Sharif.   The patient was then yuko and proper external rotation was noted. Trial reduction with a 0 mm femoral neck and a lateral offset to match the other side was then trialed. Length gain was of about 5 mm and the offset was equal to the preoperative film.   The hip was then stable to i were no complications, and the patient tolerated the procedure well. Dictated By María Elena Seth.  Ana Laura Mcnally MD  d: 10/22/2021 13:30:07  t: 10/22/2021 21:08:46  [facility] [id number]  [facility]/    cc: Jose Painter MD

## 2021-10-23 NOTE — ANESTHESIA POST-OP FOLLOW-UP NOTE
Swati Chao is POD#1 after   Surgical Procedures     Case IDs Date Procedure Surgeon Location Status    [id number] 10/22/21 RIGHT ANTERIOR TOTAL HIP ARTHROPLASTY, FLOUROSCOPY Amie Shin MD [facility] OR [facility]      .  Swati Chao underwent spinal anesthesi

## 2021-10-24 VITALS
OXYGEN SATURATION: 97 % | HEART RATE: 87 BPM | BODY MASS INDEX: 22.22 KG/M2 | DIASTOLIC BLOOD PRESSURE: 74 MMHG | SYSTOLIC BLOOD PRESSURE: 122 MMHG | WEIGHT: 150 LBS | HEIGHT: 69 IN | TEMPERATURE: 98 F | RESPIRATION RATE: 18 BRPM

## 2021-10-24 PROCEDURE — 99214 OFFICE O/P EST MOD 30 MIN: CPT | Performed by: HOSPITALIST

## 2021-10-24 NOTE — PROGRESS NOTES
[facility]    Progress Note    Mary Anne Landa Patient Status:  Observation    12/10/1960 MRN [id number]   Location [facility] 4W/SW/SE Attending Briseyda Velasquez MD   Hosp Day # 0 PCP John Villalobos MD        Subjective:   Dmitry Alan 10/23/2021    .0 05/26/2021    CREATSERUM 3.21 (H) 10/23/2021    BUN 56 (H) 10/23/2021     10/23/2021    K 5.0 10/23/2021     10/23/2021    CO2 22.0 10/23/2021     (H) 10/23/2021    CA 8.8 10/23/2021    ALB 3.5 05/26/2021    ALKPH

## 2021-10-24 NOTE — PLAN OF CARE
Patient is alert and oriented. RA. Tele in place. Voiding freely. Up independently with walker. Tolerating general diet. PRN norco given for pain management. Dressing to right hip intact. Call light within reach.   Problem: SKIN/TISSUE INTEGRITY - ADULT  Go preferences of patient/family/discharge partner  - Complete POLST form as appropriate  - Assess patient's ability to be responsible for managing their own health  - Refer to Case Management Department for coordinating discharge planning if the patient need

## 2021-10-24 NOTE — PLAN OF CARE
Pod 2 r thr anterior-cms inact. Miguel diet. Fall prec in place-safety intact. Drsg changed to right hip-see integ assessment-no s/s infection. Pain control with norco and ice application. Home with hhc and partner.  Refer to dc summary  Problem: SKIN/TISSUE I discharge as needed  - Consider post-discharge preferences of patient/family/discharge partner  - Complete POLST form as appropriate  - Assess patient's ability to be responsible for managing their own health  - Refer to Case Management Department for coor

## 2021-10-24 NOTE — DISCHARGE SUMMARY
[facility] Hospitalist Discharge Summary   Patient ID:  Angela Soni  [id number]  22 year old  12/10/1960    Admit date: 10/22/2021  Discharge date: 10/24/2021  Primary Care Physician: Bijan Higgins MD   Attending Physician: Pam Yeager MD   Consu Medication List      START taking these medications    apixaban 2.5 MG Tabs  Commonly known as: ELIQUIS  Take 1 tablet (2.5 mg total) by mouth 2 (two) times daily.  For DVT prophylaxis for 30 days post-op  Notes to patient: ALERT: take this medication as MG Tabs  Commonly known as: DESYREL  TAKE 1 TABLET(50 MG) BY MOUTH TWICE DAILY     zidovudine 300 MG Tabs  Commonly known as: RETROVIR        STOP taking these medications    aspirin 325 MG Tbec     naproxen 500 MG Tabs  Commonly known as: NAPROSYN Time Coordinating Care: Greater than 30 minutes    Patient had opportunity to ask questions, state understanding, and agree with therapeutic plan as outlined    Felipe Gudino MD  Hospitalist  10/24/2021

## 2021-10-25 NOTE — CM/SW NOTE
Patient discharged without an accepting [address] [place] [address] Eduard Martinez provider. A referral was sent to [place] [id number], [place] [phone], they are unable to accept, [facility]. Morales Gramajo / Alpesh Roman will f/ u with referrals for accepting [place] [phone] agency.     SW/CM to remain available fo

## 2021-10-26 NOTE — CM/SW NOTE
10/26/21 0900   Discharge disposition   Expected discharge disposition Home-Health   Post Acute Care Provider Other  ([facility])     PARMINDER received call from Arriba Shawna at [facility]. [facility] is able to accept pt for Home PT. [facility] reserved [address].  PARMINDER called to

## 2021-10-27 ENCOUNTER — TELEPHONE (OUTPATIENT)
Dept: ORTHOPEDICS CLINIC | Facility: CLINIC | Age: 61
End: 2021-10-27

## 2021-10-27 NOTE — TELEPHONE ENCOUNTER
Called Julienne from [facility] called stating she rc'd electronic request for pt from [facility] on 10/26 and needing to verify.  Upon looking through chart it appears that pt is receiving [facility] services from [facility] and Informed her she should disregard this orde

## 2021-10-27 NOTE — TELEPHONE ENCOUNTER
Josey Bush needs to confirm pts diagnosis for home health, states diagnosis on referral is not valid. Please call thank you.

## 2021-11-03 RX ORDER — HYDROCODONE BITARTRATE AND ACETAMINOPHEN 10; 325 MG/1; MG/1
1 TABLET ORAL EVERY 6 HOURS PRN
Qty: 30 TABLET | Refills: 0 | Status: SHIPPED | OUTPATIENT
Start: 2021-11-03

## 2021-11-03 NOTE — TELEPHONE ENCOUNTER
Pt calling asking for a refill on the following medication please advise       HYDROcodone-acetaminophen

## 2021-11-08 NOTE — TELEPHONE ENCOUNTER
Called pharmacy who stated patient never picked up this medication. I attempted to call patient but was not able to contact him.   I am assuming he did not pick it up due to cost.  But from what I can tell this means patient has not been any type of blood

## 2021-11-09 ENCOUNTER — OFFICE VISIT (OUTPATIENT)
Dept: ORTHOPEDICS CLINIC | Facility: CLINIC | Age: 61
End: 2021-11-09
Payer: MEDICAID

## 2021-11-09 ENCOUNTER — HOSPITAL ENCOUNTER (OUTPATIENT)
Dept: GENERAL RADIOLOGY | Facility: HOSPITAL | Age: 61
Discharge: HOME | End: 2021-11-09
Attending: ORTHOPAEDIC SURGERY
Payer: MEDICAID

## 2021-11-09 DIAGNOSIS — Z47.89 ORTHOPEDIC AFTERCARE: ICD-10-CM

## 2021-11-09 DIAGNOSIS — M87.051 AVASCULAR NECROSIS OF HIP, RIGHT (HCC): Primary | ICD-10-CM

## 2021-11-09 DIAGNOSIS — Z96.641 HISTORY OF TOTAL RIGHT HIP REPLACEMENT: ICD-10-CM

## 2021-11-09 PROCEDURE — 99024 POSTOP FOLLOW-UP VISIT: CPT | Performed by: ORTHOPAEDIC SURGERY

## 2021-11-09 PROCEDURE — 73502 X-RAY EXAM HIP UNI 2-3 VIEWS: CPT | Performed by: ORTHOPAEDIC SURGERY

## 2021-11-09 NOTE — PROGRESS NOTES
NURSING INTAKE COMMENTS: Patient presents with:  Post-Op: status post Right SHERRY on 10/22. Pt rates pain 2 out of 10. Pt denies any numbness or tingling. Pt taking hydrocodone for pain, stating it helps him. Pt did state he did not take eliquis.       HPI: Take 1 tablet by mouth daily. 30 tablet 0   • apixaban 2.5 MG Oral Tab Take 1 tablet (2.5 mg total) by mouth 2 (two) times daily.  For DVT prophylaxis for 30 days post-op 60 tablet 0   • TEMAZEPAM 30 MG Oral Cap TAKE 1 CAPSULE(30 MG) BY MOUTH DAILY 30 capsu years: 10        Types: Cigarettes        Quit date: 1/23/2011        Years since quitting: 10.8      Smokeless tobacco: Never Used    Vaping Use      Vaping Use: Never used    Substance and Sexual Activity      Alcohol use: Yes        Comment: once a week x-rays. No fractures or lytic lesions or loosening were noted.     XR HIP W OR WO PELVIS 2 OR 3 VIEWS, RIGHT (CPT=73502)    Result Date: 10/22/2021  PROCEDURE: XR HIP W OR WO PELVIS 2 OR 3 VIEWS, RIGHT (CPT=73502)  COMPARISON: [facility] for 9.1 (L) 10/23/2021    .0 05/26/2021      Lab Results   Component Value Date     (H) 10/23/2021    BUN 56 (H) 10/23/2021    CREATSERUM 3.21 (H) 10/23/2021    GFRNAA 20 (L) 10/23/2021    GFRAA 23 (L) 10/23/2021        Assessment and Plan:  [place]

## 2021-11-16 RX ORDER — TRAZODONE HYDROCHLORIDE 50 MG/1
TABLET ORAL
Qty: 60 TABLET | Refills: 11 | Status: SHIPPED | OUTPATIENT
Start: 2021-11-16

## 2021-11-16 RX ORDER — ATORVASTATIN CALCIUM 10 MG/1
TABLET, FILM COATED ORAL
Qty: 30 TABLET | Refills: 11 | Status: SHIPPED | OUTPATIENT
Start: 2021-11-16

## 2021-11-16 NOTE — TELEPHONE ENCOUNTER
Pt has tolerated trazodone/sertraline since 2017  Refill request is for a maintenance medication and has met the criteria specified in the Ambulatory Medication Refill Standing Order for eligibility, visits, laboratory, alerts and was sent to the requested

## 2021-11-23 NOTE — TELEPHONE ENCOUNTER
rx request for Eliquis 2.5 mg, please review and sign off if appropriate. Thank you. Last seen: 11/9/21  Last refill: 11/9/21 #30 with 0 refills.

## 2021-12-08 ENCOUNTER — TELEPHONE (OUTPATIENT)
Dept: INTERNAL MEDICINE CLINIC | Facility: CLINIC | Age: 61
End: 2021-12-08

## 2021-12-08 ENCOUNTER — HOSPITAL ENCOUNTER (EMERGENCY)
Facility: HOSPITAL | Age: 61
Discharge: HOME | End: 2021-12-08
Attending: EMERGENCY MEDICINE
Payer: MEDICAID

## 2021-12-08 VITALS
DIASTOLIC BLOOD PRESSURE: 87 MMHG | BODY MASS INDEX: 22.73 KG/M2 | HEIGHT: 68 IN | HEART RATE: 92 BPM | RESPIRATION RATE: 24 BRPM | WEIGHT: 150 LBS | TEMPERATURE: 100 F | OXYGEN SATURATION: 96 % | SYSTOLIC BLOOD PRESSURE: 153 MMHG

## 2021-12-08 DIAGNOSIS — U07.1 COVID-19: Primary | ICD-10-CM

## 2021-12-08 PROCEDURE — 99284 EMERGENCY DEPT VISIT MOD MDM: CPT

## 2021-12-08 RX ORDER — TRAZODONE HYDROCHLORIDE 50 MG/1
50 TABLET ORAL ONCE
Status: COMPLETED | OUTPATIENT
Start: 2021-12-08 | End: 2021-12-08

## 2021-12-08 NOTE — TELEPHONE ENCOUNTER
Pt reports testing +COVID today after developing chills and loss of sense of smell and taste 1 week ago. Denies fever, sore throat, ear pain, body aches, nasal congestion or sinus pressure.  Pt audible coughed during the call and states he is having mild SO

## 2021-12-08 NOTE — TELEPHONE ENCOUNTER
Called to inform Dr Gu Asper he tested positive for Covid today  Pt had a cold, lost sense of taste/smell so he got tested    He has Stage 4 - kidney disease & HIV   Requests call back to advise on next steps    [phone]

## 2021-12-09 NOTE — ED INITIAL ASSESSMENT (HPI)
Patient with positive Covid test today. Here due to feeling labored with exertion.  Speaking in full sentences at rest.

## 2021-12-09 NOTE — TELEPHONE ENCOUNTER
Per record review, patient was seen at [facility] ER and discharged. Per discharge instructions, patient is to Call PCP office in 2 days As needed.  Pt to f/u as instructed, prn.

## 2021-12-09 NOTE — ED QUICK NOTES
Patient left room with doctor clearance with IV still in arm. This RN called Patient and instructed him to come back so IV can be removed. Pt agreeable to come back.

## 2021-12-10 ENCOUNTER — TELEPHONE (OUTPATIENT)
Dept: INTERNAL MEDICINE CLINIC | Facility: CLINIC | Age: 61
End: 2021-12-10

## 2021-12-13 ENCOUNTER — TELEPHONE (OUTPATIENT)
Dept: INTERNAL MEDICINE CLINIC | Facility: CLINIC | Age: 61
End: 2021-12-13

## 2021-12-13 RX ORDER — SERTRALINE HYDROCHLORIDE 100 MG/1
TABLET, FILM COATED ORAL
Qty: 180 TABLET | Refills: 3 | Status: SHIPPED | OUTPATIENT
Start: 2021-12-13

## 2021-12-13 NOTE — TELEPHONE ENCOUNTER
Pt received PAB infusion at [address] [place] [address] on 12/08 for COVID-19. Please follow-up with pt for post-infusion assessment and home monitoring if needed. Thank you.

## 2021-12-14 NOTE — TELEPHONE ENCOUNTER
Telephone call to patient and situation discussed. Patient patient feels he is doing much better. Patient is appreciative of the fact that our office sent him to the emergency room for evaluation and he receive the monoclonal antibody.   Patient feels yecenia

## 2022-03-21 ENCOUNTER — HOSPITAL ENCOUNTER (OUTPATIENT)
Dept: GENERAL RADIOLOGY | Facility: HOSPITAL | Age: 62
Discharge: HOME | End: 2022-03-21
Attending: ORTHOPAEDIC SURGERY
Payer: MEDICAID

## 2022-03-21 ENCOUNTER — OFFICE VISIT (OUTPATIENT)
Dept: ORTHOPEDICS CLINIC | Facility: CLINIC | Age: 62
End: 2022-03-21
Payer: MEDICAID

## 2022-03-21 VITALS — BODY MASS INDEX: 22.73 KG/M2 | WEIGHT: 150 LBS | HEIGHT: 68 IN

## 2022-03-21 DIAGNOSIS — Z96.641 HISTORY OF TOTAL RIGHT HIP REPLACEMENT: ICD-10-CM

## 2022-03-21 DIAGNOSIS — Z96.642 S/P HIP REPLACEMENT, LEFT: ICD-10-CM

## 2022-03-21 DIAGNOSIS — M87.051 AVASCULAR NECROSIS OF HIP, RIGHT (HCC): ICD-10-CM

## 2022-03-21 DIAGNOSIS — M25.551 RIGHT HIP PAIN: ICD-10-CM

## 2022-03-21 DIAGNOSIS — M87.051 AVASCULAR NECROSIS OF HIP, RIGHT (HCC): Primary | ICD-10-CM

## 2022-03-21 DIAGNOSIS — M87.052 AVASCULAR NECROSIS OF BONE OF LEFT HIP (HCC): ICD-10-CM

## 2022-03-21 PROCEDURE — 3008F BODY MASS INDEX DOCD: CPT | Performed by: ORTHOPAEDIC SURGERY

## 2022-03-21 PROCEDURE — 99213 OFFICE O/P EST LOW 20 MIN: CPT | Performed by: ORTHOPAEDIC SURGERY

## 2022-03-21 PROCEDURE — 73523 X-RAY EXAM HIPS BI 5/> VIEWS: CPT | Performed by: ORTHOPAEDIC SURGERY

## 2022-04-10 ENCOUNTER — TELEPHONE (OUTPATIENT)
Dept: INTERNAL MEDICINE CLINIC | Facility: CLINIC | Age: 62
End: 2022-04-10

## 2022-04-11 NOTE — TELEPHONE ENCOUNTER
To Dr. Sheffield Goes----    The above refill request is for a controlled substance. Please review pended medication order.       lov 10/1/21  Prescribed: #30 with 5 10/22/21  : 3/8/22 #30

## 2022-04-13 RX ORDER — TEMAZEPAM 30 MG/1
CAPSULE ORAL
Qty: 30 CAPSULE | Refills: 5 | Status: SHIPPED | OUTPATIENT
Start: 2022-04-13

## 2022-04-13 NOTE — TELEPHONE ENCOUNTER
To Dr. Breezy Castellon, please review pended refill for temazepam 30 mg. This was last refilled by Dr. Alfrieda Nageotte on 10/11/21 for #30 with 5 additional refills.  Please advise if this can be refilled today per patient's request.

## 2022-04-13 NOTE — TELEPHONE ENCOUNTER
Pt called for status on refill  Pt is out of medication  Hoping to  today  Please call patient with status  Tasked to [employer]

## 2022-08-08 ENCOUNTER — OFFICE VISIT (OUTPATIENT)
Dept: INTERNAL MEDICINE CLINIC | Facility: CLINIC | Age: 62
End: 2022-08-08
Payer: MEDICAID

## 2022-08-08 VITALS
DIASTOLIC BLOOD PRESSURE: 70 MMHG | OXYGEN SATURATION: 99 % | HEART RATE: 76 BPM | SYSTOLIC BLOOD PRESSURE: 140 MMHG | HEIGHT: 68 IN | BODY MASS INDEX: 23.04 KG/M2 | TEMPERATURE: 98 F | WEIGHT: 152 LBS

## 2022-08-08 DIAGNOSIS — E78.1 HYPERTRIGLYCERIDEMIA: ICD-10-CM

## 2022-08-08 DIAGNOSIS — N18.4 STAGE 4 CHRONIC KIDNEY DISEASE (HCC): ICD-10-CM

## 2022-08-08 DIAGNOSIS — Z96.643 HISTORY OF BILATERAL HIP REPLACEMENTS: ICD-10-CM

## 2022-08-08 DIAGNOSIS — M15.9 PRIMARY OSTEOARTHRITIS INVOLVING MULTIPLE JOINTS: ICD-10-CM

## 2022-08-08 DIAGNOSIS — E78.00 HYPERCHOLESTEROLEMIA: ICD-10-CM

## 2022-08-08 DIAGNOSIS — Z12.5 PROSTATE CANCER SCREENING: ICD-10-CM

## 2022-08-08 DIAGNOSIS — D63.1 ANEMIA DUE TO STAGE 4 CHRONIC KIDNEY DISEASE (HCC): ICD-10-CM

## 2022-08-08 DIAGNOSIS — I10 ESSENTIAL HYPERTENSION: Primary | ICD-10-CM

## 2022-08-08 DIAGNOSIS — J43.9 PULMONARY EMPHYSEMA, UNSPECIFIED EMPHYSEMA TYPE (HCC): ICD-10-CM

## 2022-08-08 DIAGNOSIS — R73.01 ABNORMAL FASTING GLUCOSE: ICD-10-CM

## 2022-08-08 DIAGNOSIS — Z00.00 ANNUAL PHYSICAL EXAM: ICD-10-CM

## 2022-08-08 DIAGNOSIS — R53.83 FATIGUE, UNSPECIFIED TYPE: ICD-10-CM

## 2022-08-08 DIAGNOSIS — B20 HUMAN IMMUNODEFICIENCY VIRUS (HIV) DISEASE (HCC): ICD-10-CM

## 2022-08-08 DIAGNOSIS — I35.1 AORTIC VALVE INSUFFICIENCY, ETIOLOGY OF CARDIAC VALVE DISEASE UNSPECIFIED: ICD-10-CM

## 2022-08-08 DIAGNOSIS — N18.4 ANEMIA DUE TO STAGE 4 CHRONIC KIDNEY DISEASE (HCC): ICD-10-CM

## 2022-08-08 NOTE — PATIENT INSTRUCTIONS
1.  Patient is to continue his current diet, medication and activity. 2.  I have encouraged the patient to get his second COVID booster vaccine. 3.  I will place orders in system for the patient to have blood test performed in the near future as ordered. 4.  I will plan to see the patient back in 3 months for his annual physical examination. 5.  Patient requests a Cologuard exam rather than a colonoscopy.

## 2022-08-08 NOTE — PROGRESS NOTES
Cologuard and insurance information faxed to [employer] @ [phone], conformation received, original sent to scan.

## 2022-08-11 ENCOUNTER — TELEPHONE (OUTPATIENT)
Dept: INTERNAL MEDICINE CLINIC | Facility: CLINIC | Age: 62
End: 2022-08-11

## 2022-08-11 NOTE — TELEPHONE ENCOUNTER
Refer to 8/8 OV, Cologaurd Form was faxed to [employer]. No copy in weekly hold bin. Not yet viewable in Media. Nursing to follow-up.

## 2022-08-12 ENCOUNTER — TELEPHONE (OUTPATIENT)
Dept: INTERNAL MEDICINE CLINIC | Facility: CLINIC | Age: 62
End: 2022-08-12

## 2022-08-12 ENCOUNTER — LAB ENCOUNTER (OUTPATIENT)
Dept: LAB | Facility: HOSPITAL | Age: 62
End: 2022-08-12
Attending: INTERNAL MEDICINE
Payer: MEDICAID

## 2022-08-12 DIAGNOSIS — Z12.5 PROSTATE CANCER SCREENING: ICD-10-CM

## 2022-08-12 DIAGNOSIS — Z00.00 ANNUAL PHYSICAL EXAM: ICD-10-CM

## 2022-08-12 DIAGNOSIS — D64.9 ANEMIA, UNSPECIFIED TYPE: Primary | ICD-10-CM

## 2022-08-12 LAB
ALBUMIN SERPL-MCNC: 3.4 G/DL (ref 3.4–5)
ALBUMIN/GLOB SERPL: 0.8 {RATIO} (ref 1–2)
ALP LIVER SERPL-CCNC: 80 U/L
ALT SERPL-CCNC: 23 U/L
ANION GAP SERPL CALC-SCNC: 9 MMOL/L (ref 0–18)
AST SERPL-CCNC: 21 U/L (ref 15–37)
BASOPHILS # BLD AUTO: 0.02 X10(3) UL (ref 0–0.2)
BASOPHILS NFR BLD AUTO: 0.3 %
BILIRUB SERPL-MCNC: 0.3 MG/DL (ref 0.1–2)
BILIRUB UR QL: NEGATIVE
BUN BLD-MCNC: 75 MG/DL (ref 7–18)
BUN/CREAT SERPL: 22.5 (ref 10–20)
CALCIUM BLD-MCNC: 9.3 MG/DL (ref 8.5–10.1)
CHLORIDE SERPL-SCNC: 110 MMOL/L (ref 98–112)
CHOLEST SERPL-MCNC: 208 MG/DL (ref ?–200)
CLARITY UR: CLEAR
CO2 SERPL-SCNC: 25 MMOL/L (ref 21–32)
COLOR UR: YELLOW
COMPLEXED PSA SERPL-MCNC: 2.77 NG/ML (ref ?–4)
CREAT BLD-MCNC: 3.33 MG/DL
DEPRECATED RDW RBC AUTO: 58.1 FL (ref 35.1–46.3)
EOSINOPHIL # BLD AUTO: 0.12 X10(3) UL (ref 0–0.7)
EOSINOPHIL NFR BLD AUTO: 1.9 %
ERYTHROCYTE [DISTWIDTH] IN BLOOD BY AUTOMATED COUNT: 13.5 % (ref 11–15)
EST. AVERAGE GLUCOSE BLD GHB EST-MCNC: 111 MG/DL (ref 68–126)
FASTING PATIENT LIPID ANSWER: YES
FASTING STATUS PATIENT QL REPORTED: YES
GFR SERPLBLD BASED ON 1.73 SQ M-ARVRAT: 20 ML/MIN/1.73M2 (ref 60–?)
GLOBULIN PLAS-MCNC: 4.3 G/DL (ref 2.8–4.4)
GLUCOSE BLD-MCNC: 89 MG/DL (ref 70–99)
GLUCOSE UR-MCNC: NEGATIVE MG/DL
HBA1C MFR BLD: 5.5 % (ref ?–5.7)
HCT VFR BLD AUTO: 32.7 %
HDLC SERPL-MCNC: 52 MG/DL (ref 40–59)
HGB BLD-MCNC: 10.6 G/DL
IMM GRANULOCYTES # BLD AUTO: 0.02 X10(3) UL (ref 0–1)
IMM GRANULOCYTES NFR BLD: 0.3 %
KETONES UR-MCNC: NEGATIVE MG/DL
LDLC SERPL CALC-MCNC: 122 MG/DL (ref ?–100)
LEUKOCYTE ESTERASE UR QL STRIP.AUTO: NEGATIVE
LYMPHOCYTES # BLD AUTO: 1.43 X10(3) UL (ref 1–4)
LYMPHOCYTES NFR BLD AUTO: 22.7 %
MCH RBC QN AUTO: 37.7 PG (ref 26–34)
MCHC RBC AUTO-ENTMCNC: 32.4 G/DL (ref 31–37)
MCV RBC AUTO: 116.4 FL
MONOCYTES # BLD AUTO: 0.76 X10(3) UL (ref 0.1–1)
MONOCYTES NFR BLD AUTO: 12.1 %
NEUTROPHILS # BLD AUTO: 3.95 X10 (3) UL (ref 1.5–7.7)
NEUTROPHILS # BLD AUTO: 3.95 X10(3) UL (ref 1.5–7.7)
NEUTROPHILS NFR BLD AUTO: 62.7 %
NITRITE UR QL STRIP.AUTO: NEGATIVE
NONHDLC SERPL-MCNC: 156 MG/DL (ref ?–130)
OSMOLALITY SERPL CALC.SUM OF ELEC: 320 MOSM/KG (ref 275–295)
PH UR: 5.5 [PH] (ref 5–8)
PLATELET # BLD AUTO: 203 10(3)UL (ref 150–450)
POTASSIUM SERPL-SCNC: 4.3 MMOL/L (ref 3.5–5.1)
PROT SERPL-MCNC: 7.7 G/DL (ref 6.4–8.2)
RBC # BLD AUTO: 2.81 X10(6)UL
SODIUM SERPL-SCNC: 144 MMOL/L (ref 136–145)
SP GR UR STRIP: 1.02 (ref 1–1.03)
TRIGL SERPL-MCNC: 193 MG/DL (ref 30–149)
TSI SER-ACNC: 1.17 MIU/ML (ref 0.36–3.74)
UROBILINOGEN UR STRIP-ACNC: 0.2
VLDLC SERPL CALC-MCNC: 34 MG/DL (ref 0–30)
WBC # BLD AUTO: 6.3 X10(3) UL (ref 4–11)

## 2022-08-12 PROCEDURE — 80061 LIPID PANEL: CPT | Performed by: INTERNAL MEDICINE

## 2022-08-12 PROCEDURE — 85025 COMPLETE CBC W/AUTO DIFF WBC: CPT | Performed by: INTERNAL MEDICINE

## 2022-08-12 PROCEDURE — 85060 BLOOD SMEAR INTERPRETATION: CPT | Performed by: INTERNAL MEDICINE

## 2022-08-12 PROCEDURE — 36415 COLL VENOUS BLD VENIPUNCTURE: CPT | Performed by: INTERNAL MEDICINE

## 2022-08-12 PROCEDURE — 80053 COMPREHEN METABOLIC PANEL: CPT | Performed by: INTERNAL MEDICINE

## 2022-08-12 PROCEDURE — 81001 URINALYSIS AUTO W/SCOPE: CPT | Performed by: INTERNAL MEDICINE

## 2022-08-12 PROCEDURE — 83036 HEMOGLOBIN GLYCOSYLATED A1C: CPT | Performed by: INTERNAL MEDICINE

## 2022-08-12 PROCEDURE — 84443 ASSAY THYROID STIM HORMONE: CPT | Performed by: INTERNAL MEDICINE

## 2022-08-12 PROCEDURE — 81015 MICROSCOPIC EXAM OF URINE: CPT | Performed by: INTERNAL MEDICINE

## 2022-08-12 NOTE — TELEPHONE ENCOUNTER
Call to patient and message left. I reviewed patient's recent lab test with him patient CBC shows that the patient's hemoglobin is better than it was. His hemoglobin is up to 10.6. His MCV is 116. I did review the remainder of his lab test.  Patient's GFR was 20. Patient's lipid panel appeared good. Patient is going to see me in about 3 months for his annual physical examination those lab tests have been placed in the system. I will place an order in the system for the patient to have additional blood test which will include an iron and iron binding capacity, ferritin level and I will also add a vitamin C13 level and folic acid level.

## 2022-08-23 NOTE — TELEPHONE ENCOUNTER
Middle Ear Infection (Adult)  You have an infection of the middle ear, the space behind the eardrum. This is also called acute otitis media (AOM). Sometimes it is caused by the common cold. This is because congestion can block the internal passage (eustachian tube) that drains fluid from the middle ear. When the middle ear fills with fluid, bacteria can grow there and cause an infection. Oral antibiotics are used to treat this illness, not ear drops. Symptoms usually start to improve within 1 to 2 days of treatment.    Home care  The following are general care guidelines:    Finish all of the antibiotic medicine given, even though you may feel better after the first few days.    You may use over-the-counter medicine, such as acetaminophen or ibuprofen, to control pain and fever, unless something else was prescribed. If you have chronic liver or kidney disease or have ever had a stomach ulcer or gastrointestinal bleeding, talk with your healthcare provider before using these medicines. Do not give aspirin to anyone under 18 years of age who has a fever. It may cause severe illness or death.  Follow-up care  Follow up with your healthcare provider, or as advised, in 2 weeks if all symptoms have not gotten better, or if hearing doesn't go back to normal within 1 month.  When to seek medical advice  Call your healthcare provider right away if any of these occur:    Ear pain gets worse or does not improve after 3 days of treatment    Unusual drowsiness or confusion    Neck pain, stiff neck, or headache    Fluid or blood draining from the ear canal    Fever of 100.4 F (38 C) or as advised     Seizure  Date Last Reviewed: 6/1/2016 2000-2017 [employer]. [address] [place] [address], [place] [address]. All rights reserved. This information is not intended as a substitute for professional medical care. Always follow your healthcare professional's instructions.         Re-faxed Cologaurd Order; Received fax confirmation.

## 2022-09-15 RX ORDER — FOLIC ACID 1 MG/1
TABLET ORAL
Qty: 90 TABLET | Refills: 3 | Status: SHIPPED | OUTPATIENT
Start: 2022-09-15

## 2022-09-19 ENCOUNTER — TELEPHONE (OUTPATIENT)
Dept: INTERNAL MEDICINE CLINIC | Facility: CLINIC | Age: 62
End: 2022-09-19

## 2022-09-19 DIAGNOSIS — R19.5 POSITIVE COLORECTAL CANCER SCREENING USING COLOGUARD TEST: Primary | ICD-10-CM

## 2022-09-19 NOTE — TELEPHONE ENCOUNTER
Received positive Cologuard test 9/7/2022    Recommended the patient undergo gastroenterology evaluation and colonoscopy. Patient was already notified of the positive Cologuard test.  Was referred to Dr. Migdalia Grande 9/9/2022.   We will make an appointment today

## 2022-09-28 ENCOUNTER — TELEPHONE (OUTPATIENT)
Dept: INTERNAL MEDICINE CLINIC | Facility: CLINIC | Age: 62
End: 2022-09-28

## 2022-09-28 NOTE — TELEPHONE ENCOUNTER
The following results received from [facility]: CBC/Diff, CMP, UA w/reflex, phosphorous level, protein total random, HIV 1 and 2 antibody. Collected 9/23/22. Labs ordered by Dr. Crys Dang. Results were faxed to Dr. Crys Dang and faxed to Dr. Javan Ruiz as a copy. Patient should RTC in November for 3-month checkup per August OV. Results stable/baseline. Result sent to [facility].     [facility] Dr. Javan Ruiz

## 2022-10-05 ENCOUNTER — TELEPHONE (OUTPATIENT)
Dept: INTERNAL MEDICINE CLINIC | Facility: CLINIC | Age: 62
End: 2022-10-05

## 2022-10-05 RX ORDER — TEMAZEPAM 30 MG/1
CAPSULE ORAL
Qty: 30 CAPSULE | Refills: 0 | Status: SHIPPED | OUTPATIENT
Start: 2022-10-05

## 2022-10-05 NOTE — TELEPHONE ENCOUNTER
To Dr. Evette Colin---    The above refill request is for a controlled substance. Please review pended medication order.      lov 8/8  Prescribed #30 with 5 4/13/22   #30 9/6

## 2022-10-12 ENCOUNTER — TELEPHONE (OUTPATIENT)
Dept: INTERNAL MEDICINE CLINIC | Facility: CLINIC | Age: 62
End: 2022-10-12

## 2022-10-12 DIAGNOSIS — R19.5 POSITIVE COLORECTAL CANCER SCREENING USING COLOGUARD TEST: Primary | ICD-10-CM

## 2022-10-12 NOTE — TELEPHONE ENCOUNTER
Referral placed per protocol. Listed as 'authorized.' Referral faxed to [phone]. Fax confirmation received.

## 2022-10-12 NOTE — TELEPHONE ENCOUNTER
Patient is calling today to request a referral for:    SANDY Díaz, FNP-BC  [address],   [place], [address] [place] [address]  Ph # [phone]  Fax # [phone]    Patient has an appointment on Monday, 10/17/2022 at 0900. Patient also informed  that he does not have an HMO insurance.    Patient has [facility], [facility] # [id number]

## 2022-11-01 ENCOUNTER — TELEPHONE (OUTPATIENT)
Dept: INTERNAL MEDICINE CLINIC | Facility: CLINIC | Age: 62
End: 2022-11-01

## 2022-11-02 NOTE — TELEPHONE ENCOUNTER
To MD:  The above refill request is for a controlled substance. Please review pended medication order. Print and sign for staff to fax to pharmacy or prescribe electronically. Last office visit:  Last time refill sent and quantity/refills: 10/5/22 #30 with 0   Per [facility]  last dispensed 10/5/22    TO Dr. Vanesa Rojas to please advise in Dr. Khalil Payment absence.  Pending with earliest dispense date 11/3/22

## 2022-11-03 RX ORDER — TEMAZEPAM 30 MG/1
CAPSULE ORAL
Qty: 30 CAPSULE | Refills: 0 | Status: SHIPPED | OUTPATIENT
Start: 2022-11-03

## 2022-11-17 ENCOUNTER — TELEPHONE (OUTPATIENT)
Dept: INTERNAL MEDICINE CLINIC | Facility: CLINIC | Age: 62
End: 2022-11-17

## 2022-11-17 NOTE — TELEPHONE ENCOUNTER
To Dr. Stacy Penn:  Please review outside lab results in absence of Dr. Tani Horton (placed in inbox). Hgb 10.7    Pt has upcoming GI consult 12/20. Does not have any upcoming appts scheduled at [facility]; last seen in August by Dr. Tani Horton who wanted him to f/up in 3 month for his annual physical.     Please advise.

## 2022-11-20 NOTE — TELEPHONE ENCOUNTER
I reviewed the lab tests from 11/15 per Dr. Papito Olsen. Hb is stable 10.7 (10.6 8/12/2022)  Kidney function stable - maintain good hydration    Please have patient see me in the next 1-3 weeks for physical examination in place of Dr. Tori John. We can go over the labs together in greater detail.

## 2022-11-21 NOTE — TELEPHONE ENCOUNTER
Called patient and relayed DR. MAGAÑA message - verbalized understanding.  He wants to wait until after the holidays for physical

## 2022-11-28 RX ORDER — TRAZODONE HYDROCHLORIDE 50 MG/1
TABLET ORAL
Qty: 60 TABLET | Refills: 11 | Status: SHIPPED | OUTPATIENT
Start: 2022-11-28

## 2022-12-05 ENCOUNTER — TELEPHONE (OUTPATIENT)
Dept: INTERNAL MEDICINE CLINIC | Facility: CLINIC | Age: 62
End: 2022-12-05

## 2022-12-06 RX ORDER — TEMAZEPAM 30 MG/1
CAPSULE ORAL
Qty: 30 CAPSULE | Refills: 0 | Status: SHIPPED | OUTPATIENT
Start: 2022-12-06

## 2022-12-06 NOTE — TELEPHONE ENCOUNTER
To MD:  The above refill request is for a controlled substance. Please review pended medication order. Print and sign for staff to fax to pharmacy or prescribe electronically. Last office visit: 8/8/22  Last time refill sent and quantity/refills: 11/3/22 #30 with 0   Per [facility]  last dispensed 11/3/22    TO Dr. Sonam Lantigua to please advise on refill in Dr. Josh Hatchet absence. Thanks!

## 2022-12-31 ENCOUNTER — TELEPHONE (OUTPATIENT)
Dept: INTERNAL MEDICINE CLINIC | Facility: CLINIC | Age: 62
End: 2022-12-31

## 2023-01-03 RX ORDER — TEMAZEPAM 30 MG/1
30 CAPSULE ORAL DAILY
Qty: 30 CAPSULE | Refills: 0 | Status: SHIPPED | OUTPATIENT
Start: 2023-01-04

## 2023-01-03 NOTE — TELEPHONE ENCOUNTER
To MD:  The above refill request is for a controlled substance. Please review pended medication order. Print and sign for staff to fax to pharmacy or prescribe electronically. Last office visit: 8/8/22   Last time refill sent and quantity/refills: 12/6/22 #30 with 0   Per [facility]  last dispensed 12/6/22    TO Dr. Yrn Kurtz to please advise on refill in Dr. Zakiya Wagner absence. Pending with earliest dispense date of 1/4/23.

## 2023-01-05 NOTE — ED PROVIDER NOTES
Patient Seen in: [facility] Emergency Department      History   Patient presents with:  Covid    Stated Complaint: Covid    Subjective:   HPI    18-year-old male with history of CKD, COPD, hypertension, hyperlipidemia, HIV, last viral load undetec Eyes: Negative for visual disturbance. Respiratory: Positive for shortness of breath. Cardiovascular: Negative for chest pain. Gastrointestinal: Negative for abdominal pain. Genitourinary: Negative for dysuria.    Musculoskeletal: Negative for ba hour(s)). Imaging Results Available and Reviewed by me while in ED:  No results found. EMERGENCY DEPARTMENT COURSE AND TREATMENT:  Patient's condition was stable during Emergency Department evaluation.      57yoM with COVID  - I personally reviewed informed of alternatives to receiving authorized Regen-COV, and informed that casirivimab and imdevimab are unapproved drugs that are authorized for use under this Emergency Use Authorization.  The patient has agreed and will receive the infusion and be mon Justice Luz)  [facility] [place] [address]  Phone: [phone]  Fax: [phone]  Established Patient  Follow Up Time: 7-10 Days

## 2023-01-09 ENCOUNTER — TELEPHONE (OUTPATIENT)
Dept: INTERNAL MEDICINE CLINIC | Facility: CLINIC | Age: 63
End: 2023-01-09

## 2023-01-11 RX ORDER — ATORVASTATIN CALCIUM 10 MG/1
TABLET, FILM COATED ORAL
Qty: 30 TABLET | Refills: 11 | Status: SHIPPED | OUTPATIENT
Start: 2023-01-11

## 2023-02-02 ENCOUNTER — OFFICE VISIT (OUTPATIENT)
Dept: INTERNAL MEDICINE CLINIC | Facility: CLINIC | Age: 63
End: 2023-02-02

## 2023-02-02 VITALS
HEART RATE: 84 BPM | BODY MASS INDEX: 22.73 KG/M2 | WEIGHT: 150 LBS | OXYGEN SATURATION: 97 % | HEIGHT: 68 IN | SYSTOLIC BLOOD PRESSURE: 100 MMHG | DIASTOLIC BLOOD PRESSURE: 60 MMHG | TEMPERATURE: 98 F

## 2023-02-02 DIAGNOSIS — D63.1 ANEMIA DUE TO STAGE 4 CHRONIC KIDNEY DISEASE (HCC): ICD-10-CM

## 2023-02-02 DIAGNOSIS — R05.1 ACUTE COUGH: ICD-10-CM

## 2023-02-02 DIAGNOSIS — Z96.643 HISTORY OF BILATERAL HIP REPLACEMENTS: ICD-10-CM

## 2023-02-02 DIAGNOSIS — I10 ESSENTIAL HYPERTENSION: ICD-10-CM

## 2023-02-02 DIAGNOSIS — R73.01 ABNORMAL FASTING GLUCOSE: ICD-10-CM

## 2023-02-02 DIAGNOSIS — I35.1 AORTIC VALVE INSUFFICIENCY, ETIOLOGY OF CARDIAC VALVE DISEASE UNSPECIFIED: ICD-10-CM

## 2023-02-02 DIAGNOSIS — R19.5 POSITIVE COLORECTAL CANCER SCREENING USING COLOGUARD TEST: ICD-10-CM

## 2023-02-02 DIAGNOSIS — E78.1 HYPERTRIGLYCERIDEMIA: ICD-10-CM

## 2023-02-02 DIAGNOSIS — J43.9 PULMONARY EMPHYSEMA, UNSPECIFIED EMPHYSEMA TYPE (HCC): ICD-10-CM

## 2023-02-02 DIAGNOSIS — N18.4 STAGE 4 CHRONIC KIDNEY DISEASE (HCC): ICD-10-CM

## 2023-02-02 DIAGNOSIS — M15.9 PRIMARY OSTEOARTHRITIS INVOLVING MULTIPLE JOINTS: ICD-10-CM

## 2023-02-02 DIAGNOSIS — D64.9 ANEMIA, UNSPECIFIED TYPE: ICD-10-CM

## 2023-02-02 DIAGNOSIS — N18.4 ANEMIA DUE TO STAGE 4 CHRONIC KIDNEY DISEASE (HCC): ICD-10-CM

## 2023-02-02 DIAGNOSIS — E78.00 HYPERCHOLESTEROLEMIA: ICD-10-CM

## 2023-02-02 DIAGNOSIS — R53.83 FATIGUE, UNSPECIFIED TYPE: ICD-10-CM

## 2023-02-02 DIAGNOSIS — Z00.00 ANNUAL PHYSICAL EXAM: Primary | ICD-10-CM

## 2023-02-02 DIAGNOSIS — B20 HUMAN IMMUNODEFICIENCY VIRUS (HIV) DISEASE (HCC): ICD-10-CM

## 2023-02-02 PROCEDURE — 3074F SYST BP LT 130 MM HG: CPT | Performed by: INTERNAL MEDICINE

## 2023-02-02 PROCEDURE — 3078F DIAST BP <80 MM HG: CPT | Performed by: INTERNAL MEDICINE

## 2023-02-02 PROCEDURE — 99396 PREV VISIT EST AGE 40-64: CPT | Performed by: INTERNAL MEDICINE

## 2023-02-02 PROCEDURE — 3008F BODY MASS INDEX DOCD: CPT | Performed by: INTERNAL MEDICINE

## 2023-02-02 RX ORDER — DAPAGLIFLOZIN 10 MG/1
1 TABLET, FILM COATED ORAL DAILY
COMMUNITY
Start: 2023-01-30

## 2023-02-02 RX ORDER — SILDENAFIL 100 MG/1
100 TABLET, FILM COATED ORAL AS NEEDED
Qty: 6 TABLET | Refills: 11 | Status: SHIPPED | OUTPATIENT
Start: 2023-02-02 | End: 2024-02-02

## 2023-02-02 RX ORDER — AZITHROMYCIN 250 MG/1
TABLET, FILM COATED ORAL
Qty: 6 TABLET | Refills: 1 | Status: SHIPPED | OUTPATIENT
Start: 2023-02-02 | End: 2023-02-07

## 2023-02-02 NOTE — PATIENT INSTRUCTIONS
Patient is to continue his current diet, medication and activity. Patient to follow-up with Dr. Desmond Dong or one of his associates for a screening colonoscopy due to his positive Cologuard test.  I will give the patient an order to obtain a chest x-ray PA and lateral due to his recent and persistent cough rule out pneumonia. I will send in a prescription for a Z-Chet to the patient's pharmacy with 1 refill to use as necessary. I will place orders in the system for the patient to have blood tests which will include a CBC, CMP, lipid panel, and urinalysis. I will plan to see the patient back in about 6 months.

## 2023-02-06 NOTE — TELEPHONE ENCOUNTER
To MD:  The above refill request is for a controlled substance. Please review pended medication order. Print and sign for staff to fax to pharmacy or prescribe electronically.     Last office visit: 2/2/23  Last time refill sent and quantity/refills: 1/4/23 #30 with 0   Per [facility]  last dispensed 1/5/23    Additional refills pended as patient has seen PCP for physical

## 2023-02-07 NOTE — TELEPHONE ENCOUNTER
Pt called   He saw Dr Jackson Carpio on 2/2 and did not receive his refill for Temazepam   Patient is out of medication   Requests refill is sent to [facility] today

## 2023-02-08 RX ORDER — TEMAZEPAM 30 MG/1
CAPSULE ORAL
Qty: 30 CAPSULE | Refills: 5 | Status: SHIPPED | OUTPATIENT
Start: 2023-02-08

## 2023-02-08 NOTE — TELEPHONE ENCOUNTER
Noted.  I refilled patient's medication as requested with 5 additional refills. I have forwarded this to the patient's pharmacy. Please notify patient that this has been done. I will forward this message to nursing.   Thank you!!

## 2023-02-28 ENCOUNTER — LAB ENCOUNTER (OUTPATIENT)
Dept: LAB | Facility: HOSPITAL | Age: 63
End: 2023-02-28
Attending: INTERNAL MEDICINE
Payer: MEDICAID

## 2023-02-28 ENCOUNTER — TELEPHONE (OUTPATIENT)
Dept: INTERNAL MEDICINE CLINIC | Facility: CLINIC | Age: 63
End: 2023-02-28

## 2023-02-28 LAB
ALBUMIN SERPL-MCNC: 3.6 G/DL (ref 3.4–5)
ALBUMIN/GLOB SERPL: 0.8 {RATIO} (ref 1–2)
ALP LIVER SERPL-CCNC: 85 U/L
ALT SERPL-CCNC: 16 U/L
ANION GAP SERPL CALC-SCNC: 4 MMOL/L (ref 0–18)
AST SERPL-CCNC: 16 U/L (ref 15–37)
BASOPHILS # BLD AUTO: 0.01 X10(3) UL (ref 0–0.2)
BASOPHILS NFR BLD AUTO: 0.2 %
BILIRUB SERPL-MCNC: 0.2 MG/DL (ref 0.1–2)
BILIRUB UR QL: NEGATIVE
BUN BLD-MCNC: 66 MG/DL (ref 7–18)
BUN/CREAT SERPL: 16.4 (ref 10–20)
CALCIUM BLD-MCNC: 9.2 MG/DL (ref 8.5–10.1)
CHLORIDE SERPL-SCNC: 119 MMOL/L (ref 98–112)
CHOLEST SERPL-MCNC: 191 MG/DL (ref ?–200)
CLARITY UR: CLEAR
CO2 SERPL-SCNC: 19 MMOL/L (ref 21–32)
COLOR UR: COLORLESS
CREAT BLD-MCNC: 4.03 MG/DL
DEPRECATED HBV CORE AB SER IA-ACNC: 96.2 NG/ML
DEPRECATED RDW RBC AUTO: 69.5 FL (ref 35.1–46.3)
EOSINOPHIL # BLD AUTO: 0.18 X10(3) UL (ref 0–0.7)
EOSINOPHIL NFR BLD AUTO: 3 %
ERYTHROCYTE [DISTWIDTH] IN BLOOD BY AUTOMATED COUNT: 15.4 % (ref 11–15)
FASTING PATIENT LIPID ANSWER: YES
FASTING STATUS PATIENT QL REPORTED: YES
FOLATE SERPL-MCNC: >20 NG/ML (ref 8.7–?)
GFR SERPLBLD BASED ON 1.73 SQ M-ARVRAT: 16 ML/MIN/1.73M2 (ref 60–?)
GLOBULIN PLAS-MCNC: 4.3 G/DL (ref 2.8–4.4)
GLUCOSE BLD-MCNC: 107 MG/DL (ref 70–99)
GLUCOSE UR-MCNC: 500 MG/DL
HCT VFR BLD AUTO: 31 %
HDLC SERPL-MCNC: 57 MG/DL (ref 40–59)
HGB BLD-MCNC: 9.8 G/DL
HGB UR QL STRIP.AUTO: NEGATIVE
IMM GRANULOCYTES # BLD AUTO: 0.02 X10(3) UL (ref 0–1)
IMM GRANULOCYTES NFR BLD: 0.3 %
IRON SATN MFR SERPL: 14 %
IRON SERPL-MCNC: 55 UG/DL
KETONES UR-MCNC: NEGATIVE MG/DL
LDLC SERPL CALC-MCNC: 98 MG/DL (ref ?–100)
LEUKOCYTE ESTERASE UR QL STRIP.AUTO: NEGATIVE
LYMPHOCYTES # BLD AUTO: 1.69 X10(3) UL (ref 1–4)
LYMPHOCYTES NFR BLD AUTO: 27.9 %
MCH RBC QN AUTO: 38.7 PG (ref 26–34)
MCHC RBC AUTO-ENTMCNC: 31.6 G/DL (ref 31–37)
MCV RBC AUTO: 122.5 FL
MONOCYTES # BLD AUTO: 0.49 X10(3) UL (ref 0.1–1)
MONOCYTES NFR BLD AUTO: 8.1 %
NEUTROPHILS # BLD AUTO: 3.67 X10 (3) UL (ref 1.5–7.7)
NEUTROPHILS # BLD AUTO: 3.67 X10(3) UL (ref 1.5–7.7)
NEUTROPHILS NFR BLD AUTO: 60.5 %
NITRITE UR QL STRIP.AUTO: NEGATIVE
NONHDLC SERPL-MCNC: 134 MG/DL (ref ?–130)
OSMOLALITY SERPL CALC.SUM OF ELEC: 314 MOSM/KG (ref 275–295)
PH UR: 5 [PH] (ref 5–8)
PLATELET # BLD AUTO: 201 10(3)UL (ref 150–450)
PLATELET MORPHOLOGY: NORMAL
POTASSIUM SERPL-SCNC: 4.5 MMOL/L (ref 3.5–5.1)
PROT SERPL-MCNC: 7.9 G/DL (ref 6.4–8.2)
PROT UR-MCNC: 70 MG/DL
RBC # BLD AUTO: 2.53 X10(6)UL
SODIUM SERPL-SCNC: 142 MMOL/L (ref 136–145)
SP GR UR STRIP: 1.01 (ref 1–1.03)
TIBC SERPL-MCNC: 387 UG/DL (ref 240–450)
TRANSFERRIN SERPL-MCNC: 260 MG/DL (ref 200–360)
TRIGL SERPL-MCNC: 213 MG/DL (ref 30–149)
UROBILINOGEN UR STRIP-ACNC: NORMAL
VIT B12 SERPL-MCNC: 483 PG/ML (ref 193–986)
VLDLC SERPL CALC-MCNC: 35 MG/DL (ref 0–30)
WBC # BLD AUTO: 6.1 X10(3) UL (ref 4–11)

## 2023-02-28 PROCEDURE — 80053 COMPREHEN METABOLIC PANEL: CPT | Performed by: INTERNAL MEDICINE

## 2023-02-28 PROCEDURE — 84466 ASSAY OF TRANSFERRIN: CPT | Performed by: INTERNAL MEDICINE

## 2023-02-28 PROCEDURE — 82746 ASSAY OF FOLIC ACID SERUM: CPT | Performed by: INTERNAL MEDICINE

## 2023-02-28 PROCEDURE — 36415 COLL VENOUS BLD VENIPUNCTURE: CPT | Performed by: INTERNAL MEDICINE

## 2023-02-28 PROCEDURE — 85060 BLOOD SMEAR INTERPRETATION: CPT | Performed by: INTERNAL MEDICINE

## 2023-02-28 PROCEDURE — 83540 ASSAY OF IRON: CPT | Performed by: INTERNAL MEDICINE

## 2023-02-28 PROCEDURE — 81001 URINALYSIS AUTO W/SCOPE: CPT | Performed by: INTERNAL MEDICINE

## 2023-02-28 PROCEDURE — 82607 VITAMIN B-12: CPT | Performed by: INTERNAL MEDICINE

## 2023-02-28 PROCEDURE — 80061 LIPID PANEL: CPT | Performed by: INTERNAL MEDICINE

## 2023-02-28 PROCEDURE — 82728 ASSAY OF FERRITIN: CPT | Performed by: INTERNAL MEDICINE

## 2023-02-28 PROCEDURE — 85025 COMPLETE CBC W/AUTO DIFF WBC: CPT | Performed by: INTERNAL MEDICINE

## 2023-02-28 NOTE — TELEPHONE ENCOUNTER
To Dr Martin Heart on call -- Please review pts labs. Ok to wait for Dr ALFARO ?         Component      Latest Ref Rng & Units 2/28/2023 8/12/2022   WBC      4.0 - 11.0 x10(3) uL 6.1 6.3   RBC      4.30 - 5.70 x10(6)uL 2.53 (L) 2.81 (L)   Hemoglobin      13.0 - 17.5 g/dL 9.8 (L) 10.6 (L)   Hematocrit      39.0 - 53.0 % 31.0 (L) 32.7 (L)   MCV      80.0 - 100.0 fL 122.5 (H) 116.4 (H)   MCH      26.0 - 34.0 pg 38.7 (H) 37.7 (H)   MCHC      31.0 - 37.0 g/dL 31.6 32.4   RDW-SD      35.1 - 46.3 fL 69.5 (H) 58.1 (H)   RDW      11.0 - 15.0 % 15.4 (H) 13.5   Platelet Count      618.9 - 450.0 10(3)uL 201.0 203.0   Prelim Neutrophil Abs      1.50 - 7.70 x10 (3) uL 3.67 3.95   Neutrophils Absolute      1.50 - 7.70 x10(3) uL 3.67 3.95   Lymphocytes Absolute      1.00 - 4.00 x10(3) uL 1.69 1.43   Monocytes Absolute      0.10 - 1.00 x10(3) uL 0.49 0.76   Eosinophils Absolute      0.00 - 0.70 x10(3) uL 0.18 0.12   Basophils Absolute      0.00 - 0.20 x10(3) uL 0.01 0.02   Immature Granulocyte Absolute      0.00 - 1.00 x10(3) uL 0.02 0.02   Neutrophils %      % 60.5 62.7   Lymphocytes %      % 27.9 22.7   Monocytes %      % 8.1 12.1   Eosinophils %      % 3.0 1.9   Basophils %      % 0.2 0.3   Immature Granulocyte %      % 0.3 0.3   Glucose      70 - 99 mg/dL 107 (H) 89   Sodium      136 - 145 mmol/L 142 144   Potassium      3.5 - 5.1 mmol/L 4.5 4.3   Chloride      98 - 112 mmol/L 119 (H) 110   Carbon Dioxide, Total      21.0 - 32.0 mmol/L 19.0 (L) 25.0   ANION GAP      0 - 18 mmol/L 4 9   BUN      7 - 18 mg/dL 66 (H) 75 (H)   CREATININE      0.70 - 1.30 mg/dL 4.03 (H) 3.33 (H)   BUN/CREATININE RATIO      10.0 - 20.0 16.4 22.5 (H)   CALCIUM      8.5 - 10.1 mg/dL 9.2 9.3   CALCULATED OSMOLALITY      275 - 295 mOsm/kg 314 (H) 320 (H)   eGFR-Cr      >=60 mL/min/1.73m2 16 (L) 20 (L)   ALT (SGPT)      16 - 61 U/L 16 23   AST (SGOT)      15 - 37 U/L 16 21   ALKALINE PHOSPHATASE      45 - 117 U/L 85 80   Total Bilirubin      0.1 - 2.0 mg/dL 0.2 0.3 PROTEIN, TOTAL      6.4 - 8.2 g/dL 7.9 7.7   Albumin      3.4 - 5.0 g/dL 3.6 3.4   Globulin      2.8 - 4.4 g/dL 4.3 4.3   A/G Ratio      1.0 - 2.0 0.8 (L) 0.8 (L)   Patient Fasting for CMP?        Yes Yes   Color Urine      Yellow Colorless (A)    Clarity Urine      Clear Clear    Spec Gravity      1.005 - 1.030 1.011    Glucose Urine      Normal mg/dL 500 (A)    Bilirubin Urine      Negative Negative    Ketones, UA      Negative mg/dL Negative    Blood Urine      Negative Negative    PH Urine      5.0 - 8.0 5.0    Protein Urine      Negative, Trace mg/dL 70 (A)    Urobilinogen Urine      Normal Normal    Nitrite Urine      Negative Negative    WBC      Negative Negative    WBC Urine      0 - 5 /HPF 1-5    RBC Urine      0 - 2 /HPF 0-2    Bacteria Urine      None Seen /HPF None Seen    SQUAM EPI CELLS UR      None Seen /HPF Few (A)

## 2023-03-02 ENCOUNTER — TELEPHONE (OUTPATIENT)
Dept: INTERNAL MEDICINE CLINIC | Facility: CLINIC | Age: 63
End: 2023-03-02

## 2023-03-02 DIAGNOSIS — E78.9 BORDERLINE HIGH CHOLESTEROL: ICD-10-CM

## 2023-03-02 DIAGNOSIS — D64.9 ANEMIA, UNSPECIFIED TYPE: Primary | ICD-10-CM

## 2023-03-02 DIAGNOSIS — N18.4 STAGE 4 CHRONIC KIDNEY DISEASE (HCC): ICD-10-CM

## 2023-03-02 DIAGNOSIS — R53.83 FATIGUE, UNSPECIFIED TYPE: ICD-10-CM

## 2023-03-03 NOTE — TELEPHONE ENCOUNTER
Telephone call to patient to discuss recent blood test.  Patient's recent blood test show he has an anemia with a hemoglobin at 9.8 which is slightly worse than before. Patient's kidney function is worse than before with a GFR of 16. I have advised the patient that he needs to follow-up with his kidney doctor at this time. I reviewed the other lab test left message for him about them also. Patient to follow-up with me in 3 to 6 months. In my last note I recommended some blood test that we will that should be done at that time I believe it included a CBC, CMP, lipid panel and a urinalysis I will put orders for this in the system to be done before he sees me in 3 to 6 months. Patient is to call me back if he has questions I will see him sooner as necessary.

## 2023-03-29 ENCOUNTER — NURSE ONLY (OUTPATIENT)
Facility: CLINIC | Age: 63
End: 2023-03-29

## 2023-03-29 NOTE — PROGRESS NOTES
Spoke with pt. Explained reasoning for OV. Patient agreeable with OV w/APRN. Date, time, provider, and location discussed over the phone.  [facility] closed

## 2023-04-10 RX ORDER — SERTRALINE HYDROCHLORIDE 100 MG/1
TABLET, FILM COATED ORAL
Qty: 180 TABLET | Refills: 3 | Status: SHIPPED | OUTPATIENT
Start: 2023-04-10

## 2023-07-07 RX ORDER — TEMAZEPAM 30 MG/1
30 CAPSULE ORAL DAILY
Qty: 30 CAPSULE | Refills: 5 | Status: CANCELLED | OUTPATIENT
Start: 2023-07-07

## 2023-07-07 NOTE — TELEPHONE ENCOUNTER
Spoke to [facility]; still has 1 refill left off temazepam rx from February which they are getting ready and will be ready later today.

## 2023-08-03 ENCOUNTER — TELEPHONE (OUTPATIENT)
Dept: INTERNAL MEDICINE CLINIC | Facility: CLINIC | Age: 63
End: 2023-08-03

## 2023-08-03 RX ORDER — TEMAZEPAM 30 MG/1
30 CAPSULE ORAL DAILY
Qty: 30 CAPSULE | Refills: 5 | Status: SHIPPED | OUTPATIENT
Start: 2023-08-03

## 2023-08-03 NOTE — TELEPHONE ENCOUNTER
Patient is calling requesting a refill for TEMAZEPAM 30MG    Patient states he only has 1 pill left    Please send to [facility] [facility]

## 2023-08-03 NOTE — TELEPHONE ENCOUNTER
To MD:  The above refill request is for a controlled substance. Please review pended medication order. Print and sign for staff to fax to pharmacy or prescribe electronically.     Last office visit: 2/2/23  Last time refill sent and quantity/refills: 2/8/23 #30 with 5   Per [place] , last dispensed 7/7/23

## 2023-08-14 ENCOUNTER — OFFICE VISIT (OUTPATIENT)
Dept: INTERNAL MEDICINE CLINIC | Facility: CLINIC | Age: 63
End: 2023-08-14

## 2023-08-14 VITALS
HEART RATE: 72 BPM | TEMPERATURE: 99 F | SYSTOLIC BLOOD PRESSURE: 144 MMHG | WEIGHT: 135.63 LBS | BODY MASS INDEX: 20.56 KG/M2 | HEIGHT: 68 IN | DIASTOLIC BLOOD PRESSURE: 56 MMHG | OXYGEN SATURATION: 98 %

## 2023-08-14 DIAGNOSIS — R05.1 ACUTE COUGH: ICD-10-CM

## 2023-08-14 DIAGNOSIS — R23.8 PAPULE: ICD-10-CM

## 2023-08-14 DIAGNOSIS — J43.9 PULMONARY EMPHYSEMA, UNSPECIFIED EMPHYSEMA TYPE (HCC): ICD-10-CM

## 2023-08-14 DIAGNOSIS — I35.1 AORTIC VALVE INSUFFICIENCY, ETIOLOGY OF CARDIAC VALVE DISEASE UNSPECIFIED: ICD-10-CM

## 2023-08-14 DIAGNOSIS — I10 ESSENTIAL HYPERTENSION: Primary | ICD-10-CM

## 2023-08-14 DIAGNOSIS — E78.1 HYPERTRIGLYCERIDEMIA: ICD-10-CM

## 2023-08-14 DIAGNOSIS — D63.1 ANEMIA DUE TO STAGE 4 CHRONIC KIDNEY DISEASE: ICD-10-CM

## 2023-08-14 DIAGNOSIS — R63.4 WEIGHT LOSS: ICD-10-CM

## 2023-08-14 DIAGNOSIS — M15.9 PRIMARY OSTEOARTHRITIS INVOLVING MULTIPLE JOINTS: ICD-10-CM

## 2023-08-14 DIAGNOSIS — R73.01 ABNORMAL FASTING GLUCOSE: ICD-10-CM
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
## 2023-08-14 DIAGNOSIS — N18.4 ANEMIA DUE TO STAGE 4 CHRONIC KIDNEY DISEASE: ICD-10-CM

## 2023-08-14 DIAGNOSIS — R53.83 FATIGUE, UNSPECIFIED TYPE: ICD-10-CM

## 2023-08-14 DIAGNOSIS — E78.00 HYPERCHOLESTEROLEMIA: ICD-10-CM

## 2023-08-14 DIAGNOSIS — R07.89 OTHER CHEST PAIN: ICD-10-CM

## 2023-08-14 DIAGNOSIS — N18.4 STAGE 4 CHRONIC KIDNEY DISEASE (HCC): ICD-10-CM

## 2023-08-14 DIAGNOSIS — B20 HUMAN IMMUNODEFICIENCY VIRUS (HIV) DISEASE (HCC): ICD-10-CM

## 2023-08-14 PROCEDURE — 3008F BODY MASS INDEX DOCD: CPT | Performed by: INTERNAL MEDICINE

## 2023-08-14 PROCEDURE — 99214 OFFICE O/P EST MOD 30 MIN: CPT | Performed by: INTERNAL MEDICINE

## 2023-08-14 PROCEDURE — 3078F DIAST BP <80 MM HG: CPT | Performed by: INTERNAL MEDICINE

## 2023-08-14 PROCEDURE — 3077F SYST BP >= 140 MM HG: CPT | Performed by: INTERNAL MEDICINE

## 2023-08-14 RX ORDER — HYDROCODONE BITARTRATE AND ACETAMINOPHEN 5; 325 MG/1; MG/1
1 TABLET ORAL EVERY 6 HOURS PRN
Qty: 20 TABLET | Refills: 0 | Status: SHIPPED | OUTPATIENT
Start: 2023-08-14

## 2023-08-14 NOTE — PATIENT INSTRUCTIONS
Patient is to continue his current diet, medication and activity. I will obtain a chest x-ray on the patient and also x-ray of his right ribs. I will give the patient hydrocodone 5/325 that he can take 1 tablet 3 times a day as necessary for pain. I will give the patient 20 tablets to use as necessary.

## 2023-08-15 ENCOUNTER — PATIENT MESSAGE (OUTPATIENT)
Dept: INTERNAL MEDICINE CLINIC | Facility: CLINIC | Age: 63
End: 2023-08-15

## 2023-08-15 NOTE — TELEPHONE ENCOUNTER
From: Carlo Thakkar  To: Chuy Churchill MD  Sent: 8/15/2023 9:46 AM CDT  Subject:     Thanks for making me feel better. I am so glad that I still have you as my primary physician. I hope you understand that I was very happy to see you. I know you have been though a personal sagar. I want you to know I see you as a person I can depend on. If I have cross the line between a professional actions I apologize. I have grown to see as a very sensitive person. Not only my physician, but a person that I have really learned to respect. I feel your lost. Please continue being my physician.

## 2023-08-16 ENCOUNTER — TELEPHONE (OUTPATIENT)
Dept: INTERNAL MEDICINE CLINIC | Facility: CLINIC | Age: 63
End: 2023-08-16

## 2023-08-16 NOTE — TELEPHONE ENCOUNTER
Patient is calling he spoke to his Ins regarding new script called into [facility]    He was told it will need a PA, patient will contact [facility] and request the info for the PA be faxed to the office.     When the office receives this patient is requesting this be completed asap

## 2023-08-18 NOTE — TELEPHONE ENCOUNTER
Pt. Called for refills on ProAir inhaler. He only has a couple of puffs left and he will be out. Please send too [facility] in [place].

## 2023-08-21 RX ORDER — ALBUTEROL SULFATE 90 UG/1
1 AEROSOL, METERED RESPIRATORY (INHALATION) EVERY 6 HOURS PRN
Qty: 8.5 G | Refills: 3 | Status: SHIPPED | OUTPATIENT
Start: 2023-08-21

## 2023-08-21 NOTE — TELEPHONE ENCOUNTER
Refill request is for a maintenance medication and has met the criteria specified in the Ambulatory Medication Refill Standing Order for eligibility, visits, laboratory, alerts and was sent to the requested pharmacy. Requested Prescriptions     Signed Prescriptions Disp Refills    albuterol (PROAIR HFA) 108 (90 Base) MCG/ACT Inhalation Aero Soln 8.5 g 3     Sig: Inhale 1 puff into the lungs every 6 (six) hours as needed for Wheezing.      Authorizing Provider: Joselin Hogue     Ordering User: Carol Bueno

## 2023-09-01 ENCOUNTER — LAB ENCOUNTER (OUTPATIENT)
Dept: LAB | Facility: HOSPITAL | Age: 63
End: 2023-09-01
Attending: INTERNAL MEDICINE
Payer: MEDICAID

## 2023-09-01 ENCOUNTER — TELEPHONE (OUTPATIENT)
Dept: INTERNAL MEDICINE CLINIC | Facility: CLINIC | Age: 63
End: 2023-09-01

## 2023-09-01 ENCOUNTER — HOSPITAL ENCOUNTER (OUTPATIENT)
Dept: GENERAL RADIOLOGY | Facility: HOSPITAL | Age: 63
Discharge: HOME | End: 2023-09-01
Attending: INTERNAL MEDICINE
Payer: MEDICAID

## 2023-09-01 DIAGNOSIS — R07.89 OTHER CHEST PAIN: ICD-10-CM
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
## 2023-09-01 DIAGNOSIS — R05.1 ACUTE COUGH: ICD-10-CM

## 2023-09-01 LAB
ALBUMIN SERPL-MCNC: 3.3 G/DL (ref 3.4–5)
ALBUMIN/GLOB SERPL: 0.9 {RATIO} (ref 1–2)
ALP LIVER SERPL-CCNC: 109 U/L
ALT SERPL-CCNC: 44 U/L
ANION GAP SERPL CALC-SCNC: 7 MMOL/L (ref 0–18)
AST SERPL-CCNC: 34 U/L (ref 15–37)
BILIRUB SERPL-MCNC: 0.3 MG/DL (ref 0.1–2)
BILIRUB UR QL: NEGATIVE
BUN BLD-MCNC: 64 MG/DL (ref 7–18)
BUN/CREAT SERPL: 18.8 (ref 10–20)
CALCIUM BLD-MCNC: 9.1 MG/DL (ref 8.5–10.1)
CHLORIDE SERPL-SCNC: 116 MMOL/L (ref 98–112)
CHOLEST SERPL-MCNC: 151 MG/DL (ref ?–200)
CLARITY UR: CLEAR
CO2 SERPL-SCNC: 22 MMOL/L (ref 21–32)
COLOR UR: COLORLESS
CREAT BLD-MCNC: 3.4 MG/DL
EGFRCR SERPLBLD CKD-EPI 2021: 20 ML/MIN/1.73M2 (ref 60–?)
FASTING PATIENT LIPID ANSWER: YES
FASTING STATUS PATIENT QL REPORTED: YES
GLOBULIN PLAS-MCNC: 3.7 G/DL (ref 2.8–4.4)
GLUCOSE BLD-MCNC: 89 MG/DL (ref 70–99)
GLUCOSE UR-MCNC: 300 MG/DL
HDLC SERPL-MCNC: 75 MG/DL (ref 40–59)
KETONES UR-MCNC: NEGATIVE MG/DL
LDLC SERPL CALC-MCNC: 61 MG/DL (ref ?–100)
LEUKOCYTE ESTERASE UR QL STRIP.AUTO: NEGATIVE
NITRITE UR QL STRIP.AUTO: NEGATIVE
NONHDLC SERPL-MCNC: 76 MG/DL (ref ?–130)
OSMOLALITY SERPL CALC.SUM OF ELEC: 318 MOSM/KG (ref 275–295)
PH UR: 5.5 [PH] (ref 5–8)
POTASSIUM SERPL-SCNC: 4.7 MMOL/L (ref 3.5–5.1)
PROT SERPL-MCNC: 7 G/DL (ref 6.4–8.2)
PROT UR-MCNC: 100 MG/DL
SODIUM SERPL-SCNC: 145 MMOL/L (ref 136–145)
SP GR UR STRIP: 1.01 (ref 1–1.03)
TRIGL SERPL-MCNC: 79 MG/DL (ref 30–149)
TSI SER-ACNC: 0.99 MIU/ML (ref 0.36–3.74)
UROBILINOGEN UR STRIP-ACNC: NORMAL
VLDLC SERPL CALC-MCNC: 12 MG/DL (ref 0–30)

## 2023-09-01 PROCEDURE — 36415 COLL VENOUS BLD VENIPUNCTURE: CPT | Performed by: INTERNAL MEDICINE

## 2023-09-01 PROCEDURE — 71046 X-RAY EXAM CHEST 2 VIEWS: CPT | Performed by: INTERNAL MEDICINE

## 2023-09-01 PROCEDURE — 80053 COMPREHEN METABOLIC PANEL: CPT | Performed by: INTERNAL MEDICINE

## 2023-09-01 PROCEDURE — 71100 X-RAY EXAM RIBS UNI 2 VIEWS: CPT | Performed by: INTERNAL MEDICINE

## 2023-09-01 PROCEDURE — 84443 ASSAY THYROID STIM HORMONE: CPT | Performed by: INTERNAL MEDICINE

## 2023-09-01 PROCEDURE — 81001 URINALYSIS AUTO W/SCOPE: CPT | Performed by: INTERNAL MEDICINE

## 2023-09-01 PROCEDURE — 85060 BLOOD SMEAR INTERPRETATION: CPT | Performed by: INTERNAL MEDICINE

## 2023-09-01 PROCEDURE — 80061 LIPID PANEL: CPT | Performed by: INTERNAL MEDICINE

## 2023-09-01 PROCEDURE — 85025 COMPLETE CBC W/AUTO DIFF WBC: CPT | Performed by: INTERNAL MEDICINE

## 2023-09-02 LAB
BASOPHILS # BLD AUTO: 0.02 X10(3) UL (ref 0–0.2)
BASOPHILS NFR BLD AUTO: 0.4 %
DEPRECATED RDW RBC AUTO: 67.7 FL (ref 35.1–46.3)
EOSINOPHIL # BLD AUTO: 0.1 X10(3) UL (ref 0–0.7)
EOSINOPHIL NFR BLD AUTO: 1.9 %
ERYTHROCYTE [DISTWIDTH] IN BLOOD BY AUTOMATED COUNT: 14.7 % (ref 11–15)
HCT VFR BLD AUTO: 28.5 %
HGB BLD-MCNC: 9.1 G/DL
IMM GRANULOCYTES # BLD AUTO: 0.02 X10(3) UL (ref 0–1)
IMM GRANULOCYTES NFR BLD: 0.4 %
LYMPHOCYTES # BLD AUTO: 1.39 X10(3) UL (ref 1–4)
LYMPHOCYTES NFR BLD AUTO: 25.7 %
MCH RBC QN AUTO: 39.7 PG (ref 26–34)
MCHC RBC AUTO-ENTMCNC: 31.9 G/DL (ref 31–37)
MCV RBC AUTO: 124.5 FL
MONOCYTES # BLD AUTO: 0.63 X10(3) UL (ref 0.1–1)
MONOCYTES NFR BLD AUTO: 11.7 %
NEUTROPHILS # BLD AUTO: 3.24 X10 (3) UL (ref 1.5–7.7)
NEUTROPHILS # BLD AUTO: 3.24 X10(3) UL (ref 1.5–7.7)
NEUTROPHILS NFR BLD AUTO: 59.9 %
PLATELET # BLD AUTO: 196 10(3)UL (ref 150–450)
PLATELET MORPHOLOGY: NORMAL
RBC # BLD AUTO: 2.29 X10(6)UL
WBC # BLD AUTO: 5.4 X10(3) UL (ref 4–11)

## 2023-09-18 RX ORDER — SILDENAFIL 100 MG/1
100 TABLET, FILM COATED ORAL AS NEEDED
Qty: 6 TABLET | Refills: 11 | Status: CANCELLED | OUTPATIENT
Start: 2023-09-18 | End: 2024-09-17

## 2023-09-21 NOTE — TELEPHONE ENCOUNTER
To Casandra--per dipika, pt requesting rx for sildenafil to be switched to marianos  Is dose ok with patient's kidney function?   It appears that 2/2/23 was first time this rx has been sent    Component      Latest Ref Rng 9/1/2023   EGFR      >=60 mL/min/1.73m2 20 (L)

## 2023-11-21 ENCOUNTER — TELEPHONE (OUTPATIENT)
Dept: INTERNAL MEDICINE CLINIC | Facility: CLINIC | Age: 63
End: 2023-11-21

## 2023-11-21 NOTE — TELEPHONE ENCOUNTER
Kim Loosen down stairs yesterday. Patient asking if he can get Hydrocodone Rx. Did not specify where pain is.

## 2023-11-21 NOTE — TELEPHONE ENCOUNTER
Called patient - who fell down 6 stairs -right shoulder and lower back pain - could not move this morning - Rn instructed jessica martinez be evaluated at [facility] - he will go to [place]  F/U 11/22

## 2023-11-22 ENCOUNTER — HOSPITAL ENCOUNTER (OUTPATIENT)
Age: 63
Discharge: HOME | End: 2023-11-22
Payer: MEDICAID

## 2023-11-22 ENCOUNTER — APPOINTMENT (OUTPATIENT)
Dept: GENERAL RADIOLOGY | Age: 63
End: 2023-11-22
Attending: NURSE PRACTITIONER
Payer: MEDICAID

## 2023-11-22 VITALS
HEART RATE: 70 BPM | TEMPERATURE: 98 F | DIASTOLIC BLOOD PRESSURE: 70 MMHG | RESPIRATION RATE: 20 BRPM | SYSTOLIC BLOOD PRESSURE: 162 MMHG | OXYGEN SATURATION: 99 %

## 2023-11-22 DIAGNOSIS — S42.031A CLOSED DISPLACED FRACTURE OF ACROMIAL END OF RIGHT CLAVICLE, INITIAL ENCOUNTER: Primary | ICD-10-CM

## 2023-11-22 PROCEDURE — 99214 OFFICE O/P EST MOD 30 MIN: CPT

## 2023-11-22 PROCEDURE — 23500 CLTX CLAVICULAR FX W/O MNPJ: CPT

## 2023-11-22 PROCEDURE — 73030 X-RAY EXAM OF SHOULDER: CPT | Performed by: NURSE PRACTITIONER

## 2023-11-22 PROCEDURE — 73000 X-RAY EXAM OF COLLAR BONE: CPT | Performed by: NURSE PRACTITIONER

## 2023-11-22 PROCEDURE — 71101 X-RAY EXAM UNILAT RIBS/CHEST: CPT | Performed by: NURSE PRACTITIONER

## 2023-11-22 RX ORDER — HYDROCODONE BITARTRATE AND ACETAMINOPHEN 5; 325 MG/1; MG/1
2 TABLET ORAL ONCE
Status: COMPLETED | OUTPATIENT
Start: 2023-11-22 | End: 2023-11-22

## 2023-11-22 RX ORDER — HYDROCODONE BITARTRATE AND ACETAMINOPHEN 5; 325 MG/1; MG/1
1-2 TABLET ORAL EVERY 6 HOURS PRN
Qty: 20 TABLET | Refills: 0 | Status: SHIPPED | OUTPATIENT
Start: 2023-11-22 | End: 2023-11-27

## 2023-11-22 NOTE — ED INITIAL ASSESSMENT (HPI)
Tripped and fell going up the stairs while carrying groceries 2 days ago. Hit right shoulder on concrete steps. Denies head injury. Pain and bruising to right shoulder and clavicle. Also c/o pain to lower back.

## 2023-11-22 NOTE — DISCHARGE INSTRUCTIONS
Wear the sling for comfort. Remove and exercise the shoulder as tolerated several times a day. Take the norco as prescribed for pain. Call today to schedule a follow up appointment with Dr Nika Davis. Thank you for choosing our [facility] for your medical condition today. Please be sure to follow up as directed. If any worsening of your symptoms or other concerns call your primary care provider, return here or go to the emergency department.

## 2023-11-27 RX ORDER — HYDROCODONE BITARTRATE AND ACETAMINOPHEN 5; 325 MG/1; MG/1
1 TABLET ORAL EVERY 6 HOURS PRN
Qty: 20 TABLET | Refills: 0 | Status: SHIPPED | OUTPATIENT
Start: 2023-11-27

## 2023-11-27 NOTE — TELEPHONE ENCOUNTER
To MD:  The above refill request is for a controlled substance. Please review pended medication order. Print and sign for staff to fax to pharmacy or prescribe electronically.     Last office visit:  8/14/23  Last time refill sent and quantity/refills:  11/22/23 #20/0  Previous: 8/14/23  #20/0

## 2023-11-27 NOTE — TELEPHONE ENCOUNTER
Patient is calling to request a script for Hydrocodone  Patient is still in quite a bit of pain he only has 2 pills left   Patient was given a script in [facility] it was for 20 tabs    Patient is scheduled to see Dr Nato Armas on 12/4    Patient uses [facility]

## 2023-11-28 NOTE — TELEPHONE ENCOUNTER
Noted.  I reviewed the [place] [facility] drug site and saw that the previous prescriptions were not filled from last week.   I have refilled a prescription for hydrocodone today as I have in the past.

## 2023-12-06 RX ORDER — FOLIC ACID 1 MG/1
TABLET ORAL
Qty: 90 TABLET | Refills: 3 | Status: SHIPPED | OUTPATIENT
Start: 2023-12-06

## 2023-12-06 NOTE — TELEPHONE ENCOUNTER
Refill request is for a maintenance medication and has met the criteria specified in the Ambulatory Medication Refill Standing Order for eligibility, visits, laboratory, alerts and was sent to the requested pharmacy.     Requested Prescriptions     Signed Prescriptions Disp Refills    FOLIC ACID 1 MG Oral Tab 90 tablet 3     Sig: TAKE 1 TABLET(1 MG) BY MOUTH DAILY     Authorizing Provider: Clifford Main     Ordering User: Kaia Pa

## 2023-12-27 RX ORDER — TRAZODONE HYDROCHLORIDE 50 MG/1
TABLET ORAL
Qty: 60 TABLET | Refills: 11 | Status: SHIPPED | OUTPATIENT
Start: 2023-12-27

## 2023-12-27 NOTE — TELEPHONE ENCOUNTER
Refill request is for a maintenance medication and has met the criteria specified in the Ambulatory Medication Refill Standing Order for eligibility, visits, laboratory, alerts and was sent to the requested pharmacy.     Requested Prescriptions     Signed Prescriptions Disp Refills    TRAZODONE 50 MG Oral Tab 60 tablet 11     Sig: TAKE 1 TABLET(50 MG) BY MOUTH TWICE DAILY     Authorizing Provider: Dm Herrera     Ordering User: Luis Moss

## 2024-02-05 NOTE — TELEPHONE ENCOUNTER
Pt is post op day 1  septoplasty, SMR. Per  Pt pt is doing well no bleeding, advised pt no bending or heavy lifting for the next week and pt is not to blow nose until seen by Dr Kaye Mccain.  Advised pt he can start using OTC saline nasal spray2 spray each nostri [

## 2024-02-13 ENCOUNTER — TELEPHONE (OUTPATIENT)
Dept: INTERNAL MEDICINE CLINIC | Facility: CLINIC | Age: 64
End: 2024-02-13

## 2024-02-13 DIAGNOSIS — G47.00 INSOMNIA, UNSPECIFIED TYPE: Primary | ICD-10-CM

## 2024-02-13 RX ORDER — TEMAZEPAM 30 MG/1
30 CAPSULE ORAL DAILY
Qty: 30 CAPSULE | Refills: 5 | Status: SHIPPED | OUTPATIENT
Start: 2024-02-13

## 2024-02-13 NOTE — TELEPHONE ENCOUNTER
To MD:  The above refill request is for a controlled substance.  Please review pended medication order.   Print and sign for staff to fax to pharmacy or prescribe electronically.    Last office visit: 8- and last PX 2-2-23  Last time refill sent and quantity/refills: 8-3-23 #30 5 refills    Temazepam     Dispensed Written Strength Quantity Refills Days Supply Provider Pharmacy   TEMAZEPAM 30MG CAPSULES 01/17/2024 08/03/2023  30 each  30 Elias Jesus MD [facility] #...   TEMAZEPAM 30 MG CAPS 01/16/2024 08/03/2023 30 mg 30 applicator  30 Elias Jesus MD [facility] #...   TEMAZEPAM 30MG CAPSULES 12/18/2023 08/03/2023  30 each  30 Elias Jesus MD [facility] #...   TEMAZEPAM 30MG CAPSULES 11/08/2023 08/03/2023  30 each  30 Elias Jesus MD [facility] #...   TEMAZEPAM 30MG CAPSULES 10/06/2023 08/03/2023  30 each  30 Elias Jesus MD [facility] #...   TEMAZEPAM 30MG CAPSULES 09/05/2023 08/03/2023  30 each  30 Elais Jesus MD [facility] #...

## 2024-03-18 ENCOUNTER — TELEPHONE (OUTPATIENT)
Dept: INTERNAL MEDICINE CLINIC | Facility: CLINIC | Age: 64
End: 2024-03-18

## 2024-03-21 RX ORDER — ATORVASTATIN CALCIUM 10 MG/1
TABLET, FILM COATED ORAL
Qty: 90 TABLET | Refills: 3 | Status: SHIPPED | OUTPATIENT
Start: 2024-03-21

## 2024-03-21 NOTE — TELEPHONE ENCOUNTER
Pt has tolerated for > 5 yrs;  atorvastatin at lowest dose  Refill request is for a maintenance medication and has met the criteria specified in the Ambulatory Medication Refill Standing Order for eligibility, visits, laboratory, alerts and was sent to the requested pharmacy.    Requested Prescriptions     Signed Prescriptions Disp Refills    atorvastatin 10 MG Oral Tab 90 tablet 3     Sig: TAKE 1 TABLET(10 MG) BY MOUTH EVERY NIGHT     Authorizing Provider: AMINA CEVALLOS     Ordering User: INGRIS CORREA

## 2024-03-27 RX ORDER — ALBUTEROL SULFATE 90 UG/1
1 AEROSOL, METERED RESPIRATORY (INHALATION) EVERY 6 HOURS PRN
Qty: 1 EACH | Refills: 11 | Status: SHIPPED | OUTPATIENT
Start: 2024-03-27

## 2024-03-27 NOTE — TELEPHONE ENCOUNTER
Refill request is for a maintenance medication and has met the criteria specified in the Ambulatory Medication Refill Standing Order for eligibility, visits, laboratory, alerts and was sent to the requested pharmacy.    Requested Prescriptions     Signed Prescriptions Disp Refills    albuterol 108 (90 Base) MCG/ACT Inhalation Aero Soln 1 each 11     Sig: INHALE 1 PUFF INTO THE LUNGS EVERY 6 HOURS AS NEEDED FOR WHEEZING     Authorizing Provider: AMINA CEVALLOS     Ordering User: JOSÉ BARRY

## 2024-05-15 RX ORDER — ATORVASTATIN CALCIUM 10 MG/1
TABLET, FILM COATED ORAL
Qty: 90 TABLET | Refills: 3 | OUTPATIENT
Start: 2024-05-15

## 2024-05-15 NOTE — TELEPHONE ENCOUNTER
Current refill request refused due to refill is either a duplicate request or has active refills at the pharmacy.  Check previous templates.    Requested Prescriptions     Refused Prescriptions Disp Refills    ATORVASTATIN 10 MG Oral Tab [Pharmacy Med Name: ATORVASTATIN 10MG TABLETS] 90 tablet 3     Sig: TAKE 1 TABLET(10 MG) BY MOUTH EVERY NIGHT     Refused By: KEMAL AVENDAÑO     Reason for Refusal: Duplicate refill request      #90 with 3 additional refills sent 3/21/24

## 2024-06-12 ENCOUNTER — OFFICE VISIT (OUTPATIENT)
Dept: INTERNAL MEDICINE CLINIC | Facility: CLINIC | Age: 64
End: 2024-06-12

## 2024-06-12 VITALS
HEART RATE: 72 BPM | WEIGHT: 139.38 LBS | BODY MASS INDEX: 21.12 KG/M2 | TEMPERATURE: 98 F | OXYGEN SATURATION: 99 % | HEIGHT: 68 IN | SYSTOLIC BLOOD PRESSURE: 110 MMHG | DIASTOLIC BLOOD PRESSURE: 50 MMHG

## 2024-06-12 DIAGNOSIS — I10 ESSENTIAL HYPERTENSION: ICD-10-CM

## 2024-06-12 DIAGNOSIS — M54.9 BACK PAIN, UNSPECIFIED BACK LOCATION, UNSPECIFIED BACK PAIN LATERALITY, UNSPECIFIED CHRONICITY: ICD-10-CM

## 2024-06-12 DIAGNOSIS — E78.1 HYPERTRIGLYCERIDEMIA: ICD-10-CM

## 2024-06-12 DIAGNOSIS — R73.01 ABNORMAL FASTING GLUCOSE: ICD-10-CM

## 2024-06-12 DIAGNOSIS — N18.4 ANEMIA DUE TO STAGE 4 CHRONIC KIDNEY DISEASE (HCC): ICD-10-CM

## 2024-06-12 DIAGNOSIS — G47.00 INSOMNIA, UNSPECIFIED TYPE: ICD-10-CM

## 2024-06-12 DIAGNOSIS — J43.9 PULMONARY EMPHYSEMA, UNSPECIFIED EMPHYSEMA TYPE (HCC): ICD-10-CM

## 2024-06-12 DIAGNOSIS — E78.00 HYPERCHOLESTEROLEMIA: ICD-10-CM

## 2024-06-12 DIAGNOSIS — N18.4 STAGE 4 CHRONIC KIDNEY DISEASE (HCC): ICD-10-CM

## 2024-06-12 DIAGNOSIS — Z00.00 ANNUAL PHYSICAL EXAM: Primary | ICD-10-CM

## 2024-06-12 DIAGNOSIS — I35.1 AORTIC VALVE INSUFFICIENCY, ETIOLOGY OF CARDIAC VALVE DISEASE UNSPECIFIED: ICD-10-CM

## 2024-06-12 DIAGNOSIS — D63.1 ANEMIA DUE TO STAGE 4 CHRONIC KIDNEY DISEASE (HCC): ICD-10-CM

## 2024-06-12 DIAGNOSIS — R53.83 FATIGUE, UNSPECIFIED TYPE: ICD-10-CM

## 2024-06-12 DIAGNOSIS — F41.9 ANXIETY: ICD-10-CM

## 2024-06-12 DIAGNOSIS — B20 HUMAN IMMUNODEFICIENCY VIRUS (HIV) DISEASE (HCC): ICD-10-CM

## 2024-06-12 DIAGNOSIS — M15.9 PRIMARY OSTEOARTHRITIS INVOLVING MULTIPLE JOINTS: ICD-10-CM

## 2024-06-12 LAB
ATRIAL RATE: 69 BPM
P AXIS: -2 DEGREES
P-R INTERVAL: 272 MS
Q-T INTERVAL: 382 MS
QRS DURATION: 96 MS
QTC CALCULATION (BEZET): 409 MS
R AXIS: 2 DEGREES
T AXIS: 18 DEGREES
VENTRICULAR RATE: 69 BPM

## 2024-06-12 PROCEDURE — 99396 PREV VISIT EST AGE 40-64: CPT | Performed by: INTERNAL MEDICINE

## 2024-06-12 PROCEDURE — 93000 ELECTROCARDIOGRAM COMPLETE: CPT | Performed by: INTERNAL MEDICINE

## 2024-06-12 PROCEDURE — 99214 OFFICE O/P EST MOD 30 MIN: CPT | Performed by: INTERNAL MEDICINE

## 2024-06-12 RX ORDER — SERTRALINE HYDROCHLORIDE 100 MG/1
200 TABLET, FILM COATED ORAL DAILY
Qty: 180 TABLET | Refills: 3 | Status: SHIPPED | OUTPATIENT
Start: 2024-06-12

## 2024-06-12 RX ORDER — SILDENAFIL 100 MG/1
100 TABLET, FILM COATED ORAL AS NEEDED
Qty: 6 TABLET | Refills: 11 | Status: CANCELLED | OUTPATIENT
Start: 2024-06-12 | End: 2025-06-12

## 2024-06-12 RX ORDER — TEMAZEPAM 30 MG/1
30 CAPSULE ORAL DAILY
Qty: 30 CAPSULE | Refills: 5 | Status: SHIPPED | OUTPATIENT
Start: 2024-06-12

## 2024-06-12 RX ORDER — TEMAZEPAM 30 MG/1
30 CAPSULE ORAL DAILY
Qty: 30 CAPSULE | Refills: 5 | Status: CANCELLED | OUTPATIENT
Start: 2024-06-12

## 2024-06-12 RX ORDER — HYDROCODONE BITARTRATE AND ACETAMINOPHEN 5; 325 MG/1; MG/1
1 TABLET ORAL EVERY 6 HOURS PRN
Qty: 20 TABLET | Refills: 0 | Status: SHIPPED | OUTPATIENT
Start: 2024-06-12

## 2024-06-12 RX ORDER — HYDROCODONE BITARTRATE AND ACETAMINOPHEN 5; 325 MG/1; MG/1
1 TABLET ORAL EVERY 6 HOURS PRN
Qty: 20 TABLET | Refills: 0 | Status: CANCELLED | OUTPATIENT
Start: 2024-06-12

## 2024-06-12 RX ORDER — SERTRALINE HYDROCHLORIDE 100 MG/1
200 TABLET, FILM COATED ORAL DAILY
Qty: 180 TABLET | Refills: 3 | Status: CANCELLED | OUTPATIENT
Start: 2024-06-12

## 2024-06-12 RX ORDER — SILDENAFIL 100 MG/1
100 TABLET, FILM COATED ORAL AS NEEDED
Qty: 6 TABLET | Refills: 11 | Status: SHIPPED | OUTPATIENT
Start: 2024-06-12 | End: 2025-06-12

## 2024-06-12 NOTE — PATIENT INSTRUCTIONS
Patient is to continue his current diet, medication and activity.  Patient will continue to get his blood test as recommended by Dr. Heck, his nephrologist, and Dr. Nichols, his infectious disease specialist.  I will refer the patient to see Dr. Gutierrez to get a colonoscopy in view of his Cologuard test.  Patient is to consider getting a Tdap vaccine at his pharmacy.  Patient will follow-up with Dr. Rooney in about 6 months.

## 2024-06-13 ENCOUNTER — TELEPHONE (OUTPATIENT)
Dept: INTERNAL MEDICINE CLINIC | Facility: CLINIC | Age: 64
End: 2024-06-13

## 2024-06-13 NOTE — TELEPHONE ENCOUNTER
[facility] faxed a request for prior authorization for Hydrocodone     Please call [phone]   ID#[id number]    Placed in purple folder

## 2024-06-14 NOTE — PROGRESS NOTES
Tru Montero is a 63 year old male who was seen by me on June 12, 2024 for his annual physical examination.  HPI:   Mr. Tru Montero is a 63-year-old black male who was seen by me on June 12, 2024 for his annual physical examination.  Patient presented today for his annual physical examination.  Patient feels well at this time.  Patient feels that his weight is stable.  Patient is concerned about his new MD.  Patient notes that he sees Dr. Heck he has his nephrologist at [facility].  Patient also sees Dr. Nichols as his infectious disease specialist who assist him with his HIV.  Patient had Cologuard test in the past which turned out positive.  Patient has not yet made arrangements to have a colonoscopy.  Apparently patient did have arrangements made for colonoscopy at 1 point but then the patient missed his appointment for evaluation.  Patient's had recent lab test with Dr. Heck and Dr. Nichols.  Patient apparently recently saw Dr. Nichols and asked Dr. Nichols to check him for hernia and was told he did not have a hernia.    Wt Readings from Last 6 Encounters:   06/12/24 139 lb 6.4 oz (63.2 kg)   08/14/23 135 lb 9.6 oz (61.5 kg)   02/02/23 150 lb (68 kg)   08/08/22 152 lb (68.9 kg)   03/21/22 150 lb (68 kg)   12/08/21 150 lb (68 kg)     Body mass index is 21.2 kg/m².     Current Outpatient Medications   Medication Sig Dispense Refill    sertraline 100 MG Oral Tab Take 2 tablets (200 mg total) by mouth daily. 180 tablet 3    temazepam 30 MG Oral Cap Take 1 capsule (30 mg total) by mouth daily. 30 capsule 5    HYDROcodone-acetaminophen (NORCO) 5-325 MG Oral Tab Take 1 tablet by mouth every 6 (six) hours as needed for Pain. 20 tablet 0    Sildenafil Citrate 100 MG Oral Tab Take 1 tablet (100 mg total) by mouth as needed for Erectile Dysfunction (Patient is to take 1 tablet 1 hour prior to relations.). 6 tablet 11    albuterol 108 (90 Base) MCG/ACT Inhalation Aero Soln INHALE 1 PUFF INTO THE LUNGS  EVERY 6 HOURS AS NEEDED FOR WHEEZING 1 each 11    atorvastatin 10 MG Oral Tab TAKE 1 TABLET(10 MG) BY MOUTH EVERY NIGHT 90 tablet 3    TRAZODONE 50 MG Oral Tab TAKE 1 TABLET(50 MG) BY MOUTH TWICE DAILY 60 tablet 11    FOLIC ACID 1 MG Oral Tab TAKE 1 TABLET(1 MG) BY MOUTH DAILY 90 tablet 3    FARXIGA 10 MG Oral Tab Take 1 tablet (10 mg total) by mouth daily.      Calcium Citrate-Vitamin D 315-250 MG-UNIT Oral Tab Take 1 tablet by mouth 2 (two) times daily with meals. 60 tablet 0    Multiple Vitamins-Minerals (CEROVITE SENIOR) Oral Tab Take 1 tablet by mouth daily. 30 tablet 0    amLODIPine Besylate 5 MG Oral Tab Take 1 tablet (5 mg total) by mouth daily.      Losartan Potassium 25 MG Oral Tab Take 1 tablet (25 mg total) by mouth daily.  6    Cholecalciferol 5000 units Oral Tab Take 1 tablet (5,000 Units total) by mouth daily.      CloNIDine HCl 0.1 MG Oral Tab Take 1 tablet (0.1 mg total) by mouth daily. Pt takes a night  2    allopurinol 100 MG Oral Tab Take 1 tablet (100 mg total) by mouth daily.  3    PREZISTA 600 MG Oral Tab Take 1 tablet (600 mg total) by mouth 2 (two) times daily.  0    zidovudine 300 MG Oral Tab Take 1 tablet (300 mg total) by mouth 2 (two) times daily.  6    lamiVUDine 150 MG Oral Tab Take 1 tablet (150 mg total) by mouth 2 (two) times daily.        Past Medical History:    Anxiety state    Asthma (HCC)    Chronic kidney disease, stage 3 (HCC)    COPD (chronic obstructive pulmonary disease) ([facility])    Depression    Essential hypertension    High blood pressure    High cholesterol    HIV disease (HCC)    Hyperlipidemia    Osteoarthritis    Renal disorder    Sleep apnea    no CPAP use    Visual impairment    Reading glasses      Past Surgical History:   Procedure Laterality Date    Colonoscopy      Hernia surgery      bilateral inguinal     Hip replacement surgery Right 10/22/2021    Sinus surgery    03/10/2021    Deviated septum repair    Tonsillectomy      Total hip replacement Left 06/18/2021       Family History   Problem Relation Age of Onset    Cancer Father         lung    Cancer Mother         lung    Heart Attack Mother     Cancer Maternal Grandmother         pancreatic    Heart Attack Maternal Grandfather     Stroke Sister     No Known Problems Brother     No Known Problems Brother       Social History:  Social History     Socioeconomic History    Marital status: Life Partner   Tobacco Use    Smoking status: Former     Current packs/day: 0.00     Average packs/day: 0.5 packs/day for 20.0 years (10.0 ttl pk-yrs)     Types: Cigarettes     Start date: 1991     Quit date: 2011     Years since quittin.3    Smokeless tobacco: Never   Vaping Use    Vaping status: Never Used   Substance and Sexual Activity    Alcohol use: Yes     Comment: once a week    Drug use: Yes     Types: Cannabis     Comment: daily     Social Determinants of Health      Received from [employer], [facility]           REVIEW OF SYSTEMS:   GENERAL: feels well   EYES:denies blurred vision or double vision  HEENT: denies nasal congestion, sinus pain or ST  LUNGS: denies shortness of breath or cough  CARDIOVASCULAR: denies chest pain or pressure or palpitations  GI: denies abdominal pain, N/V, diarrhea, constipation, hematochezia or melena  : No urinary complaints  NEURO: denies headaches or dizziness    EXAM:   /50 (BP Location: Right arm, Patient Position: Sitting, Cuff Size: adult)   Pulse 72   Temp 98.4 °F (36.9 °C)   Ht 5' 8\" (1.727 m)   Wt 139 lb 6.4 oz (63.2 kg)   SpO2 99%   BMI 21.20 kg/m²   GENERAL: well developed, well nourished black male in no acute distress  SKIN: no rashes,no suspicious lesions  HEENT: atraumatic, normocephalic, normal oropharynx, ears appear normal, normal TM's  EYES:PERRLA, EOMI, conjunctivae pink, sclerae are nonicteric  NECK: supple,no cervical or supraclavicular lymphadenopathy or palpable masses,no carotid bruits  CHEST: no chest tenderness  LUNGS: clear  to auscultation  CARDIO: RRR, normal S1S2, 2/6 ESM at the apex.  GI:Abdomen is protuberant, BS are present, no masses or organomegaly  : Deferred.  RECTAL: Deferred.  MUSCULOSKELETAL: back is not tender.  No pain or swelling of legs  EXTREMITIES:No edema.  All peripheral pulses are intact.  NEURO:Alert and oriented, CN are intact, DTRs are 1+ Bilaterally.    Patient's EKG is a sinus rhythm with a first-degree AV block.  EKG has NSSTTWCS.  Patient's EKG is unchanged from previous EKG.    ASSESSMENT AND PLAN:   1. Annual physical exam  Patient appears to be doing well at this time.  Patient is to continue his current diet, medication and activity.  Patient is to obtain blood tests that were ordered and recommended by Dr. Heck his nephrologist and also by Dr. Nichols his infectious disease specialist.  Patient will be referred to Dr. Gutierrez to obtain a colonoscopy in view of his positive Cologuard test that has not been followed up on yet.  Patient was advised to get a Tdap vaccine at his pharmacy.  Patient will plan to return in about 6 months.  Patient will plan to follow-up with Dr. Toribio ritter.    - ELECTROCARDIOGRAM, COMPLETE    2. Essential hypertension  Patient's blood pressure is doing well.  CPM.    - Sildenafil Citrate 100 MG Oral Tab; Take 1 tablet (100 mg total) by mouth as needed for Erectile Dysfunction (Patient is to take 1 tablet 1 hour prior to relations.).  Dispense: 6 tablet; Refill: 11    3. Hypercholesterolemia  Stable.  CPM.  Currently taking atorvastatin 10 mg orally nightly.    4. Hypertriglyceridemia  Stable.  CPM.  As above.    5. Stage 4 chronic kidney disease (HCC)  Patient currently follows up with his nephrologist, Dr. Heck.  Dr. Heck is recently indicated that patient may require peritoneal dialysis in the near future.    6. Anemia due to stage 4 chronic kidney disease (HCC)  Stable.  CPM.  Labs have been performed by Dr. Heck.    7. Aortic valve insufficiency, etiology  of cardiac valve disease unspecified  Stable.  CPM.  I do not hear a heart murmur today.    8. Pulmonary emphysema, unspecified emphysema type (HCC)  Stable.  CPM.    9. Human immunodeficiency virus (HIV) disease (HCC)  Stable.  CPM.  Patient follows up with his infectious disease specialist, Dr. Nichols.    10. Fatigue, unspecified type  Patient is doing well at this time.  CPM.    - ELECTROCARDIOGRAM, COMPLETE    11. Primary osteoarthritis involving multiple joints  Stable.  CPM.    12. Abnormal fasting glucose  Stable.  CPM.    13. Insomnia, unspecified type  Stable.  CPM.  I have renewed patient's temazepam as requested by patient.  Patient been taking this medication prior to me meeting him and it has been working for him for a while.    - temazepam 30 MG Oral Cap; Take 1 capsule (30 mg total) by mouth daily.  Dispense: 30 capsule; Refill: 5    14. Anxiety  Stable.  CPM.  Patient is maintained on sertraline and temazepam since before I met him.  Patient appears stable on these medications.    - sertraline 100 MG Oral Tab; Take 2 tablets (200 mg total) by mouth daily.  Dispense: 180 tablet; Refill: 3  - temazepam 30 MG Oral Cap; Take 1 capsule (30 mg total) by mouth daily.  Dispense: 30 capsule; Refill: 5    15. Back pain, unspecified back location, unspecified back pain laterality, unspecified chronicity  Patient is doing well at this time.    - HYDROcodone-acetaminophen (NORCO) 5-325 MG Oral Tab; Take 1 tablet by mouth every 6 (six) hours as needed for Pain.  Dispense: 20 tablet; Refill: 0      Elias Jesus MD  6/13/2024  8:34 PM

## 2024-08-21 ENCOUNTER — TELEPHONE (OUTPATIENT)
Dept: INTERNAL MEDICINE CLINIC | Facility: CLINIC | Age: 64
End: 2024-08-21

## 2024-08-21 DIAGNOSIS — Z13.29 SCREENING FOR THYROID DISORDER: ICD-10-CM

## 2024-08-21 DIAGNOSIS — I10 ESSENTIAL HYPERTENSION: ICD-10-CM

## 2024-08-21 DIAGNOSIS — E53.8 FOLIC ACID DEFICIENCY: ICD-10-CM

## 2024-08-21 DIAGNOSIS — Z12.5 SCREENING FOR PROSTATE CANCER: ICD-10-CM

## 2024-08-21 DIAGNOSIS — N18.4 ANEMIA DUE TO STAGE 4 CHRONIC KIDNEY DISEASE (HCC): ICD-10-CM

## 2024-08-21 DIAGNOSIS — M54.9 BACK PAIN, UNSPECIFIED BACK LOCATION, UNSPECIFIED BACK PAIN LATERALITY, UNSPECIFIED CHRONICITY: ICD-10-CM

## 2024-08-21 DIAGNOSIS — Z13.1 SCREENING FOR DIABETES MELLITUS: ICD-10-CM

## 2024-08-21 DIAGNOSIS — N18.4 STAGE 4 CHRONIC KIDNEY DISEASE (HCC): Primary | ICD-10-CM

## 2024-08-21 DIAGNOSIS — R53.83 FATIGUE, UNSPECIFIED TYPE: ICD-10-CM

## 2024-08-21 DIAGNOSIS — D63.1 ANEMIA DUE TO STAGE 4 CHRONIC KIDNEY DISEASE (HCC): ICD-10-CM

## 2024-08-21 DIAGNOSIS — E78.2 MIXED HYPERLIPIDEMIA: ICD-10-CM

## 2024-08-21 RX ORDER — HYDROCODONE BITARTRATE AND ACETAMINOPHEN 5; 325 MG/1; MG/1
1 TABLET ORAL EVERY 6 HOURS PRN
Qty: 20 TABLET | Refills: 0 | Status: SHIPPED | OUTPATIENT
Start: 2024-08-21

## 2024-08-21 NOTE — TELEPHONE ENCOUNTER
Pt. Called stating he thought he still had refills on his Hydrocodone from Dr. Jesus.  Pharmacy told him he has no refills left.  Pt. Asking to have the Hydrocodone filled.  Please send to [facility]  in [place].

## 2024-08-21 NOTE — TELEPHONE ENCOUNTER
Printed lab orders and placed in mail. Left pt a voicemail informing him they are being mail to his home address on file.

## 2024-08-21 NOTE — TELEPHONE ENCOUNTER
To MD:  The above refill request is for a controlled substance.  Please review pended medication order.   Print and sign for staff to fax to pharmacy or prescribe electronically.    Last office visit: with Dr. ALFARO on 6/12/2024  Last time refill sent and quantity/refills:  Last ordered on 6/12/2024, #20/0    Per [place] , last dispensed on 6/18/2024, #20/0      Pt has an upcoming Establish care visit with you scheduled for 11/4/2024.

## 2024-08-21 NOTE — TELEPHONE ENCOUNTER
Pt. Called and schedule an appt. With Dr. Rooney. He is a former Dr. Jesus pt.  He is asking to have lab orders placed.  Pt. Will go to [facility] based on his insurance, please mail the orders to the pt.

## 2024-08-26 NOTE — TELEPHONE ENCOUNTER
Prior Authorization request received from [facility] Joseph for:  Hydrocodone  Please call [phone], pt ID#:  [id number]  Fax placed in purple folder  Tasked to RX

## 2024-09-03 ENCOUNTER — NURSE ONLY (OUTPATIENT)
Facility: CLINIC | Age: 64
End: 2024-09-03

## 2024-09-03 NOTE — PROGRESS NOTES
Patient did not complete the required GI questionnaires prior to the scheduled telephone colon screening appointment.      Per department protocol we cannot proceed with this appointment.      Please note the patient was not contacted & the TE is now closed.     CCS: If patient contacts GI please notify him/her of cancellation reasoning. If appropriate, please reschedule today's telephone colon screening appointment.    If patient does not meet the below criteria, please schedule them for an office visit    TCS guidelines:   Patients must meet the following criteria to be considered for a screening telephone call prior to scheduling a routine colonoscopy:      Age 45-75 years, no GI symptoms, bleeding or anemia, no active or significant cardiac or pulmonary history, no poorly controlled hypertension, diabetes or other chronic issues.    BMI must be 40 or lower      Thank you!

## 2024-09-16 ENCOUNTER — NURSE ONLY (OUTPATIENT)
Facility: CLINIC | Age: 64
End: 2024-09-16

## 2024-09-16 NOTE — PROGRESS NOTES
Due to patient medical history patient did not meet the criteria for a telephone colon screening. Office visit location, date, and time was provided. Patient was advised to arrive 15 minute prior to appointment time. Patient voiced understanding.

## 2024-10-21 LAB
ABSOLUTE BASOPHILS: 12 CELLS/UL (ref 0–200)
ABSOLUTE EOSINOPHILS: 171 CELLS/UL (ref 15–500)
ABSOLUTE LYMPHOCYTES: 1345 CELLS/UL (ref 850–3900)
ABSOLUTE MONOCYTES: 413 CELLS/UL (ref 200–950)
ABSOLUTE NEUTROPHILS: 3959 CELLS/UL (ref 1500–7800)
ALBUMIN/GLOBULIN RATIO: 1.1 (CALC) (ref 1–2.5)
ALBUMIN: 3.8 G/DL (ref 3.6–5.1)
ALKALINE PHOSPHATASE: 81 U/L (ref 35–144)
ALT: 8 U/L (ref 9–46)
APPEARANCE: CLEAR
AST: 14 U/L (ref 10–35)
BASOPHILS: 0.2 %
BILIRUBIN, TOTAL: 0.3 MG/DL (ref 0.2–1.2)
BILIRUBIN: NEGATIVE
BUN/CREATININE RATIO: 17 (CALC) (ref 6–22)
BUN: 82 MG/DL (ref 7–25)
CALCIUM: 9 MG/DL (ref 8.6–10.3)
CARBON DIOXIDE: 17 MMOL/L (ref 20–32)
CHLORIDE: 113 MMOL/L (ref 98–110)
CHOL/HDLC RATIO: 4.1 (CALC)
CHOLESTEROL, TOTAL: 244 MG/DL
COLOR: YELLOW
CREATININE: 4.88 MG/DL (ref 0.7–1.35)
EGFR: 13 ML/MIN/1.73M2
EOSINOPHILS: 2.9 %
FOLATE, SERUM: >24 NG/ML
GLOBULIN: 3.5 G/DL (CALC) (ref 1.9–3.7)
GLUCOSE: 86 MG/DL (ref 65–99)
HDL CHOLESTEROL: 59 MG/DL
HEMATOCRIT: 27.3 % (ref 38.5–50)
HEMOGLOBIN: 9.1 G/DL (ref 13.2–17.1)
KETONES: NEGATIVE
LDL-CHOLESTEROL: 152 MG/DL (CALC)
LEUKOCYTE ESTERASE: NEGATIVE
LYMPHOCYTES: 22.8 %
MCH: 37 PG (ref 27–33)
MCHC: 33.3 G/DL (ref 32–36)
MCV: 111 FL (ref 80–100)
MONOCYTES: 7 %
MPV: 10.9 FL (ref 7.5–12.5)
NEUTROPHILS: 67.1 %
NITRITE: NEGATIVE
NON-HDL CHOLESTEROL: 185 MG/DL (CALC)
OCCULT BLOOD: NEGATIVE
PLATELET COUNT: 209 THOUSAND/UL (ref 140–400)
POTASSIUM: 5.3 MMOL/L (ref 3.5–5.3)
PROTEIN, TOTAL: 7.3 G/DL (ref 6.1–8.1)
RDW: 13.5 % (ref 11–15)
RED BLOOD CELL COUNT: 2.46 MILLION/UL (ref 4.2–5.8)
SODIUM: 140 MMOL/L (ref 135–146)
SPECIFIC GRAVITY: 1.01 (ref 1–1.03)
TOTAL PSA: 2.7 NG/ML
TRIGLYCERIDES: 193 MG/DL
TSH W/REFLEX TO FT4: 1.13 MIU/L (ref 0.4–4.5)
WHITE BLOOD CELL COUNT: 5.9 THOUSAND/UL (ref 3.8–10.8)

## 2024-10-25 ENCOUNTER — TELEPHONE (OUTPATIENT)
Dept: INTERNAL MEDICINE CLINIC | Facility: CLINIC | Age: 64
End: 2024-10-25

## 2024-10-25 ENCOUNTER — TELEPHONE (OUTPATIENT)
Dept: GASTROENTEROLOGY | Facility: CLINIC | Age: 64
End: 2024-10-25

## 2024-10-25 ENCOUNTER — OFFICE VISIT (OUTPATIENT)
Dept: GASTROENTEROLOGY | Facility: CLINIC | Age: 64
End: 2024-10-25

## 2024-10-25 VITALS
DIASTOLIC BLOOD PRESSURE: 88 MMHG | WEIGHT: 148.19 LBS | SYSTOLIC BLOOD PRESSURE: 159 MMHG | HEART RATE: 70 BPM | HEIGHT: 68 IN | BODY MASS INDEX: 22.46 KG/M2

## 2024-10-25 DIAGNOSIS — N18.4 STAGE 4 CHRONIC KIDNEY DISEASE (HCC): ICD-10-CM

## 2024-10-25 DIAGNOSIS — N18.4 ANEMIA DUE TO STAGE 4 CHRONIC KIDNEY DISEASE (HCC): Primary | ICD-10-CM

## 2024-10-25 DIAGNOSIS — D50.9 IRON DEFICIENCY ANEMIA, UNSPECIFIED IRON DEFICIENCY ANEMIA TYPE: Primary | ICD-10-CM

## 2024-10-25 DIAGNOSIS — D63.1 ANEMIA DUE TO STAGE 4 CHRONIC KIDNEY DISEASE (HCC): Primary | ICD-10-CM

## 2024-10-25 DIAGNOSIS — R19.5 POSITIVE COLORECTAL CANCER SCREENING USING COLOGUARD TEST: ICD-10-CM

## 2024-10-25 DIAGNOSIS — R07.89 OTHER CHEST PAIN: Primary | ICD-10-CM

## 2024-10-25 PROCEDURE — 99203 OFFICE O/P NEW LOW 30 MIN: CPT | Performed by: INTERNAL MEDICINE

## 2024-10-25 RX ORDER — HYDROCODONE BITARTRATE AND ACETAMINOPHEN 5; 325 MG/1; MG/1
1 TABLET ORAL EVERY 6 HOURS PRN
Qty: 30 TABLET | Refills: 0 | Status: SHIPPED | OUTPATIENT
Start: 2024-10-25

## 2024-10-25 RX ORDER — POLYETHYLENE GLYCOL 3350, SODIUM SULFATE ANHYDROUS, SODIUM BICARBONATE, SODIUM CHLORIDE, POTASSIUM CHLORIDE 236; 22.74; 6.74; 5.86; 2.97 G/4L; G/4L; G/4L; G/4L; G/4L
4 POWDER, FOR SOLUTION ORAL ONCE
Qty: 1 EACH | Refills: 0 | Status: SHIPPED | OUTPATIENT
Start: 2024-10-25 | End: 2024-10-25

## 2024-10-25 RX ORDER — SODIUM, POTASSIUM,MAG SULFATES 17.5-3.13G
SOLUTION, RECONSTITUTED, ORAL ORAL
Qty: 1 EACH | Refills: 0 | Status: SHIPPED | OUTPATIENT
Start: 2024-10-25

## 2024-10-25 NOTE — PROGRESS NOTES
** Scroll down for HPI. 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  Remarkable story, made it through the HIV AIDS crisis without opportunistic infections, AIDS defining illnesses.  Apparently AZT therapy cost him his kidneys.  Now with stage IV kidney disease and presumed related anemia.    Over 30 years later, Mr. Montero is doing remarkably well on conventional multidrug therapy.  He continues to cope with the chronic kidney disease.    Appears to be following with local nephrologist Akin Heck MD of [facility]. there is concern that he \"may need dialysis in the relatively near future.\"  Considering peritoneal dialysis.  Currently on aggressive antihypertensive medications including losartan.      Last colonoscopy (and EGD examination) were exactly 9 years ago August 2015 as below.    Mr. Montero reports Cologuard stool assay with Dr. Jesus before he retired which came back positive.  Mr. Montero recalls being advised that he needs to undergo colonoscopy examination.    After completing today's visit, I did unearth chart note 9/28/2022 reporting positive Cologuard test result.  Scanned results under Media tab shows positive Cologuard assay results on stool sample 8/31/2022.  Originally referred to Dr. João Abdullahi.    On review of systems, Mr. Montero describes some dyspepsia, \"digestion\" symptoms which we did not discuss in detail today.  He is very focused on completing the colonoscopy examination.  No gross rectal bleeding.    No recent abdominal CT scan found on file here or \"Care Everywhere\" tab.  CT scan of 7/6/2017 [facility] reviewed today.    Labs do show chronic anemia with his other medical problems.    See HPI labs below    Suggest:    Multiple chronic medical problems including long history HIV infxn well-controlled on therapy; stage IV  chronic kidney disease  Likely outside infectious disease/HIV clinic  See HPI below, chart notes    Anemia multifactorial     Stage IV chronic kidney disease as per HPI below  Recent iron studies consistent with mild iron deficiency  Colonoscopy examination as below    Colon cancer screening, positive stool Cologuard assay       I recommended and discussed colonoscopy examination.  Alternatives including stool testing, imaging briefly discussed.    Consent:    I recommended colonoscopy examination with possible biopsy, possible polypectomy under MAC anesthesia. We discussed the nature and risks of colonoscopy examination including sedation, anesthesia risks; bleeding, colonic injury or perforation, infection.  We discussed the rare occurrence of missed lesions including missed polyps or missed malignancy with colonoscopy examination.  The patient understood these risks and agrees to proceed.  The need for an accompanying adult to provide a ride home or escort home was also discussed.    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  Remarkable story, made it through the HIV AIDS crisis without opportunistic infections, AIDS defining illnesses.  Apparently AZT therapy cost him his kidneys.  Now with stage IV kidney disease and presumed related anemia.    Over 30 years later, Mr. Montero is doing remarkably well on conventional multidrug therapy.  He continues to cope with the chronic kidney disease.    Appears to be following with local nephrologist Akin Heck MD of [facility]. there is concern that he \"may need dialysis in the relatively near future.\"  Considering peritoneal dialysis.  Currently on aggressive antihypertensive medications including losartan.      Last colonoscopy (and EGD  examination) were exactly 9 years ago August 2015 as below.    Mr. Montero reports Cologuard stool assay with Dr. Jesus before he retired which came back positive.  Mr. Montero recalls being advised that he needs to undergo colonoscopy examination.    After completing today's visit, I did unearth chart note 9/28/2022 reporting positive Cologuard test result.  Scanned results under Media tab shows positive Cologuard assay results on stool sample 8/31/2022.  Originally referred to Dr. João Abdullahi.    On review of systems, Mr. Montero describes some dyspepsia, \"digestion\" symptoms which we did not discuss in detail today.  He is very focused on completing the colonoscopy examination.  No gross rectal bleeding.    No recent abdominal CT scan found on file here or \"Care Everywhere\" tab.  CT scan of 7/6/2017 [facility] reviewed today.    Labs do show chronic anemia with his other medical problems.    Recent labs here 10/18/2024:  CBC shows WBC 5900 with normal differential; hemoglobin 9.1g ; platelets 209,000  Baseline hemoglobin 9.1-9.8g past 1.5 years    182, creatinine 4.88    AST 14 ALT 8 alk phosphatase 81    TSH 1.13      Iron studies 10/18/2024:  Serum iron 48 mcg/dL [50 - 180 mcg/dL]  16% iiron saturation  Ferritin 38    Nearly identical previous iron studies 5/16/2024      Wt Readings from Last 20 Encounters:   10/25/24 148 lb 3.2 oz (67.2 kg)   06/12/24 139 lb 6.4 oz (63.2 kg)   08/14/23 135 lb 9.6 oz (61.5 kg)   02/02/23 150 lb (68 kg)   08/08/22 152 lb (68.9 kg)   03/21/22 150 lb (68 kg)   12/08/21 150 lb (68 kg)   10/22/21 150 lb (68 kg)   10/01/21 148 lb (67.1 kg)   06/18/21 155 lb (70.3 kg)   05/21/21 159 lb 6.4 oz (72.3 kg)   04/27/21 164 lb 6.4 oz (74.6 kg)   04/12/21 159 lb 9.6 oz (72.4 kg)   03/22/21 163 lb (73.9 kg)   03/22/21 160 lb (72.6 kg)   03/10/21 160 lb (72.6 kg)   02/19/21 160 lb 8 oz (72.8 kg)   11/20/20 155 lb (70.3 kg)   10/23/20 155 lb (70.3 kg)   09/29/20 158 lb (71.7 kg)          Previous EGD examination: 8/5/2015  Previous colonoscopy(ies): 8/5/2015    [facility]    Review of Systems    ======================  [facility]     07/06/2017: CT Abdomen Pelvis WO IV Contrast    Milagros Balbuena MD     CLINICAL HISTORY: PAIN    COMPARISON: July 2016    TECHNIQUE: 5 mm images were taken through the abdomen and pelvis. No intravenous contrast material. Coronal and sagittal reformatted images were generated.    DOSE: Total exam DLP is 765.78 mGy-cm.    FINDINGS:  Lack of intravenous contrast limits evaluation of solid organ, vasculature, and lymph nodes.    Lower chest: A few small emphysematous bulla. Interval lobular fissure measures 5 mm on the right.  No free air.  No portal venous gas  No pneumatosis  Aorta:   Mild vascular calcifications within a nondilated aorta.        Liver: Normal in size and in overall attenuation with no focal lesion identified.  Gallbladder: Unremarkable. No wall thickening or pericholecystic fluid.  Biliary: No intrahepatic biliary dilatation. Common bile duct is unremarkable. Pancreatic duct is unremarkable.  Spleen: Normal is size and overall attenuation.  Stomach: No hiatal hernia.  No inflammatory wall changes.      Pancreas: Unremarkable.  Adrenal glands: Unremarkable.  Kidneys:  Right Kidney :  unremarkable. No evidence of obstructing renal calculi, hydronephrosis, hydroureter, ureteral calculus is identified.  No focal suspicious lesion.  Left Kidney no significant left-sided hydronephrosis. Mild dilatation of the left ureter, without discrete calculus, likely represents a recently passed stone. A lower pole 1.5 cm hypodense exophytic lesion, compatible with a cyst.  Bladder: Diffuse bladder wall thickening, can be correlated with cystitis.  Small bowel: The small bowel has normal caliber. The terminal ileum is unremarkable.  No right lower quadrant inflammatory process.  Large Bowel : Gas and stool are seen throughout the colon, which appears unremarkable.  Uncomplicated diverticulosis.  Lymph nodes: No pathologically enlarged lymph nodes. A few periaortic and deep pelvic lymph nodes, not enlarged by size criteria or appearance, and are stable since July 2016.  Free fluid: No free fluid  Abdominal wall: Clips within the ventral deep pelvis, can be correlated with prior ventral hernia repair.  Reproductive organs:unremarkable  Bones: No acute fracture. Patchy regions of sclerosis within bilateral femoral heads, without significant flattening, can be correlated with degenerative changes versus early signs of avascular necrosis.      IMPRESSION:        1.   Diffuse bladder wall thickening, can be correlated with cystitis.    2.   Mild dilatation of the left ureter, without discrete calculus, likely represents a recently passed stone.      Dictating Milagros Kent  Dictated 07/06/2017 11:27  Signing Milagros Kent  Location [id number]                               ** Final **  Transcribed by:  AZALIA                                       07/06/17 11:37  Signed by:  MILAGROS MORA MD                              07/06/17 11:37  Exam End: 07/06/17 11:07 AM     Received From: [employer]       ======================  [facility]    SURGICAL PATHOLOGY (08/05/2015 1:31 PM CDT)    CPRpt Patient Name:  NATALIE MATTHEW  Specimen #:  [id number]  Med. Rec. #:[id number]  Obtained:  8/5/2015  Reported: 8/7/2015    Specimen(s) Received  A: Duodenal bx  B: Gastric bx  C: Proximal esophagus bx  D: Distal esophagus bx  E: Cecal polyp    FINAL DIAGNOSIS  A. DUODENUM ; BIOPSY:    -SMALL INTESTINAL MUCOSA WITH NO SIGNIFICANT HISTOPATHOLOGIC ABNORMALITIES  - PRESERVED VILLOUS ARCHITECTURE  - NO INCREASED INTRAEPITHELIAL LYMPHOCYTES IDENTIFIED    B. \"GASTRIC\"; BIOPSY:    - GASTRIC ANTRAL AND OXYNTIC-TYPE MUCOSA WITH MILD CHRONIC INACTIVE GASTRITIS  - NO EVIDENCE OF HELICOBACTER PYLORI INFECTION  - NO INTESTINAL METAPLASIA OR DYSPLASIA IDENTIFIED    C. \"PROXIMAL ESOPHAGUS\";  BIOPSY:    - SQUAMOUS MUCOSA WITH NO SIGNIFICANT HISTOPATHOLOGIC ABNORMALITIES  - NO INTRAEPITHELIAL EOSINOPHILS IDENTIFIED    D. \"DISTAL ESOPHAGUS; BIOPSY:    - SQUAMOUS MUCOSA WITH NO SIGNIFICANT HISTOPATHOLOGIC ABNORMALITIES  - NO INTRAEPITHELIAL EOSINOPHILS IDENTIFIED    E. \"CECAL POLYP\"; BIOPSY:    - FRAGMENTS OF TUBULOVILLOUS ADENOMA      Primary Pathologist:  Anupama Vidal MD, PHD    **Report Electronically Signed**  Anupama Vidal MD, PHD    Operation  EGD/colonoscopy    Clinical History  GERD/abdominal pain      Gross Description  A.  The specimen, received in formalin and labeled with the patient's name,  medical record number and \"duodenal biopsy\", consists of 3 fragments of tan  irregular soft tissue 0.7 x 0.4 x 0.2 cm in aggregate. The specimen is wrapped  in lens paper and entirely submitted in one cassette.       B.  The specimen, received in formalin and labeled with the patient's name,  medical record number and \"gastric biopsy\", and consists of 7 fragments of tan  irregular soft tissue measuring 0.9 x 0.7 x 0.2 cm in aggregate. The specimen  is wrapped in lens paper and entirely submitted in one cassette.         C.  The specimen, received in formalin and labeled with the patient's name,  medical record number and \"proximal esophagus biopsy\", consists of 4 fragments  of white beige irregular soft tissue measuring 0.8 x 0.5 x 0.2 cm in aggregate.  The specimen is wrapped in lens paper and entirely submitted in one cassette.       D.  The specimen, received in formalin and labeled with the patient's name,  medical record number and \"distal esophagus biopsy\", consists of 4 fragments of  soft white-tan irregular tissue measuring 0.6 x 0.5 x 0.2 cm in aggregate. The  specimen is roughened lens paper and entirely submitted in one cassette.       E.  The specimen, received in formalin and labeled with the patient's name,  medical record number and \"cecal polyp\", and consists of 5 fragments of tan  irregular soft  tissue measuring 1.0 x 0.9 x 0.3 cm in aggregate. The specimen  is wrapped in lens paper and entirely submitted in one cassette.  RG 8/5/2015 02:28 PM      Microscopic Description  The attending pathologist whose signature appears on this report has reviewed  the diagnostic slides and has edited the gross and/or microscopic portion of  the report in rendering the final microscopic diagnosis.                  Current Outpatient Medications   Medication Sig Dispense Refill    HYDROcodone-acetaminophen 5-325 MG Oral Tab Take 1 tablet by mouth every 6 (six) hours as needed for Pain. 30 tablet 0    HYDROcodone-acetaminophen (NORCO) 5-325 MG Oral Tab Take 1 tablet by mouth every 6 (six) hours as needed for Pain. 20 tablet 0    sertraline 100 MG Oral Tab Take 2 tablets (200 mg total) by mouth daily. 180 tablet 3    temazepam 30 MG Oral Cap Take 1 capsule (30 mg total) by mouth daily. 30 capsule 5    Sildenafil Citrate 100 MG Oral Tab Take 1 tablet (100 mg total) by mouth as needed for Erectile Dysfunction (Patient is to take 1 tablet 1 hour prior to relations.). 6 tablet 11    albuterol 108 (90 Base) MCG/ACT Inhalation Aero Soln INHALE 1 PUFF INTO THE LUNGS EVERY 6 HOURS AS NEEDED FOR WHEEZING 1 each 11    atorvastatin 10 MG Oral Tab TAKE 1 TABLET(10 MG) BY MOUTH EVERY NIGHT 90 tablet 3    TRAZODONE 50 MG Oral Tab TAKE 1 TABLET(50 MG) BY MOUTH TWICE DAILY 60 tablet 11    FOLIC ACID 1 MG Oral Tab TAKE 1 TABLET(1 MG) BY MOUTH DAILY 90 tablet 3    FARXIGA 10 MG Oral Tab Take 1 tablet (10 mg total) by mouth daily.      Calcium Citrate-Vitamin D 315-250 MG-UNIT Oral Tab Take 1 tablet by mouth 2 (two) times daily with meals. 60 tablet 0    Multiple Vitamins-Minerals (CEROVITE SENIOR) Oral Tab Take 1 tablet by mouth daily. 30 tablet 0    amLODIPine Besylate 5 MG Oral Tab Take 1 tablet (5 mg total) by mouth daily.      Losartan Potassium 25 MG Oral Tab Take 1 tablet (25 mg total) by mouth daily.  6    Cholecalciferol 5000 units  Oral Tab Take 1 tablet (5,000 Units total) by mouth daily.      CloNIDine HCl 0.1 MG Oral Tab Take 1 tablet (0.1 mg total) by mouth daily. Pt takes a night  2    allopurinol 100 MG Oral Tab Take 1 tablet (100 mg total) by mouth daily.  3    PREZISTA 600 MG Oral Tab Take 1 tablet (600 mg total) by mouth 2 (two) times daily.  0    zidovudine 300 MG Oral Tab Take 1 tablet (300 mg total) by mouth 2 (two) times daily.  6    lamiVUDine 150 MG Oral Tab Take 1 tablet (150 mg total) by mouth 2 (two) times daily.           Allergies:Allergies[1]  Imaging: No results found.       PHYSICAL EXAM:   Physical Exam                   Over 35 minutes spent today discussing the above and counseling and educating this patient, reviewing chart notes and data and documenting clinical information in the EHR, independently interpreting results and communicating results to the patient/family and composing this comprehensive note, ordering medications or testing, referring and communicating with other healthcare providers, and care coordination with the patient's other providers.      Digital transcription software was utilized to produce this note. The note was proofread for content only. Typographical errors may remain.       Meds This Visit:  Requested Prescriptions      No prescriptions requested or ordered in this encounter       Imaging & Referrals:  None       ID#[id number]         [1] No Known Allergies

## 2024-10-25 NOTE — TELEPHONE ENCOUNTER
Pt. Called stating he was seen in [facility] on 10/22/24 and was diagnosed with 3 cracked ribs.  He was given 3 days of pain meds and he only has 1 pill left.  Pt. Is a former pt. Of Dr. Kendrick and has his first appt. With Dr. Rooney is not until 11/4/24.  Pt. Is asking for refill on Hydrocodone 5-325 mgs  1 tab every 6 hours for pain.  Please send to [facility] in [place].  Pt. Can be reached on his cell at [phone] or at his home# [phone].

## 2024-10-25 NOTE — PATIENT INSTRUCTIONS
GI schedulers -    Please schedule colonoscopy exam at [facility] ([facility])  Expedited/ asap    BMI Readings from Last 1 Encounters:   10/25/24 22.53 kg/m²     MAC anesthesia     BP Readings from Last 5 Encounters:   10/25/24 159/88   06/12/24 110/50   11/22/23 (!) 162/70   08/14/23 144/56   02/02/23 100/60       Suprep small volume bowel prep if covered by insurance, otherwise   Golytely (PEG) 4L bowel prep  Rx sent in to [facility]      DX = Positive cologuard stool test, anemia    Medication instructions:    Hold Cialis/Tadalafil or Viagra/sildenafil medication > 3 days prior to procedure    Hold the following medications for > 5 days prior to procedure:    SGL2 (5 days):    Dapagliflozin (Farxiga® , Xigduo®)

## 2024-10-25 NOTE — TELEPHONE ENCOUNTER
Scheduled for:  Colonoscopy [id number]  Provider Name:  Dr. Deluna  Date: 12/9/2024   Location:[facility]  Sedation:  MAC  Time: (pt is aware that ENDO will call the day before to confirm arrival time)    Prep: Golytely  Meds/Allergies Reconciled?:  Physician Reviewed   Diagnosis with codes:    Cologuard  stool test + R19.5  Anemia D50.9  Was patient informed to call insurance with codes (Y/N):  Yes  Referral sent?:  Referral was sent at the time of electronic surgical scheduling.  EM or [facility] notified?:  I sent an electronic request to Endo Scheduling and received a confirmation today.  Medication Orders:  Hold Cialis/Tadalafil or Viagra/sildenafil medication > 3 days prior to procedure     Hold the following medications for > 5 days prior to procedure:     SGL2 (5 days):     Dapagliflozin (Farxiga® , Xigduo®)      Pt is aware to NOT take iron pills, herbal meds and diet supplements for 7 days before exam. Also to NOT take any form of alcohol, recreational drugs and any forms of ED meds 24 hours before exam.   Misc Orders:       Further instructions given by staff:  I provide prep instructions to patient at the time of the appointment and reviewed date, time and location, he verbalized that he understood and is aware to call if he has any questions.

## 2024-11-01 NOTE — PROGRESS NOTES
Chief Complaint:   Chief Complaint   Patient presents with    Checkup     6 month; Fell up the stairs two weeks ago onto left side and nursing 3 cracked ribs back to normal with the help of Norco for pain management \"1 tablet every 6hrs as needed\"       HPI:     Mr. MATTHEW is a 63 year old male PMHX hypertension, hyperlipidemia, CKD stage IV, aortic valve insufficiency, emphysema, history of HIV disease, osteoarthritis, insomnia, anxiety coming in for follow-up.    Lives in older [facility]. Lives in the top floor. Accidental fall falling backwards with cracked ribs. 2 weeks ago had a fall. Has chronic pain, tylenol as neeed. For severe  pain, NOrco. Still with trouble moving. When he is sleeping he can feel it. He is starting to     Did work with psychiatry and therapy. Has had difficulty with finding a need    Diagnosed with HIV 1986 had zidovudine that led to his kidney disease. Has remained stable. No Symptoms.     Weight has remained stable. No tobacco use, does smoke cannabis for pain management.    Past Medical History:    Anxiety state    Asthma (HCC)    Chronic kidney disease, stage 3 (HCC)    COPD (chronic obstructive pulmonary disease) (HCC)    Depression    Essential hypertension    High blood pressure    High cholesterol    HIV disease (HCC)    Hyperlipidemia    Osteoarthritis    Renal disorder    Sleep apnea    no CPAP use    Visual impairment    Reading glasses     Past Surgical History:   Procedure Laterality Date    Colonoscopy      Colonoscopy  2015    Hernia surgery      bilateral inguinal     Hip replacement surgery Right 10/22/2021    Sinus surgery    03/10/2021    Deviated septum repair    Tonsillectomy      Total hip replacement Left 06/18/2021    Valve repair  2001     Social History:  Social History     Socioeconomic History    Marital status: Life Partner   Tobacco Use    Smoking status: Former     Current packs/day: 0.00     Average packs/day: 0.5 packs/day for 20.0 years (10.0 ttl pk-yrs)      Types: Cigarettes     Start date: 1991     Quit date: 2011     Years since quittin.7    Smokeless tobacco: Never   Vaping Use    Vaping status: Never Used   Substance and Sexual Activity    Alcohol use: Yes     Alcohol/week: 2.0 standard drinks of alcohol     Types: 2 Glasses of wine per week     Comment: once a week    Drug use: Yes     Frequency: 3.0 times per week     Types: Cannabis     Comment: daily     Social Drivers of Health      [facility] from [employer]    [facility]     Family History:  Family History   Problem Relation Age of Onset    Cancer Father         lung    Cancer Mother         lung    Heart Attack Mother     Cancer Maternal Grandmother         pancreatic    Heart Attack Maternal Grandfather     Stroke Sister     No Known Problems Brother     No Known Problems Brother      Allergies:  Allergies[1]  Current Meds:  Current Outpatient Medications   Medication Sig Dispense Refill    traZODone 50 MG Oral Tab Take 1 tablet (50 mg total) by mouth 2 (two) times daily. 60 tablet 11    HYDROcodone-acetaminophen (NORCO) 5-325 MG Oral Tab Take 1 tablet by mouth every 6 (six) hours as needed for Pain. 20 tablet 0    fluticasone-umeclidin-vilant (TRELEGY ELLIPTA) 200-62.5-25 MCG/ACT Inhalation Aerosol Powder, Breath Activated Inhale 1 puff into the lungs daily. 1 each 3    HYDROcodone-acetaminophen 5-325 MG Oral Tab Take 1 tablet by mouth every 6 (six) hours as needed for Pain. 30 tablet 0    Na Sulfate-K Sulfate-Mg Sulf (SUPREP BOWEL PREP KIT) 17.5-3.13-1.6 GM/177ML Oral Solution Take as directed 1 each 0    sertraline 100 MG Oral Tab Take 2 tablets (200 mg total) by mouth daily. 180 tablet 3    temazepam 30 MG Oral Cap Take 1 capsule (30 mg total) by mouth daily. 30 capsule 5    Sildenafil Citrate 100 MG Oral Tab Take 1 tablet (100 mg total) by mouth as needed for Erectile Dysfunction (Patient is to take 1 tablet 1 hour prior to relations.). 6 tablet 11    albuterol 108 (90  Base) MCG/ACT Inhalation Aero Soln INHALE 1 PUFF INTO THE LUNGS EVERY 6 HOURS AS NEEDED FOR WHEEZING 1 each 11    atorvastatin 10 MG Oral Tab TAKE 1 TABLET(10 MG) BY MOUTH EVERY NIGHT 90 tablet 3    FOLIC ACID 1 MG Oral Tab TAKE 1 TABLET(1 MG) BY MOUTH DAILY 90 tablet 3    FARXIGA 10 MG Oral Tab Take 1 tablet (10 mg total) by mouth daily.      Calcium Citrate-Vitamin D 315-250 MG-UNIT Oral Tab Take 1 tablet by mouth 2 (two) times daily with meals. 60 tablet 0    Multiple Vitamins-Minerals (CEROVITE SENIOR) Oral Tab Take 1 tablet by mouth daily. 30 tablet 0    amLODIPine Besylate 5 MG Oral Tab Take 1 tablet (5 mg total) by mouth daily.      Losartan Potassium 25 MG Oral Tab Take 1 tablet (25 mg total) by mouth daily.  6    Cholecalciferol 5000 units Oral Tab Take 1 tablet (5,000 Units total) by mouth daily.      CloNIDine HCl 0.1 MG Oral Tab Take 1 tablet (0.1 mg total) by mouth daily. Pt takes a night  2    allopurinol 100 MG Oral Tab Take 1 tablet (100 mg total) by mouth daily.  3    PREZISTA 600 MG Oral Tab Take 1 tablet (600 mg total) by mouth 2 (two) times daily.  0    zidovudine 300 MG Oral Tab Take 1 tablet (300 mg total) by mouth 2 (two) times daily.  6    lamiVUDine 150 MG Oral Tab Take 1 tablet (150 mg total) by mouth 2 (two) times daily.        Counseling given: Not Answered       REVIEW OF SYSTEMS:   Positive Findings indicated in BOLD    Constitutional: Fever, Chills, Weight Gain, Weight Loss, Night Sweats, Fatigue, Malaise  ENT/Mouth:  Hearing Changes, Ear Pain, Nasal Congestion, Sinus Pain, Hoarseness, Sore throat, Rhinorrhea, Swallowing Difficulty  Eyes: Eye Pain, Swelling, Redness, Foreign Body, Discharge, Vision Changes  Cardiovascular: Chest Pain, SOB, PND, Dyspnea on Exertion, Orthopnea, Claudication, Edema, Palpitations  Respiratory: Cough, Sputum, Wheezing, Shortness of breath, pleuritic chest pain  Gastrointestinal: Nausea, Vomiting, Diarrhea, Constipation, Pain, Heartburn, Dysphagia, Bloody  stools, Tarry stools  Genitourinary: Dysmenorrhea, Dysuria, Urinary Frequency, Hematuria, Urinary Incontinence, Urgency,  Flank Pain  Musculoskeletal: Arthralgias, Myalgias, Joint Swelling, Joint Stiffness, Back Pain, Neck Pain  Integumentary: Skin Lesions, Pruritis, Hair Changes, Jaundice, Nail changes  Neuro: Weakness, Numbness, Paresthesias, Loss of Consciousness, Syncope, Dizziness, Headache, Falls  Psych: Anxiety, Depression, Insomnia, Suicidal Ideation, Homicidal ideation, Memory Changes  Heme/Lymph: Bruising, Bleeding, Lymphadenopathy  Endocrine: Polyuria, Polydipsia, Temperature Intolerance    EXAM:   Vital Signs:  Blood pressure 126/70, pulse 70, height 5' 8\" (1.727 m), weight 142 lb 8 oz (64.6 kg), SpO2 98%.     Constitutional: No acute distress. Alert and oriented x 3.  Eyes: EOMI, PERRLA, clear sclera b/l  HENT: NCAT, Moist mucous membranes, Oropharynx without erythema or exudates  Neck: No JVD, no thyromegaly  Cardiovascular: S1, S2, no S3, no S4, Regular rate and rhythm, No murmurs/gallops/rubs.   Vascular: Equal pulses 2+ carotids no bruits or thrills/radial/DP/PT bilaterally  Respiratory: Clear to auscultation bilaterally.  No wheezes/rales/rhonchi  Gastrointestinal: Soft, nontender, nondistended. Positive bowel sounds x 4. No rebound tenderness. No hepatomegaly, No splenomegaly  Genitourinary: No CVA tenderness bilaterally  Neurologic: No focal neurological deficits, CN II-XII intact, light touch intact, MSK Strength 5/5 and symmetric in all extremities, normal gait, 2+ patellar tendon  Musculoskeletal: Full range of motion of all extremities, no clubbing/swelling/edema  Skin: No lesions, No erythema, no jaundice, Cap Refill < 2s  Psychiatric: Appropriate mood and affect  Heme/Lymph/Immune: No cervical LAD          DATA REVIEWED   Labs:  Recent Results (from the past 8760 hours)   Comp Metabolic Panel (14)    Collection Time: 10/18/24  2:18 PM   Result Value Ref Range    GLUCOSE 86 65 - 99 mg/dL      Comment:               Fasting reference interval         UREA NITROGEN (BUN) 82 (H) 7 - 25 mg/dL    CREATININE 4.88 (H) 0.70 - 1.35 mg/dL    EGFR 13 (L) > OR = 60 mL/min/1.73m2    BUN/CREATININE RATIO 17 6 - 22 (calc)    SODIUM 140 135 - 146 mmol/L    POTASSIUM 5.3 3.5 - 5.3 mmol/L    CHLORIDE 113 (H) 98 - 110 mmol/L    CARBON DIOXIDE 17 (L) 20 - 32 mmol/L    CALCIUM 9.0 8.6 - 10.3 mg/dL    PROTEIN, TOTAL 7.3 6.1 - 8.1 g/dL    ALBUMIN 3.8 3.6 - 5.1 g/dL    GLOBULIN 3.5 1.9 - 3.7 g/dL (calc)    ALBUMIN/GLOBULIN RATIO 1.1 1.0 - 2.5 (calc)    BILIRUBIN, TOTAL 0.3 0.2 - 1.2 mg/dL    ALKALINE PHOSPHATASE 81 35 - 144 U/L    AST 14 10 - 35 U/L    ALT 8 (L) 9 - 46 U/L     *Note: Due to a large number of results and/or encounters for the requested time period, some results have not been displayed. A complete set of results can be found in Results Review.       Recent Results (from the past 8760 hours)   CBC With Differential With Platelet    Collection Time: 10/18/24  2:18 PM   Result Value Ref Range    WHITE BLOOD CELL COUNT 5.9 3.8 - 10.8 Thousand/uL    RED BLOOD CELL COUNT 2.46 (L) 4.20 - 5.80 Million/uL    HEMOGLOBIN 9.1 (L) 13.2 - 17.1 g/dL    HEMATOCRIT 27.3 (L) 38.5 - 50.0 %    .0 (H) 80.0 - 100.0 fL    MCH 37.0 (H) 27.0 - 33.0 pg    MCHC 33.3 32.0 - 36.0 g/dL     Comment: For adults, a slight decrease in the calculated MCHC  value (in the range of 30 to 32 g/dL) is most likely  not clinically significant; however, it should be  interpreted with caution in correlation with other  red cell parameters and the patient's clinical  condition.      RDW 13.5 11.0 - 15.0 %    PLATELET COUNT 209 140 - 400 Thousand/uL    MPV 10.9 7.5 - 12.5 fL    ABSOLUTE NEUTROPHILS 3,959 1,500 - 7,800 cells/uL    ABSOLUTE LYMPHOCYTES 1,345 850 - 3,900 cells/uL    ABSOLUTE MONOCYTES 413 200 - 950 cells/uL    ABSOLUTE EOSINOPHILS 171 15 - 500 cells/uL    ABSOLUTE BASOPHILS 12 0 - 200 cells/uL    NEUTROPHILS 67.1 %    LYMPHOCYTES 22.8 %     MONOCYTES 7.0 %    EOSINOPHILS 2.9 %    BASOPHILS 0.2 %     *Note: Due to a large number of results and/or encounters for the requested time period, some results have not been displayed. A complete set of results can be found in Results Review.             ASSESSMENT AND PLAN:     Mechanical Fall  Rib fractures  - Accidental fall 2 weeks ago. Respiratory status stalbe  - 50% recovered, will continue with tylenol as needed for mild pain.  - Norco, use judiciously. Aware to minimize use of Norco.  Only for severe episodes of pain  - For the rib fractures,, we will continue with conservative measures.  Continue with breathing exercises as instructed    Aortic insufficiency   -Previously followed with cardiology, Dr. Jaimes  - Plan to repeat echocardiogram    Emphysema  - Seems to be stable, continue with albuterol every 4-6 hours as needed  - Feels like it is progressing. Was on trelegy. Will resend  - There is an interaction with antiviral medication and Trelegy with any inhaled corticosteroid.      Hypertension  -Blood pressure today at goal  - Check blood pressures at home  - Continue with home, clonidine 0.1 mg nightly, losartan 25 mg daily, amlodipine 5 mg daily    Hyperlipidemia  -Last lipid panel total cholesterol 244,   - Repeat fasting lipid panel  - Continue with atorvastatin 10 mg nightly    CKD stage IV  -Last BUN 83, creatinine 4.88, EGFR 13  - Follows with nephrology, Dr. Heck.  May need dialysis in the future  - Continued with Farxiga 10 mg daily    HIV disease  Initially diagnosed 1986  -On  lamivudine, Zodivudine Prezista  - Follows with infectious disease, Dr. Nichols    Anemia of chronic disease  - Hemoglobin seems to be stable 9.1  - Will have colonoscopy 12/9/2024 with Dr. Deluna  -No indication for VEDA per Dr. Heck    Elevated fasting glucose  -Most recent visit glucose of 86  - Will continue slight reduction in carbohydrates    History of gout  - Will check uric acid level  -  Continue allopurinol 100 mg daily,     Osteoarthritis  Low back pain  Localized to multiple joints, low back.  Suspect this is due to acute musculoskeletal back pain such as muscle strain/lumbar radiculopathy  - Comanagement with rib fractures as above.  -Would recommend initial trial of conservative therapy:    -Acetaminophen 500-650 mg every 4-6 hours as needed for pain relief  - Avoid NSAIDs to avoid any side effects-worsening kidney function  -For more severe pain, continue with Norco 5 every 6 hours as needed, try to minimize use to avoid any negative side effects.    Anxiety  - Seems to be controlled on sertraline 100 mg, 2 tablets daily to total 200 mg  - Trazodone 50 mg twice a day    Insomnia  - Discussed sleep hygiene,    Getting yourself into a rested state of mind one-on-one for bedtime  Avoid any food or liquid intake 1 hour before going to bed  Turn off all screens including your cell phone and the TV 30 minutes before bed  Try meditation or mindfulness when read at bedtime    - An alternative would be melatonin 5 mg nightly as needed, use this in 15 minutes of trying to fall asleep due to the short onset of action  - On trazodone 50 mg twice a day  - Will continue with temazepam 30 mg nightly as needed.  Try to minimize use overall insomnia.  Try to minimize use and overlap between Norco and temazepam.             Orders This Visit:  Orders Placed This Encounter   Procedures    CBC With Differential With Platelet    Basic Metabolic Panel (8)    Ferritin    Iron And Tibc    Lipid Panel    Uric Acid    Hemoglobin A1C       Meds This Visit:  Requested Prescriptions     Signed Prescriptions Disp Refills    traZODone 50 MG Oral Tab 60 tablet 11     Sig: Take 1 tablet (50 mg total) by mouth 2 (two) times daily.    HYDROcodone-acetaminophen (NORCO) 5-325 MG Oral Tab 20 tablet 0     Sig: Take 1 tablet by mouth every 6 (six) hours as needed for Pain.    fluticasone-umeclidin-vilant (TRELEGY ELLIPTA)  200-62.5-25 MCG/ACT Inhalation Aerosol Powder, Breath Activated 1 each 3     Sig: Inhale 1 puff into the lungs daily.       Imaging & Referrals:  XR RIBS, UNILATERAL (2 VIEWS), LEFT (CPT=71100)     Health Maintenance  due for tetanus shot, shingles vaccine series     Spent 30 minutes obtaining history, evaluating patient, discussing treatment options, diet, exercise, review of available labs and radiology reports, and completing documentation.       Return to clinic in 3-4 months for follow-up    Sterling Rooney MD, 11/04/24, 8:38 AM           [1] No Known Allergies

## 2024-11-02 NOTE — PATIENT INSTRUCTIONS
You are seen in clinic today for follow-up.  We are happy to hear that you are recovering from your accidental fall with rib fractures  - Continue with Tylenol for mild pains  - Minimize use of Norco only for severe episodes of pain.  Monitor for constipation.  - Continue with breathing exercises  - Please complete an x-ray of the ribs in about 2-3 weeks to ensure that these have healed in the interval.    Today, we did review your most recent set of blood work which showed slight elevation in kidney function.   - Follow-up with Dr. Heck as we may be close to starting dialysis  - Continue with usual checks with surveillance blood tests    Lets plan to repeat the blood test prior to the next visit as the cholesterol levels were elevated as well.  Please make an appointment at [facility]    Periodically check your pressures at home, notify us if increasing    Lets continue monitoring arthritis and rib pains as best we can  -Would recommend initial trial of conservative therapy:    -Acetaminophen 500-650 mg every 4-6 hours as needed for pain relief  - Avoid NSAIDs to avoid any side effects-worsening kidney function  -For more severe pain, continue with Norco 5 every 6 hours as needed, try to minimize use to avoid any negative side effects.    Please continue managing with sleep:  Getting yourself into a rested state of mind one-on-one for bedtime  Avoid any food or liquid intake 1 hour before going to bed  Turn off all screens including your cell phone and the TV 30 minutes before bed  Try meditation or mindfulness when read at bedtime    - An alternative would be melatonin 5 mg nightly as needed, use this in 15 minutes of trying to fall asleep due to the short onset of action  - On trazodone 50 mg twice a day  - Will continue with temazepam 30 mg nightly as needed.  Try to minimize use overall insomnia.  Try to minimize use and overlap between Norco and temazepam.    We will follow-up with results of your  colonoscopy    Return to clinic in 3-4 months for follow-up.  We did place blood test to be completed prior to the next visit

## 2024-11-04 ENCOUNTER — OFFICE VISIT (OUTPATIENT)
Dept: INTERNAL MEDICINE CLINIC | Facility: CLINIC | Age: 64
End: 2024-11-04

## 2024-11-04 VITALS
BODY MASS INDEX: 21.6 KG/M2 | OXYGEN SATURATION: 98 % | HEIGHT: 68 IN | DIASTOLIC BLOOD PRESSURE: 70 MMHG | WEIGHT: 142.5 LBS | SYSTOLIC BLOOD PRESSURE: 126 MMHG | HEART RATE: 70 BPM

## 2024-11-04 DIAGNOSIS — I10 ESSENTIAL HYPERTENSION: ICD-10-CM

## 2024-11-04 DIAGNOSIS — D63.1 ANEMIA DUE TO STAGE 4 CHRONIC KIDNEY DISEASE (HCC): ICD-10-CM

## 2024-11-04 DIAGNOSIS — R73.01 ABNORMAL FASTING GLUCOSE: ICD-10-CM

## 2024-11-04 DIAGNOSIS — B20 HUMAN IMMUNODEFICIENCY VIRUS (HIV) DISEASE (HCC): ICD-10-CM

## 2024-11-04 DIAGNOSIS — E78.2 MIXED HYPERLIPIDEMIA: ICD-10-CM

## 2024-11-04 DIAGNOSIS — J43.9 PULMONARY EMPHYSEMA, UNSPECIFIED EMPHYSEMA TYPE (HCC): ICD-10-CM

## 2024-11-04 DIAGNOSIS — I35.1 AORTIC VALVE INSUFFICIENCY, ETIOLOGY OF CARDIAC VALVE DISEASE UNSPECIFIED: ICD-10-CM

## 2024-11-04 DIAGNOSIS — N18.4 ANEMIA DUE TO STAGE 4 CHRONIC KIDNEY DISEASE (HCC): ICD-10-CM

## 2024-11-04 DIAGNOSIS — Z87.39 HISTORY OF GOUT: ICD-10-CM

## 2024-11-04 DIAGNOSIS — M54.9 BACK PAIN, UNSPECIFIED BACK LOCATION, UNSPECIFIED BACK PAIN LATERALITY, UNSPECIFIED CHRONICITY: ICD-10-CM

## 2024-11-04 DIAGNOSIS — N18.4 STAGE 4 CHRONIC KIDNEY DISEASE (HCC): Primary | ICD-10-CM

## 2024-11-04 DIAGNOSIS — E53.8 FOLIC ACID DEFICIENCY: ICD-10-CM

## 2024-11-04 DIAGNOSIS — E78.00 HYPERCHOLESTEROLEMIA: ICD-10-CM

## 2024-11-04 DIAGNOSIS — S22.42XS CLOSED FRACTURE OF MULTIPLE RIBS OF LEFT SIDE, SEQUELA: ICD-10-CM

## 2024-11-04 DIAGNOSIS — G47.00 INSOMNIA, UNSPECIFIED TYPE: ICD-10-CM

## 2024-11-04 PROCEDURE — 99214 OFFICE O/P EST MOD 30 MIN: CPT | Performed by: INTERNAL MEDICINE

## 2024-11-04 RX ORDER — FLUTICASONE FUROATE, UMECLIDINIUM BROMIDE AND VILANTEROL TRIFENATATE 200; 62.5; 25 UG/1; UG/1; UG/1
1 POWDER RESPIRATORY (INHALATION) DAILY
Qty: 1 EACH | Refills: 3 | Status: SHIPPED | OUTPATIENT
Start: 2024-11-04

## 2024-11-04 RX ORDER — HYDROCODONE BITARTRATE AND ACETAMINOPHEN 5; 325 MG/1; MG/1
1 TABLET ORAL EVERY 6 HOURS PRN
Qty: 20 TABLET | Refills: 0 | Status: SHIPPED | OUTPATIENT
Start: 2024-11-04

## 2024-11-04 RX ORDER — TRAZODONE HYDROCHLORIDE 50 MG/1
50 TABLET, FILM COATED ORAL 2 TIMES DAILY
Qty: 60 TABLET | Refills: 11 | Status: SHIPPED | OUTPATIENT
Start: 2024-11-04

## 2024-11-05 ENCOUNTER — TELEPHONE (OUTPATIENT)
Dept: INTERNAL MEDICINE CLINIC | Facility: CLINIC | Age: 64
End: 2024-11-05

## 2024-11-05 NOTE — TELEPHONE ENCOUNTER
Prior Authorization needed for     Trelegy Ellipta 200-62.5MCG INH 30P    Plan does not cover this medication  Please call plan at [phone]  Patient ID #[id number]    Fax placed in purple folder

## 2024-11-05 NOTE — TELEPHONE ENCOUNTER
ePa submitted for Trelegy Ellipta    Waiting for Payer Response   11/5/2024  9:44 AM  Deadline to reply: November 10, 2024  9:34 AM Sending user: Liz Chamberlain RN   Note from payer: Prescriber details have been updated to match the prescriber directory.   Payer: [facility] Case ID: [id number]    [phone]  Attachment:  Document: ePA Attachment fluticasone-umeclidin-vilant (TRELEGY ELLIPTA) 200-62.5-25 MCG/ACT Inhalation Aerosol Powder, Breath Activated 11/5/2024- 9:41 AM  Medicaid [facility] Prescriber Default (9.30.24)   Not Available

## 2024-11-08 RX ORDER — BUDESONIDE AND FORMOTEROL FUMARATE DIHYDRATE 80; 4.5 UG/1; UG/1
2 AEROSOL RESPIRATORY (INHALATION) 2 TIMES DAILY
Qty: 3 EACH | Refills: 3 | Status: CANCELLED | OUTPATIENT
Start: 2024-11-08

## 2024-11-08 RX ORDER — TIOTROPIUM BROMIDE 18 UG/1
18 CAPSULE ORAL; RESPIRATORY (INHALATION) DAILY
Qty: 90 CAPSULE | Refills: 3 | Status: SHIPPED | OUTPATIENT
Start: 2024-11-08 | End: 2025-11-03

## 2024-11-08 NOTE — TELEPHONE ENCOUNTER
To Dr.P - Trelegy not covered by insurance (denied PA);  plan choice is spiriva and Symbicort;  Rxs pended if you want to change

## 2024-11-08 NOTE — TELEPHONE ENCOUNTER
Thank so much Casandra, we will proceed with Gordon Tropium.  He reported negative side effects for the inhaled corticosteroids.

## 2024-11-19 ENCOUNTER — TELEPHONE (OUTPATIENT)
Dept: INTERNAL MEDICINE CLINIC | Facility: CLINIC | Age: 64
End: 2024-11-19

## 2024-11-19 DIAGNOSIS — N18.4 STAGE 4 CHRONIC KIDNEY DISEASE (HCC): Primary | ICD-10-CM

## 2024-11-19 LAB
% SATURATION: 19 % (CALC) (ref 20–48)
ABSOLUTE BASOPHILS: 12 CELLS/UL (ref 0–200)
ABSOLUTE EOSINOPHILS: 238 CELLS/UL (ref 15–500)
ABSOLUTE LYMPHOCYTES: 1276 CELLS/UL (ref 850–3900)
ABSOLUTE MONOCYTES: 406 CELLS/UL (ref 200–950)
ABSOLUTE NEUTROPHILS: 3869 CELLS/UL (ref 1500–7800)
BASOPHILS: 0.2 %
BUN/CREATININE RATIO: 16 (CALC) (ref 6–22)
BUN: 81 MG/DL (ref 7–25)
CALCIUM: 8.8 MG/DL (ref 8.6–10.3)
CARBON DIOXIDE: 17 MMOL/L (ref 20–32)
CHLORIDE: 116 MMOL/L (ref 98–110)
CHOL/HDLC RATIO: 3.6 (CALC)
CHOLESTEROL, TOTAL: 209 MG/DL
CREATININE: 5.17 MG/DL (ref 0.7–1.35)
EGFR: 12 ML/MIN/1.73M2
EOSINOPHILS: 4.1 %
FERRITIN: 40 NG/ML (ref 24–380)
GLUCOSE: 97 MG/DL (ref 65–99)
HDL CHOLESTEROL: 58 MG/DL
HEMATOCRIT: 28.5 % (ref 38.5–50)
HEMOGLOBIN A1C: 5.3 % OF TOTAL HGB
HEMOGLOBIN: 9.3 G/DL (ref 13.2–17.1)
IRON BINDING CAPACITY: 298 MCG/DL (CALC) (ref 250–425)
IRON, TOTAL: 58 MCG/DL (ref 50–180)
LDL-CHOLESTEROL: 126 MG/DL (CALC)
LYMPHOCYTES: 22 %
MCH: 36.5 PG (ref 27–33)
MCHC: 32.6 G/DL (ref 32–36)
MCV: 111.8 FL (ref 80–100)
MONOCYTES: 7 %
MPV: 10.9 FL (ref 7.5–12.5)
NEUTROPHILS: 66.7 %
NON-HDL CHOLESTEROL: 151 MG/DL (CALC)
PLATELET COUNT: 179 THOUSAND/UL (ref 140–400)
POTASSIUM: 4.7 MMOL/L (ref 3.5–5.3)
RDW: 14.2 % (ref 11–15)
RED BLOOD CELL COUNT: 2.55 MILLION/UL (ref 4.2–5.8)
SODIUM: 142 MMOL/L (ref 135–146)
TRIGLYCERIDES: 142 MG/DL
URIC ACID: 4.6 MG/DL (ref 4–8)
WHITE BLOOD CELL COUNT: 5.8 THOUSAND/UL (ref 3.8–10.8)

## 2024-11-19 NOTE — TELEPHONE ENCOUNTER
Please notify patient reviewed the blood work from 11/18    The kidney function slightly increased on the last check.  Creatinine went from 4.88-5.17.  However these were fasting blood test, so he may have been slightly dehydrated    I would like him to repeat another kidney function lab within the next week nonfasting and staying well-hydrated.    Blood counts are holding steady, anemia is still about the same with hemoglobin 9.3    He has done a good job of bringing down the cholesterol numbers from October.  Lets continue with moderating nutrition

## 2024-12-09 ENCOUNTER — ANESTHESIA EVENT (OUTPATIENT)
Dept: ENDOSCOPY | Facility: HOSPITAL | Age: 64
End: 2024-12-09
Payer: MEDICAID

## 2024-12-09 ENCOUNTER — TELEPHONE (OUTPATIENT)
Dept: INTERNAL MEDICINE CLINIC | Facility: CLINIC | Age: 64
End: 2024-12-09

## 2024-12-09 ENCOUNTER — ANESTHESIA (OUTPATIENT)
Dept: ENDOSCOPY | Facility: HOSPITAL | Age: 64
End: 2024-12-09
Payer: MEDICAID

## 2024-12-09 ENCOUNTER — HOSPITAL ENCOUNTER (OUTPATIENT)
Facility: HOSPITAL | Age: 64
Setting detail: HOSPITAL OUTPATIENT SURGERY
Discharge: HOME | End: 2024-12-09
Attending: INTERNAL MEDICINE | Admitting: INTERNAL MEDICINE
Payer: MEDICAID

## 2024-12-09 VITALS
OXYGEN SATURATION: 100 % | HEIGHT: 68 IN | WEIGHT: 140 LBS | BODY MASS INDEX: 21.22 KG/M2 | TEMPERATURE: 97 F | HEART RATE: 67 BPM | SYSTOLIC BLOOD PRESSURE: 148 MMHG | DIASTOLIC BLOOD PRESSURE: 78 MMHG | RESPIRATION RATE: 12 BRPM

## 2024-12-09 DIAGNOSIS — R19.5 POSITIVE COLORECTAL CANCER SCREENING USING COLOGUARD TEST: ICD-10-CM

## 2024-12-09 DIAGNOSIS — F41.9 ANXIETY: ICD-10-CM

## 2024-12-09 DIAGNOSIS — G47.00 INSOMNIA, UNSPECIFIED TYPE: ICD-10-CM

## 2024-12-09 DIAGNOSIS — D50.9 IRON DEFICIENCY ANEMIA, UNSPECIFIED IRON DEFICIENCY ANEMIA TYPE: ICD-10-CM

## 2024-12-09 PROCEDURE — 45385 COLONOSCOPY W/LESION REMOVAL: CPT | Performed by: INTERNAL MEDICINE

## 2024-12-09 PROCEDURE — 0DBM8ZX EXCISION OF DESCENDING COLON, VIA NATURAL OR ARTIFICIAL OPENING ENDOSCOPIC, DIAGNOSTIC: ICD-10-PCS | Performed by: INTERNAL MEDICINE

## 2024-12-09 RX ORDER — NALOXONE HYDROCHLORIDE 0.4 MG/ML
0.08 INJECTION, SOLUTION INTRAMUSCULAR; INTRAVENOUS; SUBCUTANEOUS ONCE AS NEEDED
Status: DISCONTINUED | OUTPATIENT
Start: 2024-12-09 | End: 2024-12-09

## 2024-12-09 RX ORDER — LIDOCAINE HYDROCHLORIDE 10 MG/ML
INJECTION, SOLUTION EPIDURAL; INFILTRATION; INTRACAUDAL; PERINEURAL AS NEEDED
Status: DISCONTINUED | OUTPATIENT
Start: 2024-12-09 | End: 2024-12-09 | Stop reason: SURG

## 2024-12-09 RX ORDER — SODIUM CHLORIDE, SODIUM LACTATE, POTASSIUM CHLORIDE, CALCIUM CHLORIDE 600; 310; 30; 20 MG/100ML; MG/100ML; MG/100ML; MG/100ML
INJECTION, SOLUTION INTRAVENOUS CONTINUOUS
Status: DISCONTINUED | OUTPATIENT
Start: 2024-12-09 | End: 2024-12-09

## 2024-12-09 RX ADMIN — SODIUM CHLORIDE, SODIUM LACTATE, POTASSIUM CHLORIDE, CALCIUM CHLORIDE: 600; 310; 30; 20 INJECTION, SOLUTION INTRAVENOUS at 13:46:00

## 2024-12-09 RX ADMIN — SODIUM CHLORIDE, SODIUM LACTATE, POTASSIUM CHLORIDE, CALCIUM CHLORIDE: 600; 310; 30; 20 INJECTION, SOLUTION INTRAVENOUS at 14:20:00

## 2024-12-09 RX ADMIN — LIDOCAINE HYDROCHLORIDE 50 MG: 10 INJECTION, SOLUTION EPIDURAL; INFILTRATION; INTRACAUDAL; PERINEURAL at 13:49:00

## 2024-12-09 NOTE — OPERATIVE REPORT
[facility]    COLONOSCOPY PROCEDURE REPORT     DATE OF PROCEDURE:  12/9/2024     PCP: Sterling Rooney MD     PREOPERATIVE DIAGNOSIS:  Positive Cologuard stool screening test, anemia     POSTOPERATIVE DIAGNOSIS:  See impression.     SURGEON:  Jim Deluna M.D.     SEDATION:    MAC anesthesia provided by the Anesthesia Service.  MAC anesthesia requested due to anticipated intolerance of colonoscopy examination under safe doses conscious sedation medications       COLONOSCOPY PROCEDURE:   After the nature and risks of colonoscopy examination under MAC anesthesia were discussed with the patient and all questions answered, informed consent was obtained.  The patient was sedated as above.      Digital rectal exam was performed which showed no masses.  The Olympus pediatric video colonoscope was placed in the patient's rectum and advanced under direct visualization through the entire length of the colon up to the cecum and a glimpse of terminal ileum.  Retroflex exam performed up the ascending colon to the hepatic flexure.  The cecum was confirmed by landmarks including appendiceal orifice, cecal trifold, ileocecal valve.  Retroflexion was performed in the rectum.    The quality of the prep was good; some solid stool matter down in the sigmoid and rectum.    Estimated blood loss: none/insignificant       COLONOSCOPY FINDINGS:    Pedunculated 12-15 mm colon polyp removed from the descending colon region, about 55 cm from the anal verge by snare cautery polypectomy, cutting through the stalk fairly low close to the wall of the colon.  Polyp was retrieved intact in 1 piece.  Moderate severity pancolonic diverticulosis, most severe in the sigmoid colon.   Medium sized internal hemorrhoids.    RECOMMENDATIONS:  High fiber diet.  Follow-up above colon polyp pathology results.  Repeat colonoscopy examination in 5 years.  No aspirin or NSAID medications for next 10 days to prevent bleeding

## 2024-12-09 NOTE — DISCHARGE INSTRUCTIONS
.  .  .  Notes from Dr Deluna:  Medium severity \"diverticulosis\" (pockets) of the wall of the colon. You should be provided a handout regarding diet and the possible risk of diverticulitis/infection  NO CANCER!!!!!  One large benign colon polyp was found and removed today - to be sent to the lab to be examined - a letter will be sent with results.  You may see small quantities of blood with your next 1-2 bowel movements today.    If you check your results on [employer], the \"pathology\" report on the colon polyp(s) should be released within the next 24-48 hours.  In general, a \"hyperplastic\" colon polyp is completely benign, vs an \"adenoma\" or \"sessile serrated adenoma\" which is considered a benign but precancerous colon polyp.  That will be explained in a [employer] message from me as well.  No aspirin, Excedrin, Advil/Motrin/ibuprofen, Aleve/naproxen for next 10 days (to prevent bleeding.)  Internal hemorrhoids - very common  If you are raw and irritated back there after all of that diarrhea and wiping, three good options to soothe and heal the irritation include Aquaphor ointment, \"Desitin\" or \"A & D\" diaper ointment, or good old-fashioned Vaseline.  All of these soothe and create a protective barrier so that your skin can heal.  I recommend repeat colonoscopy exam in 5 years.  .  .  .      Home Care Instructions for Colonoscopy with Sedation    Diet:  - Resume your regular diet as tolerated unless otherwise instructed.  - Start with light meals to minimize bloating.  - Do not drink alcohol today.    Medication:  - If you have questions about resuming your normal medications, please contact your Primary Care Physician.    Activities:  - Take it easy today. Do not return to work today.  - Do not drive today.  - Do not operate any machinery today (including kitchen equipment).  -   Do not make any critical decisions or sign any paperwork.  - Do not exercise today.    Colonoscopy:  - You may notice some rectal \"spotting\"  (a little blood on the toilet tissue) for a day or two after the exam. This is normal.  - If you experience any rectal bleeding (not spotting), persistent tenderness or sharp severe abdominal pains, oral temperature over 100 degrees Fahrenheit, light-headedness or dizziness, or any other problems, contact your doctor.      **If unable to reach your doctor, please go to the [facility] Emergency Room**    - Your referring physician will receive a full report of your examination.  - If you do not hear from your doctor's office within two weeks of your biopsy, please call them for your results.    You may be able to see your laboratory results in [email or web address] between 4 and 7 business days.  In some cases, your physician may not have viewed the results before they are released to [email or web address].  If you have questions regarding your results contact the physician who ordered the test/exam by phone or via [email or web address] by choosing \"Ask a Medical Question.\"

## 2024-12-09 NOTE — ANESTHESIA PREPROCEDURE EVALUATION
Anesthesia PreOp Note    HPI:     Tru Montero is a 63 year old male who presents for preoperative consultation requested by: Jim Deluna MD    Date of Surgery: 12/9/2024    Procedure(s):  COLONOSCOPY  Indication: Iron deficiency anemia, unspecified iron deficiency anemia type/ Positive colorectal cancer screening using Cologuard test    Relevant Problems   No relevant active problems       NPO:  Last Liquid Consumption Date: 12/09/24  Last Liquid Consumption Time: 1145 (\"Big swig of water\" pt says about 8 oz)  Last Solid Consumption Date: 12/08/24  Last Solid Consumption Time: 1800  Last Liquid Consumption Date: 12/09/24          History Review:  Patient Active Problem List    Diagnosis Date Noted    Osteonecrosis of right hip (HCC) 10/22/2021    Osteonecrosis of left hip (HCC) 06/18/2021    GUSTAVO (obstructive sleep apnea) 06/18/2021    Anemia due to stage 4 chronic kidney disease (HCC) 04/12/2021    Nasal septal deviation 03/10/2021    Dyspnea     Fatigue 02/24/2020    Primary osteoarthritis involving multiple joints 06/21/2019    Primary osteoarthritis of right knee 06/21/2019    Folic acid deficiency 06/21/2019    Cough due to ACE inhibitor 04/04/2019    Abnormal EKG 08/03/2018    Hypertriglyceridemia 08/03/2018    Anemia in stage 3 chronic kidney disease (HCC) 08/03/2018    Pulmonary emphysema (HCC) 06/06/2018    Chronic pain of left knee 06/06/2018    Essential hypertension 06/06/2018    Hypercholesterolemia 06/06/2018    Aortic valve regurgitation 03/09/2016    Dislocation of left shoulder joint 04/29/2015    Aortic valve disorder 02/17/2012    Other and unspecified angina pectoris 02/17/2012    Mixed hyperlipidemia 02/15/2012    Stage 4 chronic kidney disease (HCC) 10/02/2009    HIV (human immunodeficiency virus infection) ([facility]) 10/02/2009       Past Medical History:    Anxiety state    Asthma ([facility])    Chronic kidney disease, stage 3 (HCC)    COPD (chronic obstructive pulmonary disease) ([facility])     Depression    Essential hypertension    High blood pressure    High cholesterol    HIV disease (HCC)    Hyperlipidemia    Osteoarthritis    Renal disorder    Sleep apnea    no CPAP use    Visual impairment    Reading glasses       Past Surgical History:   Procedure Laterality Date    Colonoscopy      Colonoscopy      Colonoscopy  2024    Hernia surgery      bilateral inguinal     Hip replacement surgery Right 10/22/2021    Sinus surgery    03/10/2021    Deviated septum repair    Tonsillectomy      Total hip replacement Left 2021    Valve repair  2001       Prescriptions Prior to Admission[1]  Current Medications and Prescriptions Ordered in Epic[2]    Allergies[3]    Family History   Problem Relation Age of Onset    Cancer Father         lung    Cancer Mother         lung    Heart Attack Mother     Cancer Maternal Grandmother         pancreatic    Heart Attack Maternal Grandfather     Stroke Sister     No Known Problems Brother     No Known Problems Brother      Social History     Socioeconomic History    Marital status: Life Partner   Tobacco Use    Smoking status: Former     Current packs/day: 0.00     Average packs/day: 0.5 packs/day for 20.0 years (10.0 ttl pk-yrs)     Types: Cigarettes     Start date: 1991     Quit date: 2011     Years since quittin.8    Smokeless tobacco: Never   Vaping Use    Vaping status: Never Used   Substance and Sexual Activity    Alcohol use: Yes     Alcohol/week: 2.0 standard drinks of alcohol     Types: 2 Glasses of wine per week     Comment: once a week, occ    Drug use: Yes     Frequency: 3.0 times per week     Types: Cannabis     Comment: daily       Available pre-op labs reviewed.  Lab Results   Component Value Date    WBC 5.8 2024    RBC 2.55 (L) 2024    HGB 9.3 (L) 2024    HCT 28.5 (L) 2024    .8 (H) 2024    MCH 36.5 (H) 2024    MCHC 32.6 2024    RDW 14.2 2024     2024     Lab  Results   Component Value Date     11/18/2024    K 4.7 11/18/2024     (H) 11/18/2024    CO2 17 (L) 11/18/2024    BUN 81 (H) 11/18/2024    CREATSERUM 5.17 (H) 11/18/2024    GLU 97 11/18/2024    CA 8.8 11/18/2024          Vital Signs:  Body mass index is 21.29 kg/m².   height is 1.727 m (5' 8\") and weight is 63.5 kg (140 lb). His blood pressure is 147/87 and his pulse is 66. His respiration is 17 and oxygen saturation is 100%.   Vitals:    12/03/24 1107 12/09/24 1236   BP:  147/87   Pulse:  66   Resp:  17   SpO2:  100%   Weight: 63.5 kg (140 lb) 63.5 kg (140 lb)   Height: 1.727 m (5' 8\") 1.727 m (5' 8\")        Anesthesia Evaluation     Patient summary reviewed and Nursing notes reviewed    Airway   Mallampati: II  TM distance: >3 FB  Neck ROM: full  Dental - Dentition appears grossly intact     Pulmonary - normal exam   (+) COPD mild, sleep apnea  Cardiovascular - normal exam  (+) hypertension well controlled    Neuro/Psych    (+)  anxiety/panic attacks,  depression      GI/Hepatic/Renal - negative ROS   (+) chronic renal disease ESRD    Endo/Other - negative ROS   Abdominal  - normal exam                 Anesthesia Plan:   ASA:  3  Plan:   MAC  Informed Consent Plan and Risks Discussed With:  Patient  Discussed plan with:  Surgeon      I have informed Tru Montero and/or legal guardian or family member of the nature of the anesthetic plan, benefits, risks including possible dental damage if relevant, major complications, and any alternative forms of anesthetic management.   All of the patient's questions were answered to the best of my ability. The patient desires the anesthetic management as planned.  LORRIE SUAREZ CRNA  12/9/2024 1:29 PM  Present on Admission:  **None**           [1]   Medications Prior to Admission   Medication Sig Dispense Refill Last Dose/Taking    tiotropium (SPIRIVA HANDIHALER) 18 MCG Inhalation Cap Inhale 1 capsule (18 mcg total) into the lungs daily. 90 capsule 3 12/8/2024     traZODone 50 MG Oral Tab Take 1 tablet (50 mg total) by mouth 2 (two) times daily. 60 tablet 11 12/9/2024 Morning    HYDROcodone-acetaminophen (NORCO) 5-325 MG Oral Tab Take 1 tablet by mouth every 6 (six) hours as needed for Pain. 20 tablet 0 Taking As Needed    sertraline 100 MG Oral Tab Take 2 tablets (200 mg total) by mouth daily. 180 tablet 3 12/8/2024    temazepam 30 MG Oral Cap Take 1 capsule (30 mg total) by mouth daily. 30 capsule 5 12/8/2024    Sildenafil Citrate 100 MG Oral Tab Take 1 tablet (100 mg total) by mouth as needed for Erectile Dysfunction (Patient is to take 1 tablet 1 hour prior to relations.). 6 tablet 11 Taking As Needed    albuterol 108 (90 Base) MCG/ACT Inhalation Aero Soln INHALE 1 PUFF INTO THE LUNGS EVERY 6 HOURS AS NEEDED FOR WHEEZING 1 each 11 12/8/2024    atorvastatin 10 MG Oral Tab TAKE 1 TABLET(10 MG) BY MOUTH EVERY NIGHT 90 tablet 3 12/8/2024    FOLIC ACID 1 MG Oral Tab TAKE 1 TABLET(1 MG) BY MOUTH DAILY 90 tablet 3 12/8/2024    Calcium Citrate-Vitamin D 315-250 MG-UNIT Oral Tab Take 1 tablet by mouth 2 (two) times daily with meals. 60 tablet 0 12/8/2024    Multiple Vitamins-Minerals (CEROVITE SENIOR) Oral Tab Take 1 tablet by mouth daily. 30 tablet 0 12/8/2024    amLODIPine Besylate 5 MG Oral Tab Take 1 tablet (5 mg total) by mouth daily.   12/8/2024    Losartan Potassium 25 MG Oral Tab Take 1 tablet (25 mg total) by mouth daily.  6 12/8/2024    Cholecalciferol 5000 units Oral Tab Take 1 tablet (5,000 Units total) by mouth daily.   12/8/2024    CloNIDine HCl 0.1 MG Oral Tab Take 1 tablet (0.1 mg total) by mouth daily. Pt takes a night  2 12/8/2024    allopurinol 100 MG Oral Tab Take 1 tablet (100 mg total) by mouth daily.  3 12/8/2024    PREZISTA 600 MG Oral Tab Take 1 tablet (600 mg total) by mouth 2 (two) times daily.  0 12/8/2024    zidovudine 300 MG Oral Tab Take 1 tablet (300 mg total) by mouth 2 (two) times daily.  6 12/8/2024    lamiVUDine 150 MG Oral Tab Take 1 tablet  (150 mg total) by mouth 2 (two) times daily.   2024    HYDROcodone-acetaminophen 5-325 MG Oral Tab Take 1 tablet by mouth every 6 (six) hours as needed for Pain. 30 tablet 0     Na Sulfate-K Sulfate-Mg Sulf (SUPREP BOWEL PREP KIT) 17.5-3.13-1.6 GM/177ML Oral Solution Take as directed 1 each 0     [] PEG 3350-KCl-NaBcb-NaCl-NaSulf (PEG-3350/ELECTROLYTES) 236 g Oral Recon Soln Take 4 L by mouth one time for 1 dose. 1 each 0     FARXIGA 10 MG Oral Tab Take 1 tablet (10 mg total) by mouth daily.   2024   [2]   Current Facility-Administered Medications Ordered in Epic   Medication Dose Route Frequency Provider Last Rate Last Admin    lactated ringers infusion   Intravenous Continuous Jim Deluna MD         No current [facility]-ordered outpatient medications on file.   [3] No Known Allergies

## 2024-12-09 NOTE — ANESTHESIA POSTPROCEDURE EVALUATION
Patient: Tru Montero    Procedure Summary       Date: 12/09/24 Room / Location: [facility] ENDOSCOPY 05 / [facility] ENDOSCOPY    Anesthesia Start: 1346 Anesthesia Stop:     Procedure: COLONOSCOPY Diagnosis:       Iron deficiency anemia, unspecified iron deficiency anemia type      Positive colorectal cancer screening using Cologuard test      (diverticulosis, polyp, hemorrhoids)    Surgeons: Jim Deluna MD Anesthesiologist: Kiya Suarez CRNA    Anesthesia Type: MAC ASA Status: 3            Anesthesia Type: MAC    Vitals Value Taken Time   /79 12/09/24 1426   Temp 97 °F (36.1 °C) 12/09/24 1426   Pulse 80 12/09/24 1426   Resp 17 12/09/24 1426   SpO2 98 % 12/09/24 1426   Vitals shown include unfiled device data.    [facility] AN Post Evaluation:   Patient Evaluated in PACU  Patient Participation: complete - patient participated  Level of Consciousness: sleepy but conscious  Pain Score: 0  Pain Management: adequate  Airway Patency:patent  Dental exam unchanged from preop  Yes    Cardiovascular Status: stable and acceptable  Respiratory Status: acceptable and room air  Postoperative Hydration acceptable      KIYA SUAREZ CRNA  12/9/2024 2:27 PM

## 2024-12-09 NOTE — H&P
History & Physical Examination    Patient Name: Tru Montero  MRN: [id number]  CSN: [id number]  YOB: 1960    Diagnosis: Positive Cologuard stool screening test, anemia    PRESENT ILLNESS: Fit and healthy gentleman with history of HIV well-controlled who presents for anoscopy examination for screening, evaluation of positive Cologuard test.      BMI Readings from Last 1 Encounters:   24 21.29 kg/m²         Prescriptions Prior to Admission[1]  Current Facility-Administered Medications   Medication Dose Route Frequency    lactated ringers infusion   Intravenous Continuous       Allergies: Allergies[2]    Past Medical History:    Anxiety state    Asthma (HCC)    Chronic kidney disease, stage 3 (HCC)    COPD (chronic obstructive pulmonary disease) (HCC)    Depression    Essential hypertension    High blood pressure    High cholesterol    HIV disease (HCC)    Hyperlipidemia    Osteoarthritis    Renal disorder    Sleep apnea    no CPAP use    Visual impairment    Reading glasses     Past Surgical History:   Procedure Laterality Date    Colonoscopy      Colonoscopy      Hernia surgery      bilateral inguinal     Hip replacement surgery Right 10/22/2021    Sinus surgery    03/10/2021    Deviated septum repair    Tonsillectomy      Total hip replacement Left 2021    Valve repair       Family History   Problem Relation Age of Onset    Cancer Father         lung    Cancer Mother         lung    Heart Attack Mother     Cancer Maternal Grandmother         pancreatic    Heart Attack Maternal Grandfather     Stroke Sister     No Known Problems Brother     No Known Problems Brother      Social History     Tobacco Use    Smoking status: Former     Current packs/day: 0.00     Average packs/day: 0.5 packs/day for 20.0 years (10.0 ttl pk-yrs)     Types: Cigarettes     Start date: 1991     Quit date: 2011     Years since quittin.8    Smokeless tobacco: Never   Substance Use Topics     Alcohol use: Yes     Alcohol/week: 2.0 standard drinks of alcohol     Types: 2 Glasses of wine per week     Comment: once a week, occ       SYSTEM Check if Review is Normal Check if Physical Exam is Normal If not normal, please explain:   HEENT [ ] [X ]    NECK & BACK [ ] [ ]    HEART [ X ] [ X ]    LUNGS [ X ] [ X ]    ABDOMEN [ X ] [ X ]    UROGENITAL [ ] [ ]    EXTREMITIES [ ] [ ]    OTHER        See Anesthesia documentation    [ x ] I have discussed the risks and benefits and alternatives with the patient/family.  They understand and agree to proceed with plan of care.  [ x ] I have reviewed the History and Physical done within the last 30 days.  Any changes noted above.    Jim Deluna MD  12/9/2024  12:57 PM             [1]   Medications Prior to Admission   Medication Sig Dispense Refill Last Dose/Taking    tiotropium (SPIRIVA HANDIHALER) 18 MCG Inhalation Cap Inhale 1 capsule (18 mcg total) into the lungs daily. 90 capsule 3 12/8/2024    traZODone 50 MG Oral Tab Take 1 tablet (50 mg total) by mouth 2 (two) times daily. 60 tablet 11 12/9/2024 Morning    HYDROcodone-acetaminophen (NORCO) 5-325 MG Oral Tab Take 1 tablet by mouth every 6 (six) hours as needed for Pain. 20 tablet 0 Taking As Needed    sertraline 100 MG Oral Tab Take 2 tablets (200 mg total) by mouth daily. 180 tablet 3 12/8/2024    temazepam 30 MG Oral Cap Take 1 capsule (30 mg total) by mouth daily. 30 capsule 5 12/8/2024    Sildenafil Citrate 100 MG Oral Tab Take 1 tablet (100 mg total) by mouth as needed for Erectile Dysfunction (Patient is to take 1 tablet 1 hour prior to relations.). 6 tablet 11 Taking As Needed    albuterol 108 (90 Base) MCG/ACT Inhalation Aero Soln INHALE 1 PUFF INTO THE LUNGS EVERY 6 HOURS AS NEEDED FOR WHEEZING 1 each 11 12/8/2024    atorvastatin 10 MG Oral Tab TAKE 1 TABLET(10 MG) BY MOUTH EVERY NIGHT 90 tablet 3 12/8/2024    FOLIC ACID 1 MG Oral Tab TAKE 1 TABLET(1 MG) BY MOUTH DAILY 90 tablet 3 12/8/2024     Calcium Citrate-Vitamin D 315-250 MG-UNIT Oral Tab Take 1 tablet by mouth 2 (two) times daily with meals. 60 tablet 0 2024    Multiple Vitamins-Minerals (CEROVITE SENIOR) Oral Tab Take 1 tablet by mouth daily. 30 tablet 0 2024    amLODIPine Besylate 5 MG Oral Tab Take 1 tablet (5 mg total) by mouth daily.   2024    Losartan Potassium 25 MG Oral Tab Take 1 tablet (25 mg total) by mouth daily.  6 2024    Cholecalciferol 5000 units Oral Tab Take 1 tablet (5,000 Units total) by mouth daily.   2024    CloNIDine HCl 0.1 MG Oral Tab Take 1 tablet (0.1 mg total) by mouth daily. Pt takes a night  2 2024    allopurinol 100 MG Oral Tab Take 1 tablet (100 mg total) by mouth daily.  3 2024    PREZISTA 600 MG Oral Tab Take 1 tablet (600 mg total) by mouth 2 (two) times daily.  0 2024    zidovudine 300 MG Oral Tab Take 1 tablet (300 mg total) by mouth 2 (two) times daily.  6 2024    lamiVUDine 150 MG Oral Tab Take 1 tablet (150 mg total) by mouth 2 (two) times daily.   2024    HYDROcodone-acetaminophen 5-325 MG Oral Tab Take 1 tablet by mouth every 6 (six) hours as needed for Pain. 30 tablet 0     Na Sulfate-K Sulfate-Mg Sulf (SUPREP BOWEL PREP KIT) 17.5-3.13-1.6 GM/177ML Oral Solution Take as directed 1 each 0     [] PEG 3350-KCl-NaBcb-NaCl-NaSulf (PEG-3350/ELECTROLYTES) 236 g Oral Recon Soln Take 4 L by mouth one time for 1 dose. 1 each 0     FARXIGA 10 MG Oral Tab Take 1 tablet (10 mg total) by mouth daily.   2024   [2] No Known Allergies

## 2024-12-09 NOTE — TELEPHONE ENCOUNTER
To MD:  The above refill request is for a controlled substance.  Please review pended medication order.   Print and sign for staff to fax to pharmacy or prescribe electronically.    Last office visit: 11/4/2024  Last time refill sent and quantity/refills: 6/12/2024 #30 with 5 refills ()

## 2024-12-10 RX ORDER — TEMAZEPAM 30 MG/1
30 CAPSULE ORAL DAILY
Qty: 30 CAPSULE | Refills: 5 | Status: SHIPPED | OUTPATIENT
Start: 2024-12-10

## 2024-12-30 ENCOUNTER — TELEPHONE (OUTPATIENT)
Facility: CLINIC | Age: 64
End: 2024-12-30

## 2024-12-30 NOTE — TELEPHONE ENCOUNTER
----- Message from Jim Deluna sent at 12/29/2024  3:10 PM CST -----  GI RNs - please recall for colonoscopy exam in 5yrs

## 2024-12-31 NOTE — TELEPHONE ENCOUNTER
Recall colon in 5 years per Dr Deluna. Colon done 12/09/2024    Health maintenance updated and message sent to pt outreach to repeat colonoscopy in 5 years

## 2025-03-14 RX ORDER — ALBUTEROL SULFATE 90 UG/1
1 INHALANT RESPIRATORY (INHALATION) EVERY 6 HOURS PRN
Qty: 1 EACH | Refills: 11 | Status: SHIPPED | OUTPATIENT
Start: 2025-03-14

## 2025-03-14 NOTE — TELEPHONE ENCOUNTER
Refill request is for a maintenance medication and has met the criteria specified in the Ambulatory Medication Refill Standing Order for eligibility, visits, laboratory, alerts and was sent to the requested pharmacy.    Requested Prescriptions     Signed Prescriptions Disp Refills    albuterol 108 (90 Base) MCG/ACT Inhalation Aero Soln 1 each 11     Sig: Inhale 1 puff into the lungs every 6 (six) hours as needed for Wheezing.     Authorizing Provider: CEDRIC SIMMONS     Ordering User: EDEN CHADWICK

## 2025-03-17 ENCOUNTER — TELEPHONE (OUTPATIENT)
Dept: INTERNAL MEDICINE CLINIC | Facility: CLINIC | Age: 65
End: 2025-03-17

## 2025-03-17 NOTE — TELEPHONE ENCOUNTER
Received faxed Prior Auth request for Albuterol from [employer].     Plan phone #[phone]  Patient ID# [id number]    Form placed in purple folder.

## 2025-03-19 RX ORDER — FOLIC ACID 1 MG/1
1 TABLET ORAL DAILY
Qty: 90 TABLET | Refills: 3 | Status: SHIPPED | OUTPATIENT
Start: 2025-03-19

## 2025-03-19 NOTE — TELEPHONE ENCOUNTER
To Dr. Rooney for initial RX(s)     Previously prescribed by patient's former PCP, Dr. Jesus.     Established with you on 11/4/2024.     Thank you!

## 2025-03-20 ENCOUNTER — TELEPHONE (OUTPATIENT)
Dept: INTERNAL MEDICINE CLINIC | Facility: CLINIC | Age: 65
End: 2025-03-20

## 2025-03-20 DIAGNOSIS — E78.2 MIXED HYPERLIPIDEMIA: ICD-10-CM

## 2025-03-20 DIAGNOSIS — Z13.0 SCREENING FOR DEFICIENCY ANEMIA: ICD-10-CM

## 2025-03-20 DIAGNOSIS — D63.1 ANEMIA DUE TO STAGE 4 CHRONIC KIDNEY DISEASE (HCC): ICD-10-CM

## 2025-03-20 DIAGNOSIS — N18.4 STAGE 4 CHRONIC KIDNEY DISEASE (HCC): ICD-10-CM

## 2025-03-20 DIAGNOSIS — E53.8 FOLIC ACID DEFICIENCY: ICD-10-CM

## 2025-03-20 DIAGNOSIS — Z00.00 ANNUAL PHYSICAL EXAM: Primary | ICD-10-CM

## 2025-03-20 DIAGNOSIS — Z87.39 HISTORY OF GOUT: ICD-10-CM

## 2025-03-20 DIAGNOSIS — N18.4 ANEMIA DUE TO STAGE 4 CHRONIC KIDNEY DISEASE (HCC): ICD-10-CM

## 2025-03-20 DIAGNOSIS — I10 ESSENTIAL HYPERTENSION: ICD-10-CM

## 2025-03-20 DIAGNOSIS — Z13.29 SCREENING FOR THYROID DISORDER: ICD-10-CM

## 2025-03-20 NOTE — TELEPHONE ENCOUNTER
Patient had labs done in October and November 2024. To  to please advise what labs patient needs at this time

## 2025-03-20 NOTE — TELEPHONE ENCOUNTER
Patient calling to request blood work prior to:    [] physical    [] check-up      [x] other    Patient uses:  [x] [facility] [] [facility]       [facility] location:       Fax #:    Patient prefers to be notified once labs are placed by: [x] phone call  [] my chart message    Patient requesting labs for 3 month follow up.  Has appointment with Dr. Rooney 3/31/2025.    Please call when orders have been placed.

## 2025-03-23 NOTE — PATIENT INSTRUCTIONS
You are seen in clinic today for follow-up.  We reviewed your most recent set of blood work with nephrology, Dr. Heck.    Blood counts remain stable.  Monitor for any bleeding episodes.  -Lets continue the same iron supplementation for now.  Around the time of the next visit, we will see how the iron levels look.  In some cases, iron IV infusions may be needed..    Today, we did review your most recent set of blood work which showed slight elevation in kidney function.   - Follow-up with Dr. Heck as we may be close to starting dialysis hopefully we can delay this far into the future as able.  - Continue with usual checks with surveillance blood tests    Lets plan to repeat the blood test prior to the next visit as the cholesterol levels were elevated as well.  Please make an appointment at [facility]    Periodically check your pressures at home, notify us if increasing    Lets continue monitoring arthritis and rib pains as best we can  -Would recommend initial trial of conservative therapy:    -Acetaminophen 500-650 mg every 4-6 hours as needed for pain relief  - Avoid NSAIDs to avoid any side effects-worsening kidney function  -For more severe pain, continue with Norco 5 every 6 hours as needed, try to minimize use to avoid any negative side effects.    Please continue managing with sleep:  Getting yourself into a rested state of mind one-on-one for bedtime  Avoid any food or liquid intake 1 hour before going to bed  Turn off all screens including your cell phone and the TV 30 minutes before bed  Try meditation or mindfulness when read at bedtime    - An alternative would be melatonin 5 mg nightly as needed, use this in 15 minutes of trying to fall asleep due to the short onset of action  - On trazodone 50 mg twice a day  - Will continue with temazepam 30 mg nightly as needed.  Try to minimize use overall insomnia.  Try to minimize use and overlap between Norco and temazepam.    Next colonoscopy will be due 5  years with Dr. Deluna, 2029    Return to clinic in 3-4 months for follow-up.  We did place blood test to be completed prior to the next visit

## 2025-03-23 NOTE — PROGRESS NOTES
Chief Complaint:   Chief Complaint   Patient presents with    Checkup     3 month       HPI:     Mr. MATTHEW is a 64 year old male PMHX hypertension, hyperlipidemia, CKD stage IV, aortic valve insufficiency, emphysema, history of HIV disease, osteoarthritis, insomnia, anxiety coming in for follow-up.    Had an illness. Has fully recovered. The cold can affect him as well. The back and ribs are feeling recovered. Pain is controlled. Sometimes with hip pain from arthritis. Hydrocodone only some days. He is trying to minmize the use of it.  Having regular bowel movements    SLeep has been good. Nocturia 4-5 times at its worst.  Still manageable however.  He is making good urine, no leg swelling.    Past Medical History:    Anxiety state    Asthma (HCC)    Chronic kidney disease, stage 3 (HCC)    COPD (chronic obstructive pulmonary disease) (HCC)    Depression    Essential hypertension    High blood pressure    High cholesterol    HIV disease (HCC)    Hyperlipidemia    Osteoarthritis    Renal disorder    Sleep apnea    no CPAP use    Visual impairment    Reading glasses     Past Surgical History:   Procedure Laterality Date    Colonoscopy      Colonoscopy      Colonoscopy N/A 2024    Procedure: COLONOSCOPY;  Surgeon: Jim Deluna MD;  Location: [facility] ENDOSCOPY    Colonoscopy & polypectomy  2024    Hernia surgery      bilateral inguinal     Hip replacement surgery Right 10/22/2021    Sinus surgery    03/10/2021    Deviated septum repair    Tonsillectomy      Total hip replacement Left 2021    Valve repair       Social History:  Social History     Socioeconomic History    Marital status: Life Partner   Tobacco Use    Smoking status: Former     Current packs/day: 0.00     Average packs/day: 0.5 packs/day for 20.0 years (10.0 ttl pk-yrs)     Types: Cigarettes     Start date: 1991     Quit date: 2011     Years since quittin.1    Smokeless tobacco: Never   Vaping Use    Vaping  status: Never Used   Substance and Sexual Activity    Alcohol use: Yes     Alcohol/week: 2.0 standard drinks of alcohol     Types: 2 Glasses of wine per week     Comment: once a week, occ    Drug use: Yes     Frequency: 3.0 times per week     Types: Cannabis     Comment: daily     Social Drivers of Health      [facility] [employer] [employer]    [facility]     Family History:  Family History   Problem Relation Age of Onset    Cancer Father         lung    Cancer Mother         lung    Heart Attack Mother     Cancer Maternal Grandmother         pancreatic    Heart Attack Maternal Grandfather     Stroke Sister     No Known Problems Brother     No Known Problems Brother      Allergies:  Allergies[1]  Current Meds:  Current Outpatient Medications   Medication Sig Dispense Refill    sodium bicarbonate 650 MG Oral Tab Take 1 tablet (650 mg total) by mouth 3 (three) times daily.      folic acid 1 MG Oral Tab Take 1 tablet (1 mg total) by mouth daily. 90 tablet 3    albuterol 108 (90 Base) MCG/ACT Inhalation Aero Soln Inhale 1 puff into the lungs every 6 (six) hours as needed for Wheezing. 1 each 11    temazepam 30 MG Oral Cap Take 1 capsule (30 mg total) by mouth daily. 30 capsule 5    tiotropium (SPIRIVA HANDIHALER) 18 MCG Inhalation Cap Inhale 1 capsule (18 mcg total) into the lungs daily. 90 capsule 3    traZODone 50 MG Oral Tab Take 1 tablet (50 mg total) by mouth 2 (two) times daily. 60 tablet 11    HYDROcodone-acetaminophen (NORCO) 5-325 MG Oral Tab Take 1 tablet by mouth every 6 (six) hours as needed for Pain. 20 tablet 0    HYDROcodone-acetaminophen 5-325 MG Oral Tab Take 1 tablet by mouth every 6 (six) hours as needed for Pain. 30 tablet 0    Na Sulfate-K Sulfate-Mg Sulf (SUPREP BOWEL PREP KIT) 17.5-3.13-1.6 GM/177ML Oral Solution Take as directed 1 each 0    sertraline 100 MG Oral Tab Take 2 tablets (200 mg total) by mouth daily. 180 tablet 3    Sildenafil Citrate 100 MG Oral Tab Take 1 tablet (100 mg  total) by mouth as needed for Erectile Dysfunction (Patient is to take 1 tablet 1 hour prior to relations.). 6 tablet 11    atorvastatin 10 MG Oral Tab TAKE 1 TABLET(10 MG) BY MOUTH EVERY NIGHT 90 tablet 3    FARXIGA 10 MG Oral Tab Take 1 tablet (10 mg total) by mouth daily.      Calcium Citrate-Vitamin D 315-250 MG-UNIT Oral Tab Take 1 tablet by mouth 2 (two) times daily with meals. 60 tablet 0    Multiple Vitamins-Minerals (CEROVITE SENIOR) Oral Tab Take 1 tablet by mouth daily. 30 tablet 0    amLODIPine Besylate 5 MG Oral Tab Take 1 tablet (5 mg total) by mouth daily.      Losartan Potassium 25 MG Oral Tab Take 1 tablet (25 mg total) by mouth daily.  6    Cholecalciferol 5000 units Oral Tab Take 1 tablet (5,000 Units total) by mouth daily.      CloNIDine HCl 0.1 MG Oral Tab Take 1 tablet (0.1 mg total) by mouth daily. Pt takes a night  2    allopurinol 100 MG Oral Tab Take 1 tablet (100 mg total) by mouth daily.  3    PREZISTA 600 MG Oral Tab Take 1 tablet (600 mg total) by mouth 2 (two) times daily.  0    zidovudine 300 MG Oral Tab Take 1 tablet (300 mg total) by mouth 2 (two) times daily.  6    lamiVUDine 150 MG Oral Tab Take 1 tablet (150 mg total) by mouth 2 (two) times daily.        Counseling given: Not Answered       REVIEW OF SYSTEMS:   Positive Findings indicated in BOLD    Constitutional: Fever, Chills, Weight Gain, Weight Loss, Night Sweats, Fatigue, Malaise  ENT/Mouth:  Hearing Changes, Ear Pain, Nasal Congestion, Sinus Pain, Hoarseness, Sore throat, Rhinorrhea, Swallowing Difficulty  Eyes: Eye Pain, Swelling, Redness, Foreign Body, Discharge, Vision Changes  Cardiovascular: Chest Pain, SOB, PND, Dyspnea on Exertion, Orthopnea, Claudication, Edema, Palpitations  Respiratory: Cough, Sputum, Wheezing, Shortness of breath, pleuritic chest pain  Gastrointestinal: Nausea, Vomiting, Diarrhea, Constipation, Pain, Heartburn, Dysphagia, Bloody stools, Tarry stools  Genitourinary: Dysmenorrhea, Dysuria, Urinary  Frequency, Hematuria, Urinary Incontinence, Urgency,  Flank Pain  Musculoskeletal: Arthralgias, Myalgias, Joint Swelling, Joint Stiffness, Back Pain, Neck Pain  Integumentary: Skin Lesions, Pruritis, Hair Changes, Jaundice, Nail changes  Neuro: Weakness, Numbness, Paresthesias, Loss of Consciousness, Syncope, Dizziness, Headache, Falls  Psych: Anxiety, Depression, Insomnia, Suicidal Ideation, Homicidal ideation, Memory Changes  Heme/Lymph: Bruising, Bleeding, Lymphadenopathy  Endocrine: Polyuria, Polydipsia, Temperature Intolerance    EXAM:   Vital Signs:  Blood pressure 146/64, pulse 64, temperature 97.8 °F (36.6 °C), height 5' 8\" (1.727 m), weight 137 lb 6.4 oz (62.3 kg), SpO2 100%.     Constitutional: No acute distress. Alert and oriented x 3.  Eyes: EOMI, PERRLA, clear sclera b/l  HENT: NCAT, Moist mucous membranes, Oropharynx without erythema or exudates  Neck: No JVD, no thyromegaly  Cardiovascular: S1, S2, no S3, no S4, Regular rate and rhythm, No murmurs/gallops/rubs.   Vascular: Equal pulses 2+ carotids no bruits or thrills/radial/DP/PT bilaterally  Respiratory: Clear to auscultation bilaterally.  No wheezes/rales/rhonchi  Gastrointestinal: Soft, nontender, nondistended. Positive bowel sounds x 4. No rebound tenderness. No hepatomegaly, No splenomegaly  Genitourinary: No CVA tenderness bilaterally  Neurologic: No focal neurological deficits, CN II-XII intact, light touch intact, MSK Strength 5/5 and symmetric in all extremities, normal gait, 2+ patellar tendon  Musculoskeletal: Full range of motion of all extremities, no clubbing/swelling/edema  Skin: No lesions, No erythema, no jaundice, Cap Refill < 2s  Psychiatric: Appropriate mood and affect  Heme/Lymph/Immune: No cervical LAD          DATA REVIEWED   Labs:  Recent Results (from the past 8760 hours)   Basic Metabolic Panel (8)    Collection Time: 11/18/24  1:22 PM   Result Value Ref Range    GLUCOSE 97 65 - 99 mg/dL     Comment:               Fasting  reference interval         UREA NITROGEN (BUN) 81 (H) 7 - 25 mg/dL    CREATININE 5.17 (H) 0.70 - 1.35 mg/dL    EGFR 12 (L) > OR = 60 mL/min/1.73m2    BUN/CREATININE RATIO 16 6 - 22 (calc)    SODIUM 142 135 - 146 mmol/L    POTASSIUM 4.7 3.5 - 5.3 mmol/L    CHLORIDE 116 (H) 98 - 110 mmol/L    CARBON DIOXIDE 17 (L) 20 - 32 mmol/L    CALCIUM 8.8 8.6 - 10.3 mg/dL     *Note: Due to a large number of results and/or encounters for the requested time period, some results have not been displayed. A complete set of results can be found in Results Review.       Recent Results (from the past 8760 hours)   CBC With Differential With Platelet    Collection Time: 11/18/24  1:22 PM   Result Value Ref Range    WHITE BLOOD CELL COUNT 5.8 3.8 - 10.8 Thousand/uL    RED BLOOD CELL COUNT 2.55 (L) 4.20 - 5.80 Million/uL    HEMOGLOBIN 9.3 (L) 13.2 - 17.1 g/dL    HEMATOCRIT 28.5 (L) 38.5 - 50.0 %    .8 (H) 80.0 - 100.0 fL    MCH 36.5 (H) 27.0 - 33.0 pg    MCHC 32.6 32.0 - 36.0 g/dL     Comment: For adults, a slight decrease in the calculated MCHC  value (in the range of 30 to 32 g/dL) is most likely  not clinically significant; however, it should be  interpreted with caution in correlation with other  red cell parameters and the patient's clinical  condition.      RDW 14.2 11.0 - 15.0 %    PLATELET COUNT 179 140 - 400 Thousand/uL    MPV 10.9 7.5 - 12.5 fL    ABSOLUTE NEUTROPHILS 3,869 1,500 - 7,800 cells/uL    ABSOLUTE LYMPHOCYTES 1,276 850 - 3,900 cells/uL    ABSOLUTE MONOCYTES 406 200 - 950 cells/uL    ABSOLUTE EOSINOPHILS 238 15 - 500 cells/uL    ABSOLUTE BASOPHILS 12 0 - 200 cells/uL    NEUTROPHILS 66.7 %    LYMPHOCYTES 22.0 %    MONOCYTES 7.0 %    EOSINOPHILS 4.1 %    BASOPHILS 0.2 %     *Note: Due to a large number of results and/or encounters for the requested time period, some results have not been displayed. A complete set of results can be found in Results Review.       Colonoscopy 12/10/2024  Final Diagnosis:       Descending colon polyp:   Tubular adenoma.  Inked cauterized margin is negative for dysplasia.         ASSESSMENT AND PLAN:     Mechanical Fall  Rib fractures  - Accidental fall around the time of our last visit. Respiratory status stalbe  - 50% recovered, will continue with tylenol as needed for mild pain.  - Norco, use judiciously. Aware to minimize use of Norco.  Only for severe episodes of pain  - For the rib fractures, we will continue with conservative measures.  Seems to have recovered at this point.  Will repeat x-rays.  Continue with breathing exercises as instructed    Aortic insufficiency   -Previously followed with cardiology, Dr. Jaimes  - Plan to repeat echocardiogram  - Establishing with new cardiologist    Emphysema  - Seems to be stable, continue with albuterol every 4-6 hours as needed  - Feels like it is progressing. Was on trelegy. Will resend  - There is an interaction with antiviral medication and Trelegy with any inhaled corticosteroid.      Hypertension  -Blood pressure today at goal  - Check blood pressures at home  - Continue with home, clonidine 0.1 mg nightly, losartan 25 mg daily, amlodipine 5 mg daily    Hyperlipidemia  -Last lipid panel total cholesterol 200.5,  per Care Everywhere  - Repeat fasting lipid panel  - Continue with atorvastatin 10 mg nightly    CKD stage IV  -Last BUN 81 creatinine 4.58, EGFR 13.6.  Slightly worse from a year ago.  - Follows with nephrology, Dr. Hcek.  May need dialysis in the future  - Continued with Farxiga 10 mg daily    HIV disease  Initially diagnosed 1986  -On  lamivudine, Zodivudine Prezista  - Follows with infectious disease, Dr. Nichols    Anemia of chronic disease  - Hemoglobin seems to be stable 9.9 per Care Everywhere.  Iron saturation 16%, TIBC 292 per Care Everywhere  - Status post colonoscopy 12/10/2024 with Dr. Deluna which showed tubular adenoma.  Repeat again in 5 years  -No indication for VEDA per Dr. Heck  - Plan to  repeat CBC, iron studies around the time the next visit    Elevated fasting glucose  -Most recent visit glucose of 86  - Will continue slight reduction in carbohydrates    History of gout  - Will check uric acid level  - Continue allopurinol 100 mg daily,     Osteoarthritis  Low back pain  Localized to multiple joints, low back.  Suspect this is due to acute musculoskeletal back pain such as muscle strain/lumbar radiculopathy  - Comanagement with rib fractures as above.  -Would recommend initial trial of conservative therapy:    -Acetaminophen 500-650 mg every 4-6 hours as needed for pain relief  - Avoid NSAIDs to avoid any side effects-worsening kidney function  -For more severe pain, continue with Norco 5 every 6 hours as needed, try to minimize use to avoid any negative side effects.    Anxiety  - Seems to be controlled on sertraline 100 mg, 2 tablets daily to total 200 mg  - Trazodone 50 mg twice a day    Insomnia  - Discussed sleep hygiene,    Getting yourself into a rested state of mind one-on-one for bedtime  Avoid any food or liquid intake 1 hour before going to bed  Turn off all screens including your cell phone and the TV 30 minutes before bed  Try meditation or mindfulness when read at bedtime    - An alternative would be melatonin 5 mg nightly as needed, use this in 15 minutes of trying to fall asleep due to the short onset of action  - On trazodone 50 mg twice a day  - Will continue with temazepam 30 mg nightly as needed.  Try to minimize use overall insomnia.  Try to minimize use and overlap between Norco and temazepam.  -Seems to be controlled at this time.         Orders This Visit:  No orders of the defined types were placed in this encounter.      Meds This Visit:  Requested Prescriptions      No prescriptions requested or ordered in this encounter       Imaging & Referrals:  None     Health Maintenance  due for tetanus shot, shingles vaccine series     Spent 30 minutes obtaining history,  evaluating patient, discussing treatment options, diet, exercise, review of available labs and radiology reports, and completing documentation.       Return to clinic in 3-4 months for follow-up    Sterling Rooney MD, 03/31/25, 8:00 AM             [1] No Known Allergies

## 2025-03-31 ENCOUNTER — OFFICE VISIT (OUTPATIENT)
Dept: INTERNAL MEDICINE CLINIC | Facility: CLINIC | Age: 65
End: 2025-03-31

## 2025-03-31 VITALS
TEMPERATURE: 98 F | DIASTOLIC BLOOD PRESSURE: 64 MMHG | SYSTOLIC BLOOD PRESSURE: 146 MMHG | HEART RATE: 64 BPM | BODY MASS INDEX: 20.82 KG/M2 | OXYGEN SATURATION: 100 % | WEIGHT: 137.38 LBS | HEIGHT: 68 IN

## 2025-03-31 DIAGNOSIS — E78.00 HYPERCHOLESTEROLEMIA: ICD-10-CM

## 2025-03-31 DIAGNOSIS — J43.9 PULMONARY EMPHYSEMA, UNSPECIFIED EMPHYSEMA TYPE (HCC): ICD-10-CM

## 2025-03-31 DIAGNOSIS — R73.01 ABNORMAL FASTING GLUCOSE: ICD-10-CM

## 2025-03-31 DIAGNOSIS — E55.9 VITAMIN D DEFICIENCY: ICD-10-CM

## 2025-03-31 DIAGNOSIS — E53.8 FOLIC ACID DEFICIENCY: ICD-10-CM

## 2025-03-31 DIAGNOSIS — D63.1 ANEMIA DUE TO STAGE 4 CHRONIC KIDNEY DISEASE (HCC): ICD-10-CM

## 2025-03-31 DIAGNOSIS — E78.2 MIXED HYPERLIPIDEMIA: ICD-10-CM

## 2025-03-31 DIAGNOSIS — N18.4 ANEMIA DUE TO STAGE 4 CHRONIC KIDNEY DISEASE (HCC): ICD-10-CM

## 2025-03-31 DIAGNOSIS — N18.4 STAGE 4 CHRONIC KIDNEY DISEASE (HCC): Primary | ICD-10-CM

## 2025-03-31 DIAGNOSIS — G47.00 INSOMNIA, UNSPECIFIED TYPE: ICD-10-CM

## 2025-03-31 DIAGNOSIS — F41.9 ANXIETY: ICD-10-CM

## 2025-03-31 DIAGNOSIS — I10 ESSENTIAL HYPERTENSION: ICD-10-CM

## 2025-03-31 DIAGNOSIS — Z87.39 HISTORY OF GOUT: ICD-10-CM

## 2025-03-31 PROCEDURE — 99214 OFFICE O/P EST MOD 30 MIN: CPT | Performed by: INTERNAL MEDICINE

## 2025-03-31 RX ORDER — SODIUM BICARBONATE 650 MG/1
650 TABLET ORAL 3 TIMES DAILY
COMMUNITY
Start: 2025-03-25 | End: 2025-07-23

## 2025-06-04 ENCOUNTER — TELEPHONE (OUTPATIENT)
Dept: INTERNAL MEDICINE CLINIC | Facility: CLINIC | Age: 65
End: 2025-06-04

## 2025-06-04 DIAGNOSIS — G47.00 INSOMNIA, UNSPECIFIED TYPE: ICD-10-CM

## 2025-06-04 DIAGNOSIS — F41.9 ANXIETY: ICD-10-CM

## 2025-06-05 RX ORDER — TEMAZEPAM 30 MG/1
30 CAPSULE ORAL DAILY
Qty: 30 CAPSULE | Refills: 5 | Status: SHIPPED | OUTPATIENT
Start: 2025-06-05

## 2025-06-05 NOTE — TELEPHONE ENCOUNTER
To MD:  The above refill request is for a controlled substance.  Please review pended medication order.   Print and sign for staff to fax to pharmacy or prescribe electronically.    Last office visit: 3/31/25  Last time refill sent and quantity/refills: 12/10/24 #30 [place] 5 refills  [place] 5/8 #30

## 2025-08-13 ENCOUNTER — OFFICE VISIT (OUTPATIENT)
Dept: INTERNAL MEDICINE CLINIC | Facility: CLINIC | Age: 65
End: 2025-08-13

## 2025-08-13 VITALS
WEIGHT: 127.19 LBS | HEIGHT: 68 IN | RESPIRATION RATE: 16 BRPM | BODY MASS INDEX: 19.28 KG/M2 | TEMPERATURE: 98 F | OXYGEN SATURATION: 98 % | DIASTOLIC BLOOD PRESSURE: 52 MMHG | HEART RATE: 73 BPM | SYSTOLIC BLOOD PRESSURE: 136 MMHG

## 2025-08-13 DIAGNOSIS — N18.4 ANEMIA DUE TO STAGE 4 CHRONIC KIDNEY DISEASE (HCC): ICD-10-CM

## 2025-08-13 DIAGNOSIS — E53.8 FOLIC ACID DEFICIENCY: ICD-10-CM

## 2025-08-13 DIAGNOSIS — Z21 ASYMPTOMATIC HIV INFECTION, WITH NO HISTORY OF HIV-RELATED ILLNESS (HCC): ICD-10-CM

## 2025-08-13 DIAGNOSIS — G47.33 OSA (OBSTRUCTIVE SLEEP APNEA): Chronic | ICD-10-CM

## 2025-08-13 DIAGNOSIS — D63.1 ANEMIA DUE TO STAGE 4 CHRONIC KIDNEY DISEASE (HCC): ICD-10-CM

## 2025-08-13 DIAGNOSIS — M1A.0710 IDIOPATHIC CHRONIC GOUT OF RIGHT FOOT WITHOUT TOPHUS: ICD-10-CM

## 2025-08-13 DIAGNOSIS — E78.00 HYPERCHOLESTEROLEMIA: ICD-10-CM

## 2025-08-13 DIAGNOSIS — F33.42 RECURRENT MAJOR DEPRESSIVE DISORDER, IN FULL REMISSION: ICD-10-CM

## 2025-08-13 DIAGNOSIS — M15.0 PRIMARY OSTEOARTHRITIS INVOLVING MULTIPLE JOINTS: ICD-10-CM

## 2025-08-13 DIAGNOSIS — J43.9 PULMONARY EMPHYSEMA, UNSPECIFIED EMPHYSEMA TYPE (HCC): ICD-10-CM

## 2025-08-13 DIAGNOSIS — Z96.641 S/P TOTAL RIGHT HIP ARTHROPLASTY: ICD-10-CM

## 2025-08-13 DIAGNOSIS — I10 PRIMARY HYPERTENSION: Primary | ICD-10-CM

## 2025-08-13 DIAGNOSIS — I35.1 AORTIC VALVE INSUFFICIENCY, ETIOLOGY OF CARDIAC VALVE DISEASE UNSPECIFIED: ICD-10-CM

## 2025-08-13 DIAGNOSIS — Z96.642 S/P TOTAL LEFT HIP ARTHROPLASTY: ICD-10-CM

## 2025-08-13 DIAGNOSIS — N18.4 STAGE 4 CHRONIC KIDNEY DISEASE (HCC): ICD-10-CM

## 2025-08-13 PROBLEM — R94.31 ABNORMAL EKG: Status: RESOLVED | Noted: 2018-08-03 | Resolved: 2025-08-13

## 2025-08-13 PROBLEM — M17.11 PRIMARY OSTEOARTHRITIS OF RIGHT KNEE: Status: RESOLVED | Noted: 2019-06-21 | Resolved: 2025-08-13

## 2025-08-13 PROBLEM — R05.8 COUGH DUE TO ACE INHIBITOR: Status: RESOLVED | Noted: 2019-04-04 | Resolved: 2025-08-13

## 2025-08-13 PROBLEM — M87.9 OSTEONECROSIS OF RIGHT HIP (HCC): Status: RESOLVED | Noted: 2021-10-22 | Resolved: 2025-08-13

## 2025-08-13 PROBLEM — N18.30 ANEMIA IN STAGE 3 CHRONIC KIDNEY DISEASE (HCC): Status: RESOLVED | Noted: 2018-08-03 | Resolved: 2025-08-13

## 2025-08-13 PROBLEM — R53.83 FATIGUE: Status: RESOLVED | Noted: 2020-02-24 | Resolved: 2025-08-13

## 2025-08-13 PROBLEM — G89.29 CHRONIC PAIN OF LEFT KNEE: Status: RESOLVED | Noted: 2018-06-06 | Resolved: 2025-08-13

## 2025-08-13 PROBLEM — M87.9 OSTEONECROSIS OF LEFT HIP (HCC): Status: RESOLVED | Noted: 2021-06-18 | Resolved: 2025-08-13

## 2025-08-13 PROBLEM — M25.562 CHRONIC PAIN OF LEFT KNEE: Status: RESOLVED | Noted: 2018-06-06 | Resolved: 2025-08-13

## 2025-08-13 PROBLEM — T46.4X5A COUGH DUE TO ACE INHIBITOR: Status: RESOLVED | Noted: 2019-04-04 | Resolved: 2025-08-13

## 2025-08-13 PROBLEM — E78.1 HYPERTRIGLYCERIDEMIA: Status: RESOLVED | Noted: 2018-08-03 | Resolved: 2025-08-13

## 2025-08-13 PROBLEM — J34.2 NASAL SEPTAL DEVIATION: Status: RESOLVED | Noted: 2021-03-10 | Resolved: 2025-08-13

## 2025-08-13 PROCEDURE — 99215 OFFICE O/P EST HI 40 MIN: CPT | Performed by: INTERNAL MEDICINE

## 2025-08-13 RX ORDER — TIOTROPIUM BROMIDE 18 UG/1
18 CAPSULE ORAL; RESPIRATORY (INHALATION) DAILY
Qty: 90 CAPSULE | Refills: 3 | Status: SHIPPED | OUTPATIENT
Start: 2025-08-13 | End: 2026-08-08

## 2025-08-13 RX ORDER — HYDROCODONE BITARTRATE AND ACETAMINOPHEN 5; 325 MG/1; MG/1
1 TABLET ORAL EVERY 6 HOURS PRN
Qty: 20 TABLET | Refills: 0 | Status: SHIPPED | OUTPATIENT
Start: 2025-08-13

## 2025-08-15 ENCOUNTER — TELEPHONE (OUTPATIENT)
Dept: INTERNAL MEDICINE CLINIC | Facility: CLINIC | Age: 65
End: 2025-08-15

## (undated) DEVICE — C-ARM: Brand: UNBRANDED

## (undated) DEVICE — DUAL MOBILITY UPCHARGE: Type: IMPLANTABLE DEVICE

## (undated) DEVICE — MEDI-VAC NON-CONDUCTIVE SUCTION TUBING 6MM X 1.8M (6FT.) L: Brand: CARDINAL HEALTH

## (undated) DEVICE — EYE PADSSTERILENOT MADE WITH NATURAL RUBBER LATEXSINGLE USE ONLYDO NOT USE IF PACKAGE OPENED OR DAMAGED: Brand: CARDINAL HEALTH

## (undated) DEVICE — SUTURE VICRYL 2-0 FS-1

## (undated) DEVICE — SOL  .9 1000ML BTL

## (undated) DEVICE — V2 SPECIMEN COLLECTION MANIFOLD KIT: Brand: NEPTUNE

## (undated) DEVICE — GAMMEX® NON-LATEX PI ORTHO SIZE 7.5, STERILE POLYISOPRENE POWDER-FREE SURGICAL GLOVE: Brand: GAMMEX

## (undated) DEVICE — OCCLUSIVE GAUZE STRIP,3% BISMUTH TRIBROMOPHENATE IN PETROLATUM BLEND: Brand: XEROFORM

## (undated) DEVICE — TIP BOVIE 4\" MEGADYNE

## (undated) DEVICE — SPINOCAN® 18 GA. X 3-1/2 IN. (90 MM) SPINAL NEEDLE: Brand: SPINOCAN®

## (undated) DEVICE — SYRINGE MNJCT 35ML LF STRL LL

## (undated) DEVICE — SUTURE MONOCRYL 3-0 Y936H

## (undated) DEVICE — GOWN SURG AERO BLUE PERF XLG

## (undated) DEVICE — Device: Brand: STABLECUT®

## (undated) DEVICE — CLIPPER BLADE 3M

## (undated) DEVICE — CHLORAPREP 26ML APPLICATOR

## (undated) DEVICE — SOL  .9 3000ML

## (undated) DEVICE — DRESSING 10X4IN ANMC SAFETAC

## (undated) DEVICE — SUTURE ETHILON 3-0 669H

## (undated) DEVICE — SOL  .9 1000ML BAG

## (undated) DEVICE — SUTURE VICRYL 1-0 J977H

## (undated) DEVICE — 60 ML SYRINGE REGULAR TIP: Brand: MONOJECT

## (undated) DEVICE — REFLEX ULTRA 45 WITH INTEGRATED CABLE: Brand: COBLATION

## (undated) DEVICE — KIT ENDO ORCAPOD 160/180/190

## (undated) DEVICE — V2 SPECIMEN COLLECTION TRAY: Brand: NEPTUNE

## (undated) DEVICE — BIPOLAR SEALER 23-112-1 AQM 6.0: Brand: AQUAMANTYS™

## (undated) DEVICE — 60 ML SYRINGE LUER-LOCK TIP: Brand: MONOJECT

## (undated) DEVICE — STERILE LATEX POWDER-FREE SURGICAL GLOVESWITH NITRILE COATING: Brand: PROTEXIS

## (undated) DEVICE — SUCTION CANISTER, 3000CC,SAFELINER: Brand: DEROYAL

## (undated) DEVICE — GAMMEX® NON-LATEX PI ORTHO SIZE 8, STERILE POLYISOPRENE POWDER-FREE SURGICAL GLOVE: Brand: GAMMEX

## (undated) DEVICE — SHEETING SIL 2X2IN THK.04IN

## (undated) DEVICE — FLOSEAL HEMOSTATIC MATRIX, 5ML: Brand: FLOSEAL HEMOSTATIC MATRIX

## (undated) DEVICE — REM POLYHESIVE ADULT PATIENT RETURN ELECTRODE: Brand: VALLEYLAB

## (undated) DEVICE — GAUZE SPONGES,12 PLY: Brand: CURITY

## (undated) DEVICE — HANDPIECE SET WITH HIGH FLOW TIP AND SUCTION TUBE: Brand: INTERPULSE

## (undated) DEVICE — HOOD: Brand: FLYTE

## (undated) DEVICE — SUTURE CHROMIC GUT 4-0 PS-2

## (undated) DEVICE — HEAD & NECK: Brand: MEDLINE INDUSTRIES, INC.

## (undated) DEVICE — ENCORE® LATEX ACCLAIM SIZE 7.5, STERILE LATEX POWDER-FREE SURGICAL GLOVE: Brand: ENCORE

## (undated) DEVICE — NASAL ACCESSORY: Brand: MEDLINE INDUSTRIES, INC.

## (undated) DEVICE — LASSO POLYPECTOMY SNARE: Brand: LASSO

## (undated) DEVICE — TOTAL HIP W/CER HEAD: Type: IMPLANTABLE DEVICE

## (undated) DEVICE — ANTERIOR HIP: Brand: MEDLINE INDUSTRIES, INC.

## (undated) DEVICE — 3M™ STERI-STRIP™ REINFORCED ADHESIVE SKIN CLOSURES, R1547, 1/2 IN X 4 IN (12 MM X 100 MM), 6 STRIPS/ENVELOPE: Brand: 3M™ STERI-STRIP™

## (undated) DEVICE — KIT CLEAN ENDOKIT 1.1OZ GOWNX2

## (undated) DEVICE — CO2 CANNULA,SSOFT,ADLT,7O2,4CO2,FEMALE: Brand: MEDLINE

## (undated) DEVICE — GAMMEX® PI HYBRID SIZE 7, STERILE POWDER-FREE SURGICAL GLOVE, POLYISOPRENE AND NEOPRENE BLEND: Brand: GAMMEX

## (undated) NOTE — LETTER
4/15/2025          Tru Montero        [address], [address]        [place] [address]         Dear Tru,    This letter is to inform you that our office has made several attempts to reach you by phone without success.  We were attempting to contact you by phone regarding blood work order .    Please contact our office at the number listed below as soon as you receive this letter to discuss this issue and to make the necessary changes in our system to your contact information.  Thank you for your cooperation.        Sincerely,    Sterling Rooney MD  [facility], [address] [address]  [phone]

## (undated) NOTE — LETTER
No referring provider defined for this encounter. 11/20/20        Patient: Tomas Rodriguez   YOB: 1960   Date of Visit: 11/20/2020       Dear  Dr. Chris Zavaleta MD,      Thank you for referring Tomas Rodriguez to my practice.   Please find

## (undated) NOTE — LETTER
Dear Dr. Hema Pathak    This letter is to inform you that Criss Forbes has been attending Physical Therapy with me. See below for my most recent plan of care.        Patient Name: Criss Forbes, : 12/10/1960, MRN: [id number]   Date:  3/23/2018  Re

## (undated) NOTE — LETTER
Hospital Discharge Documentation  Please phone to schedule a hospital follow up appointment.     From: [address] [facility] Office  Phone: [phone]    Patient discharged time/date: 6/20/2021  6:48 PM  Patient discharge disposition:  [address] EKG     Hypertriglyceridemia     Anemia in stage 3 chronic kidney disease (HCC)     Cough due to ACE inhibitor     Primary osteoarthritis involving multiple joints     Primary osteoarthritis of right knee     Folic acid deficiency     Fatigue     Dyspnea daily. For dvt prophylaxis for 30 days post-op   Quantity: 60 tablet  Refills: 0     Calcium Citrate-Vitamin D 315-250 MG-UNIT Tabs  Commonly known as: CITRACAL-D      Take 1 tablet by mouth 2 (two) times daily with meals.    Quantity: 60 tablet  Refills: 0 Susp  Commonly known as: FLONASE      2 sprays by Nasal route daily.    Quantity: 1 Bottle  Refills: 5     folic acid 1 MG Tabs  Commonly known as: FOLVITE      TAKE 1 TABLET(1 MG) BY MOUTH DAILY   Quantity: 90 tablet  Refills: 3     lamiVUDine 150 MG Tabs

## (undated) NOTE — IP AVS SNAPSHOT
[facility]            (For Outpatient Use Only) Initial Admit Date: 6/18/2021   Inpt/Obs Admit Date: Inpt: 6/18/21 / Obs: N/A   Discharge Date:    Link Pattee:  [de-identified]   MRN: [de-identified]   CSN: [id number]   CEID: [id number]        [facility] Number:  Insurance Type:    Subscriber Name:  Subscriber :    Subscriber ID:  Pt Rel to Subscriber:    Hospital Account Financial Class: Medicaid Advantage    2021

## (undated) NOTE — LETTER
[facility]  [address], [place], [place]    Authorization for Surgical Operation and Procedure                               I hereby authorize Jim Deluna MD, my physician and his/her assistants (if applicable), which may include medical students, residents, and/or fellows, to perform the following surgical operation/ procedure and administer such anesthesia as may be determined necessary by my physician: Operation/Procedure name (s) COLONOSCOPY on Tru Montero   2.   I recognize that during the surgical operation/procedure, unforeseen conditions may necessitate additional or different procedures than those listed above.  I, therefore, further authorize and request that the above-named surgeon, assistants, or designees perform such procedures as are, in their judgment, necessary and desirable.    3.   My surgeon/physician has discussed prior to my surgery the potential benefits, risks and side effects of this procedure; the likelihood of achieving goals; and potential problems that might occur during recuperation.  They also discussed reasonable alternatives to the procedure, including risks, benefits, and side effects related to the alternatives and risks related to not receiving this procedure.  I have had all my questions answered and I acknowledge that no guarantee has been made as to the result that may be obtained.    4.   Should the need arise during my operation/procedure, which includes change of level of care prior to discharge, I also consent to the administration of blood and/or blood products.  Further, I understand that despite careful testing and screening of blood or blood products by collecting agencies, I may still be subject to ill effects as a result of receiving a blood transfusion and/or blood products.  The following are some, but not all, of the potential risks that can occur: fever and allergic reactions, hemolytic reactions, transmission of diseases such  as Hepatitis, AIDS and Cytomegalovirus (CMV) and fluid overload.  In the event that I wish to have an autologous transfusion of my own blood, or a directed donor transfusion, I will discuss this with my physician.  Check only if Refusing Blood or Blood Products  I understand refusal of blood or blood products as deemed necessary by my physician may have serious consequences to my condition to include possible death. I hereby assume responsibility for my refusal and release the hospital, its personnel, and my physicians from any responsibility for the consequences of my refusal.    o  Refuse   5.   I authorize the use of any specimen, organs, tissues, body parts or foreign objects that may be removed from my body during the operation/procedure for diagnosis, research or teaching purposes and their subsequent disposal by hospital authorities.  I also authorize the release of specimen test results and/or written reports to my treating physician on the hospital medical staff or other referring or consulting physicians involved in my care, at the discretion of the Pathologist or my treating physician.    6.   I consent to the photographing or videotaping of the operations or procedures to be performed, including appropriate portions of my body for medical, scientific, or educational purposes, provided my identity is not revealed by the pictures or by descriptive texts accompanying them.  If the procedure has been photographed/videotaped, the surgeon will obtain the original picture, image, videotape or CD.  The hospital will not be responsible for storage, release or maintenance of the picture, image, tape or CD.    7.   I consent to the presence of a  or observers in the operating room as deemed necessary by my physician or their designees.    8.   I recognize that in the event my procedure results in extended X-Ray/fluoroscopy time, I may develop a skin reaction.    9. If I have a Do Not Attempt  Resuscitation (DNAR) order in place, that status will be suspended while in the operating room, procedural suite, and during the recovery period unless otherwise explicitly stated by me (or a person authorized to consent on my behalf). The surgeon or my attending physician will determine when the applicable recovery period ends for purposes of reinstating the DNAR order.  10. Patients having a sterilization procedure: I understand that if the procedure is successful the results will be permanent and it will therefore be impossible for me to inseminate, conceive, or bear children.  I also understand that the procedure is intended to result in sterility, although the result has not been guaranteed.   11. I acknowledge that my physician has explained sedation/analgesia administration to me including the risk and benefits I consent to the administration of sedation/analgesia as may be necessary or desirable in the judgment of my physician.    I CERTIFY THAT I HAVE READ AND FULLY UNDERSTAND THE ABOVE CONSENT TO OPERATION and/or OTHER PROCEDURE.     ____________________________________  _________________________________        ______________________________  Signature of Patient    Signature of Responsible Person                Printed Name of Responsible Person                                      ____________________________________  _____________________________                ________________________________  Signature of Witness        Date  Time         Relationship to Patient    STATEMENT OF PHYSICIAN My signature below affirms that prior to the time of the procedure; I have explained to the patient and/or his/her legal representative, the risks and benefits involved in the proposed treatment and any reasonable alternative to the proposed treatment. I have also explained the risks and benefits involved in refusal of the proposed treatment and alternatives to the proposed treatment and have answered the patient's  questions. If I have a significant financial interest in a co-management agreement or a significant financial interest in any product or implant, or other significant relationship used in this procedure/surgery, I have disclosed this and had a discussion with my patient.     _____________________________________________________              _____________________________  (Signature of Physician)                                                                                         (Date)                                   (Time)  Patient Name: Tru Montero      : 12/10/1960      Printed: 2024     Medical Record #: [id number]                                      Page 1 of 1

## (undated) NOTE — LETTER
[facility]  Administration of Anesthesia  I, Tru Montero agree to be cared for by a physician anesthesiologist alone and/or with a nurse anesthetist, who is specially trained to monitor me and give me medicine to put me to sleep or keep me comfortable during my procedure    I understand that my anesthesiologist and/or anesthetist is not an employee or agent of [facility] or [employer]. He or she works for [facility]    As the patient asking for anesthesia services, I agree to:  Allow the anesthesiologist (anesthesia doctor) to give me medicine and do additional procedures as necessary. Some examples are: Starting or using an “IV” to give me medicine, fluids or blood during my procedure, and having a breathing tube placed to help me breathe when I’m asleep (intubation). In the event that my heart stops working properly, I understand that my anesthesiologist will make every effort to sustain my life, unless otherwise directed by [facility] Do Not Resuscitate documents.  Tell my anesthesia doctor before my procedure:  If I am pregnant.  The last time that I ate or drank.  iii. All of the medicines I take (including prescriptions, herbal supplements, and pills I can buy without a prescription (including street drugs/illegal medications). Failure to inform my anesthesiologist about these medicines may increase my risk of anesthetic complications.  iv.If I am allergic to anything or have had a reaction to anesthesia before.  I understand how the anesthesia medicine will help me (benefits).  I understand that with any type of anesthesia medicine there are risks:  The most common risks are: nausea, vomiting, sore throat, muscle soreness, damage to my eyes, mouth, or teeth (from breathing tube placement).  Rare risks include: remembering what happened during my procedure, allergic reactions to medications, injury to my airway, heart, lungs, vision, nerves, or muscles  and in extremely rare instances death.  My doctor has explained to me other choices available to me for my care (alternatives).  Pregnant Patients (“epidural”):  I understand that the risks of having an epidural (medicine given into my back to help control pain during labor), include itching, low blood pressure, difficulty urinating, headache or slowing of the baby’s heart. Very rare risks include infection, bleeding, seizure, irregular heart rhythms and nerve injury.  Regional Anesthesia (“spinal”, “epidural”, & “nerve blocks”):  I understand that rare but potential complications include headache, bleeding, infection, seizure, irregular heart rhythms, and nerve injury.    _____________________________________________________________________________  Patient (or Representative) Signature/Relationship to Patient  Date   Time    _____________________________________________________________________________   Name (if used)    Language/Organization   Time    _____________________________________________________________________________  Nurse Anesthetist Signature     Date   Time  _____________________________________________________________________________  Anesthesiologist Signature     Date   Time  I have discussed the procedure and information above with the patient (or patient’s representative) and answered their questions. The patient or their representative has agreed to have anesthesia services.    _____________________________________________________________________________  Witness        Date   Time  I have verified that the signature is that of the patient or patient’s representative, and that it was signed before the procedure  Patient Name: Tru Montero     : 12/10/1960                 Printed: 2024 at 8:46 AM    Medical Record #: [id number]                                            Page 1 of 1  ----------ANESTHESIA CONSENT----------

## (undated) NOTE — LETTER
No referring provider defined for this encounter. 02/19/21        Patient: Norah Jones   YOB: 1960   Date of Visit: 2/19/2021       Dear  Dr. Natasha Daily MD,      Thank you for referring Norah Jones to my practice.   Please find